# Patient Record
Sex: FEMALE | Race: WHITE | NOT HISPANIC OR LATINO | Employment: OTHER | URBAN - NONMETROPOLITAN AREA
[De-identification: names, ages, dates, MRNs, and addresses within clinical notes are randomized per-mention and may not be internally consistent; named-entity substitution may affect disease eponyms.]

---

## 2017-12-13 ENCOUNTER — APPOINTMENT (OUTPATIENT)
Dept: CT IMAGING | Facility: HOSPITAL | Age: 77
End: 2017-12-13

## 2017-12-13 ENCOUNTER — HOSPITAL ENCOUNTER (OUTPATIENT)
Facility: HOSPITAL | Age: 77
Setting detail: OBSERVATION
LOS: 2 days | Discharge: HOME HEALTH | End: 2017-12-19
Attending: EMERGENCY MEDICINE | Admitting: FAMILY MEDICINE

## 2017-12-13 DIAGNOSIS — S32.592A PUBIC RAMUS FRACTURE, LEFT, CLOSED, INITIAL ENCOUNTER (HCC): Primary | ICD-10-CM

## 2017-12-13 DIAGNOSIS — Z74.09 IMPAIRED MOBILITY AND ADLS: ICD-10-CM

## 2017-12-13 DIAGNOSIS — Z78.9 IMPAIRED MOBILITY AND ADLS: ICD-10-CM

## 2017-12-13 DIAGNOSIS — R26.89 IMPAIRED GAIT AND MOBILITY: ICD-10-CM

## 2017-12-13 DIAGNOSIS — S22.088A OTHER CLOSED FRACTURE OF TWELFTH THORACIC VERTEBRA, INITIAL ENCOUNTER (HCC): ICD-10-CM

## 2017-12-13 DIAGNOSIS — S32.020A CLOSED COMPRESSION FRACTURE OF SECOND LUMBAR VERTEBRA, INITIAL ENCOUNTER: ICD-10-CM

## 2017-12-13 DIAGNOSIS — Z74.09 IMPAIRED PHYSICAL MOBILITY: ICD-10-CM

## 2017-12-13 PROBLEM — M84.48XA: Status: ACTIVE | Noted: 2017-12-13

## 2017-12-13 LAB
GLUCOSE BLDC GLUCOMTR-MCNC: 184 MG/DL (ref 70–130)
GLUCOSE BLDC GLUCOMTR-MCNC: 224 MG/DL (ref 70–130)

## 2017-12-13 PROCEDURE — G0378 HOSPITAL OBSERVATION PER HR: HCPCS

## 2017-12-13 PROCEDURE — 82962 GLUCOSE BLOOD TEST: CPT

## 2017-12-13 PROCEDURE — 22310 CLOSED TX VERT FX W/O MANJ: CPT | Performed by: ORTHOPAEDIC SURGERY

## 2017-12-13 PROCEDURE — 74176 CT ABD & PELVIS W/O CONTRAST: CPT

## 2017-12-13 PROCEDURE — 71250 CT THORAX DX C-: CPT

## 2017-12-13 PROCEDURE — 96374 THER/PROPH/DIAG INJ IV PUSH: CPT

## 2017-12-13 PROCEDURE — 25010000002 MORPHINE PER 10 MG: Performed by: EMERGENCY MEDICINE

## 2017-12-13 PROCEDURE — 70450 CT HEAD/BRAIN W/O DYE: CPT

## 2017-12-13 PROCEDURE — 63710000001 INSULIN ASPART PER 5 UNITS: Performed by: NURSE PRACTITIONER

## 2017-12-13 PROCEDURE — 99232 SBSQ HOSP IP/OBS MODERATE 35: CPT | Performed by: ORTHOPAEDIC SURGERY

## 2017-12-13 PROCEDURE — 99285 EMERGENCY DEPT VISIT HI MDM: CPT

## 2017-12-13 RX ORDER — MORPHINE SULFATE 8 MG/ML
2 INJECTION INTRAMUSCULAR; INTRAVENOUS; SUBCUTANEOUS ONCE
Status: COMPLETED | OUTPATIENT
Start: 2017-12-13 | End: 2017-12-13

## 2017-12-13 RX ORDER — NALOXONE HCL 0.4 MG/ML
0.4 VIAL (ML) INJECTION
Status: DISCONTINUED | OUTPATIENT
Start: 2017-12-13 | End: 2017-12-19 | Stop reason: HOSPADM

## 2017-12-13 RX ORDER — GLIPIZIDE 5 MG/1
5 TABLET ORAL
COMMUNITY

## 2017-12-13 RX ORDER — ONDANSETRON 4 MG/1
4 TABLET, FILM COATED ORAL EVERY 6 HOURS PRN
Status: DISCONTINUED | OUTPATIENT
Start: 2017-12-13 | End: 2017-12-19 | Stop reason: HOSPADM

## 2017-12-13 RX ORDER — LISINOPRIL 2.5 MG/1
2.5 TABLET ORAL DAILY
COMMUNITY

## 2017-12-13 RX ORDER — ACETAMINOPHEN 325 MG/1
650 TABLET ORAL EVERY 4 HOURS PRN
Status: DISCONTINUED | OUTPATIENT
Start: 2017-12-13 | End: 2017-12-19 | Stop reason: HOSPADM

## 2017-12-13 RX ORDER — OMEPRAZOLE 20 MG/1
20 CAPSULE, DELAYED RELEASE ORAL DAILY
COMMUNITY

## 2017-12-13 RX ORDER — ASPIRIN 81 MG/1
81 TABLET, CHEWABLE ORAL DAILY
COMMUNITY

## 2017-12-13 RX ORDER — ATENOLOL 25 MG/1
25 TABLET ORAL DAILY
Status: DISCONTINUED | OUTPATIENT
Start: 2017-12-13 | End: 2017-12-13

## 2017-12-13 RX ORDER — ONDANSETRON 4 MG/1
4 TABLET, ORALLY DISINTEGRATING ORAL EVERY 6 HOURS PRN
Status: DISCONTINUED | OUTPATIENT
Start: 2017-12-13 | End: 2017-12-19 | Stop reason: HOSPADM

## 2017-12-13 RX ORDER — ATENOLOL 25 MG/1
25 TABLET ORAL DAILY
Status: ON HOLD | COMMUNITY
End: 2017-12-13 | Stop reason: DRUGHIGH

## 2017-12-13 RX ORDER — PHENOL 1.4 %
600 AEROSOL, SPRAY (ML) MUCOUS MEMBRANE DAILY
COMMUNITY
End: 2020-09-29

## 2017-12-13 RX ORDER — ATORVASTATIN CALCIUM 20 MG/1
20 TABLET, FILM COATED ORAL DAILY
Status: DISCONTINUED | OUTPATIENT
Start: 2017-12-13 | End: 2017-12-19 | Stop reason: HOSPADM

## 2017-12-13 RX ORDER — UBIDECARENONE 75 MG
50 CAPSULE ORAL DAILY
COMMUNITY
End: 2017-12-19 | Stop reason: HOSPADM

## 2017-12-13 RX ORDER — MORPHINE SULFATE 1 MG/ML
1 INJECTION, SOLUTION EPIDURAL; INTRATHECAL; INTRAVENOUS EVERY 4 HOURS PRN
Status: DISCONTINUED | OUTPATIENT
Start: 2017-12-13 | End: 2017-12-19 | Stop reason: HOSPADM

## 2017-12-13 RX ORDER — UBIDECARENONE 75 MG
50 CAPSULE ORAL DAILY
COMMUNITY

## 2017-12-13 RX ORDER — GLIPIZIDE 5 MG/1
5 TABLET ORAL
Status: DISCONTINUED | OUTPATIENT
Start: 2017-12-13 | End: 2017-12-19 | Stop reason: HOSPADM

## 2017-12-13 RX ORDER — FERROUS SULFATE TAB EC 324 MG (65 MG FE EQUIVALENT) 324 (65 FE) MG
324 TABLET DELAYED RESPONSE ORAL
COMMUNITY

## 2017-12-13 RX ORDER — PANTOPRAZOLE SODIUM 40 MG/1
40 TABLET, DELAYED RELEASE ORAL EVERY MORNING
Status: DISCONTINUED | OUTPATIENT
Start: 2017-12-14 | End: 2017-12-19 | Stop reason: HOSPADM

## 2017-12-13 RX ORDER — ONDANSETRON 2 MG/ML
4 INJECTION INTRAMUSCULAR; INTRAVENOUS EVERY 6 HOURS PRN
Status: DISCONTINUED | OUTPATIENT
Start: 2017-12-13 | End: 2017-12-19 | Stop reason: HOSPADM

## 2017-12-13 RX ORDER — ATENOLOL 25 MG/1
12.5 TABLET ORAL DAILY
Status: DISCONTINUED | OUTPATIENT
Start: 2017-12-14 | End: 2017-12-19 | Stop reason: HOSPADM

## 2017-12-13 RX ORDER — HYDROCODONE BITARTRATE AND ACETAMINOPHEN 5; 325 MG/1; MG/1
1 TABLET ORAL EVERY 4 HOURS PRN
Status: DISCONTINUED | OUTPATIENT
Start: 2017-12-13 | End: 2017-12-19 | Stop reason: HOSPADM

## 2017-12-13 RX ORDER — LISINOPRIL 2.5 MG/1
2.5 TABLET ORAL DAILY
Status: DISCONTINUED | OUTPATIENT
Start: 2017-12-13 | End: 2017-12-19 | Stop reason: HOSPADM

## 2017-12-13 RX ORDER — HYDROCHLOROTHIAZIDE 12.5 MG/1
12.5 TABLET ORAL DAILY
COMMUNITY

## 2017-12-13 RX ORDER — ASPIRIN 81 MG/1
81 TABLET, CHEWABLE ORAL DAILY
Status: DISCONTINUED | OUTPATIENT
Start: 2017-12-13 | End: 2017-12-19 | Stop reason: HOSPADM

## 2017-12-13 RX ORDER — ATENOLOL 25 MG/1
12.5 TABLET ORAL DAILY
COMMUNITY

## 2017-12-13 RX ORDER — HYDROCHLOROTHIAZIDE 12.5 MG/1
12.5 CAPSULE, GELATIN COATED ORAL DAILY
Status: DISCONTINUED | OUTPATIENT
Start: 2017-12-13 | End: 2017-12-19 | Stop reason: HOSPADM

## 2017-12-13 RX ORDER — HYDROCHLOROTHIAZIDE 25 MG/1
12.5 TABLET ORAL DAILY
Status: DISCONTINUED | OUTPATIENT
Start: 2017-12-13 | End: 2017-12-13 | Stop reason: CLARIF

## 2017-12-13 RX ORDER — FERROUS SULFATE 325(65) MG
325 TABLET ORAL
COMMUNITY
End: 2017-12-19 | Stop reason: HOSPADM

## 2017-12-13 RX ORDER — SIMVASTATIN 40 MG
40 TABLET ORAL NIGHTLY
COMMUNITY

## 2017-12-13 RX ADMIN — INSULIN ASPART 4 UNITS: 100 INJECTION, SOLUTION INTRAVENOUS; SUBCUTANEOUS at 17:30

## 2017-12-13 RX ADMIN — MORPHINE SULFATE 2 MG: 8 INJECTION, SOLUTION INTRAMUSCULAR; INTRAVENOUS at 09:56

## 2017-12-13 RX ADMIN — ATORVASTATIN CALCIUM 20 MG: 20 TABLET, FILM COATED ORAL at 16:28

## 2017-12-13 RX ADMIN — HYDROCODONE BITARTRATE AND ACETAMINOPHEN 1 TABLET: 5; 325 TABLET ORAL at 15:02

## 2017-12-13 RX ADMIN — HYDROCODONE BITARTRATE AND ACETAMINOPHEN 1 TABLET: 5; 325 TABLET ORAL at 20:58

## 2017-12-13 RX ADMIN — GLIPIZIDE 5 MG: 5 TABLET ORAL at 17:29

## 2017-12-13 RX ADMIN — ASPIRIN 81 MG 81 MG: 81 TABLET ORAL at 16:32

## 2017-12-13 RX ADMIN — METFORMIN HYDROCHLORIDE 1000 MG: 500 TABLET ORAL at 17:29

## 2017-12-13 RX ADMIN — LISINOPRIL 2.5 MG: 2.5 TABLET ORAL at 16:30

## 2017-12-13 RX ADMIN — HYDROCHLOROTHIAZIDE 12.5 MG: 12.5 CAPSULE ORAL at 16:28

## 2017-12-13 RX ADMIN — INSULIN ASPART 2 UNITS: 100 INJECTION, SOLUTION INTRAVENOUS; SUBCUTANEOUS at 20:58

## 2017-12-13 NOTE — CONSULTS
Chief Complaint   Patient presents with   • Fall     78 y/o fell at home, complains of pain in the back and hip.  Aching pain, sudden onset. This pain is constant.  The pain does not radiate, the pain is aching, constant. the pain is 6/10.  Fell at home, she fell on steps.  Review of system- see the primary dr examination/documentation  Past Medical History:   Diagnosis Date   • Cancer    • Diabetes mellitus    • Hypertension    • Irregular heart beat           Current Facility-Administered Medications:   •  acetaminophen (TYLENOL) tablet 650 mg, 650 mg, Oral, Q4H PRN, Sierra Salazar, APRN  •  aspirin chewable tablet 81 mg, 81 mg, Oral, Daily, Sierra Salazar, APRN, 81 mg at 12/13/17 1632  •  [START ON 12/14/2017] atenolol (TENORMIN) half tablet 12.5 mg, 12.5 mg, Oral, Daily, Sierra Salazar, APRN  •  atorvastatin (LIPITOR) tablet 20 mg, 20 mg, Oral, Daily, Sierra Salazar APRN, 20 mg at 12/13/17 1628  •  glipiZIDE (GLUCOTROL) tablet 5 mg, 5 mg, Oral, BID AC, Sierra Salazar, APRN, 5 mg at 12/13/17 1729  •  hydrochlorothiazide (MICROZIDE) capsule 12.5 mg, 12.5 mg, Oral, Daily, Perico Gardiner MD, 12.5 mg at 12/13/17 1628  •  HYDROcodone-acetaminophen (NORCO) 5-325 MG per tablet 1 tablet, 1 tablet, Oral, Q4H PRN, Sierra Salazar APRN, 1 tablet at 12/13/17 1502  •  insulin aspart (novoLOG) injection 0-9 Units, 0-9 Units, Subcutaneous, 4x Daily AC & at Bedtime, Sierra Salazar, APRN, 4 Units at 12/13/17 1730  •  lisinopril (PRINIVIL,ZESTRIL) tablet 2.5 mg, 2.5 mg, Oral, Daily, Sierra Salazar APRN, 2.5 mg at 12/13/17 1630  •  metFORMIN (GLUCOPHAGE) tablet 1,000 mg, 1,000 mg, Oral, BID With Meals, Sierra Salazar APRN, 1,000 mg at 12/13/17 1729  •  Morphine injection 1 mg, 1 mg, Intravenous, Q4H PRN **AND** naloxone (NARCAN) injection 0.4 mg, 0.4 mg, Intravenous, Q5 Min PRN, DEANNA Terrell  •  ondansetron (ZOFRAN) tablet 4 mg, 4 mg, Oral, Q6H PRN **OR** ondansetron ODT (ZOFRAN-ODT) disintegrating tablet 4 mg, 4 mg, Oral, Q6H PRN  "**OR** ondansetron (ZOFRAN) injection 4 mg, 4 mg, Intravenous, Q6H PRN, DEANNA Terrell  •  [START ON 12/14/2017] pantoprazole (PROTONIX) EC tablet 40 mg, 40 mg, Oral, QAM, Sierra Salazar, APRN   Past Surgical History:   Procedure Laterality Date   • ANKLE SURGERY     • CHOLECYSTECTOMY       Social History     Social History   • Marital status:      Spouse name: N/A   • Number of children: N/A   • Years of education: N/A     Occupational History   • Not on file.     Social History Main Topics   • Smoking status: Former Smoker   • Smokeless tobacco: Not on file   • Alcohol use No   • Drug use: No   • Sexual activity: Not on file     Other Topics Concern   • Not on file     Social History Narrative   • No narrative on file   History reviewed. No pertinent family history.   History reviewed. No pertinent family history.   Allergies   Allergen Reactions   • Niacin And Related    • Other      Allergic to \"all mycins\"     Vitals:    12/13/17 1628   BP: 138/74   Pulse: 107   Resp:    Temp:    SpO2:        Alert  + pain of the back region  + pain passive motion of the hip.  Pulse present, all extremities.  No neck tenderness      Lab Results (last 24 hours)     Procedure Component Value Units Date/Time    POC Glucose Once [[id number]]  (Abnormal) Collected:  12/13/17 1642    Specimen:  Blood Updated:  12/13/17 1657     Glucose 224 (H) mg/dL       RN NotifiedMeter: [id number]: [id number] ALIZA LAWSON           Imaging Results (last 24 hours)     Procedure Component Value Units Date/Time    CT Head Without Contrast [[id number]] Collected:  12/13/17 1137     Updated:  12/13/17 1150    Narrative:       .      EXAMINATION:  Computed Tomography      REGION:  Head             INDICATION:  injury    HISTORY:  CORRELATIVE IMAGING:    none    TECHNIQUE:  iv contrast:  no    This exam was performed according to the departmental  dose-optimization program which includes automated exposure  control, adjustment of the " mA and/or kV according to patient size  and/or use of iterative reconstruction technique.              COMMENTS:                - atrophy: wnl for age       - cortex:                wnl for age      - deep white mat: wnl for age       - hemorrhage:        none       - fluid collection:  no intra/extra axial fluid collection     - mass / lesion:     no focal parenchymal lesion(s)       - gray/white jxn:  borders preserved         - brain stem:        wnl       - cerebellum:       wnl       - globes / retro:    wnl       - ventricles:         normal size / configuration       - midline shift:     no       - sinuses:             wnl       - mastoids:           wnl        - osseous:             wnl       - misc.:    .       Impression:       CONCLUSION:  1.  Negative examination for acute intracranial pathology.          Electronically signed by:  YULIYA Soriano MD  12/13/2017 11:49  AM CST Workstation: [id number]    CT Abdomen Pelvis Without Contrast [[id number]] Collected:  12/13/17 1132     Updated:  12/13/17 1201    Narrative:         EXAMINATION:  Computed Tomography           REGION:    Chest / Abdomen / Pelvis                     INDICATION:     injury    HISTORY:  ZANDRA. IMAGING:    none            TECHNIQUE:      - reconstructions:    axial, coronal, sagittal         - contrast:      oral:  no ;   intravenous:  no     - Please note:      - Lack of IV contrast limits assessment of solid organ  parenchyma, urinary system, or vascular structures.      - Lack of oral contrast limits assessment of GI tract  structures or peritoneal disease.    This exam was performed according to the departmental  dose-optimization program which includes automated exposure  control, adjustment of the mA and/or kV according to patient size  and/or use of iterative reconstruction technique.          COMMENTS:           PULMONARY PARENCHYMA:         The air spaces are grossly unremarkable.    The pulmonary interstitium are grossly  within normal limits for  age.         There are no pulmonary nodules or mass.                 MEDIASTINUM / JUAN J:         The heart is of normal size and there is no pericardial fluid.     The aorta and great vessels are of normal caliber and  configuration for age.     No mediastinal mass or significant adenopathy.             PLEURAL COMPARTMENT:       There is no pleural fluid or air.                MISC:       The inferior neck is negative.     The osseous and body wall are negative.           ABDOMEN:    Limited assessment of the solid organ parenchyma is grossly  unremarkable demonstrating no evidence of organomegaly.  Status post cholecystectomy  Limited assessment of the viscera is grossly unremarkable  demonstrating normal caliber bowel loops. No evidence of free  fluid or free intraperitoneal air.  No jaime fracture. Vertebral body compression deformities of L2,  and to a lesser extent at T12. The T12 level may be associated  with a transverse fracture, nondisplaced.    Sclerotic centimeter size bone lesion involving L4, likely  benign.    RETROPERITONEUM:  Limited assessment of the kidneys demonstrates overall normal  size.  Limited assessment of the ureters demonstrates normal caliber and  course.  The adrenal glands are of normal size and contour.  No gross evidence of significant retroperitoneal adenopathy.  The vascular structures are grossly within normal limits for age.     PELVIS:   Limited assessment of the viscera is grossly unremarkable  demonstrating normal caliber bowel loops. No evidence of free  fluid or free intraperitoneal air.  There is a minimally displaced fracture of the inferior pubic  ramus on the left.  The vascular structures are grossly within normal limits for age.     .       Impression:        CONCLUSION:  1. Limited examination due to the lack of intravenous contrast.  2. No gross evidence of vascular or solid organ injury,  examination limited in that regard lacking  intravenous contrast,  which is recommended for trauma assessments.  3. There is a minimally displaced fracture involving the left  inferior pubic ramus.  4. Possible acute transverse fracture involving T12 which is  nondisplaced.  5. Vertebral body compression deformity of L2, time course  uncertain.      Electronically signed by:  YULIYA Soriano MD  12/13/2017 12:00  PM CST Workstation: [id number]    CT Chest Without Contrast [[id number]] Collected:  12/13/17 1132     Updated:  12/13/17 1201    Narrative:         EXAMINATION:  Computed Tomography           REGION:    Chest / Abdomen / Pelvis                     INDICATION:     injury    HISTORY:  ZANDRA. IMAGING:    none            TECHNIQUE:      - reconstructions:    axial, coronal, sagittal         - contrast:      oral:  no ;   intravenous:  no     - Please note:      - Lack of IV contrast limits assessment of solid organ  parenchyma, urinary system, or vascular structures.      - Lack of oral contrast limits assessment of GI tract  structures or peritoneal disease.    This exam was performed according to the departmental  dose-optimization program which includes automated exposure  control, adjustment of the mA and/or kV according to patient size  and/or use of iterative reconstruction technique.          COMMENTS:           PULMONARY PARENCHYMA:         The air spaces are grossly unremarkable.    The pulmonary interstitium are grossly within normal limits for  age.         There are no pulmonary nodules or mass.                 MEDIASTINUM / JUAN J:         The heart is of normal size and there is no pericardial fluid.     The aorta and great vessels are of normal caliber and  configuration for age.     No mediastinal mass or significant adenopathy.             PLEURAL COMPARTMENT:       There is no pleural fluid or air.                MISC:       The inferior neck is negative.     The osseous and body wall are negative.           ABDOMEN:    Limited assessment  of the solid organ parenchyma is grossly  unremarkable demonstrating no evidence of organomegaly.  Status post cholecystectomy  Limited assessment of the viscera is grossly unremarkable  demonstrating normal caliber bowel loops. No evidence of free  fluid or free intraperitoneal air.  No jaime fracture. Vertebral body compression deformities of L2,  and to a lesser extent at T12. The T12 level may be associated  with a transverse fracture, nondisplaced.    Sclerotic centimeter size bone lesion involving L4, likely  benign.    RETROPERITONEUM:  Limited assessment of the kidneys demonstrates overall normal  size.  Limited assessment of the ureters demonstrates normal caliber and  course.  The adrenal glands are of normal size and contour.  No gross evidence of significant retroperitoneal adenopathy.  The vascular structures are grossly within normal limits for age.     PELVIS:   Limited assessment of the viscera is grossly unremarkable  demonstrating normal caliber bowel loops. No evidence of free  fluid or free intraperitoneal air.  There is a minimally displaced fracture of the inferior pubic  ramus on the left.  The vascular structures are grossly within normal limits for age.     .       Impression:        CONCLUSION:  1. Limited examination due to the lack of intravenous contrast.  2. No gross evidence of vascular or solid organ injury,  examination limited in that regard lacking intravenous contrast,  which is recommended for trauma assessments.  3. There is a minimally displaced fracture involving the left  inferior pubic ramus.  4. Possible acute transverse fracture involving T12 which is  nondisplaced.  5. Vertebral body compression deformity of L2, time course  uncertain.      Electronically signed by:  YULIYA Soriano MD  12/13/2017 12:00  PM [facility] Workstation: [id number]        CT- fracture T12 and L2, and pelvic ring fracture.    Will get a spinal brace ordered for her to ambulate.  No surgical treatment,  the skeletal injuries are best to run the natural course of healing.

## 2017-12-13 NOTE — H&P
[facility] Medicine Admission      Date of Admission: 2017      Primary Care Physician: Troy Cheng MD      Chief Complaint: Fall    HPI: 77-year-old  female past medical history of type 2 diabetes, hypertension, hyperlipidemia, osteoporosis, skin cancer who presents after suffering a fall last night around 11 PM.  The patient reports that she was walking to the restroom at approximately 11 PM last night and did not turn the lights on.  She reports that she thought she was walking in to her restroom but instead walked through the doorway which was her basement steps.  The patient fell down approximately 11-12 steps.  She was brought into the emergency department and found to have left pubic ramus fracture, L2 vertebral body compression deformities, and possible acute transverse fracture of the T12 vertebrae.  The patient's biggest complaint is lower back pain post fall.      Concurrent Medical History: Type 2 diabetes, hypertension, hyperlipidemia, osteoporosis, skin cancer    Past Surgical History: Left ankle fracture repair, cholecystectomy    Family History: Family history is significant for coronary artery disease and type 2 diabetes in the patient's mother is .  Patient's father is  and also had history of coronary artery disease.  Patient reports having 2 siblings who are  related to lung cancer and 1 brother who is  related to colon cancer    Social History:  reports that she has quit smoking. She does not have any smokeless tobacco history on file. She reports that she does not drink alcohol or use illicit drugs.    Allergies:   Allergies   Allergen Reactions   • Niacin And Related        Medications:   Prior to Admission medications    Medication Sig Start Date End Date Taking? Authorizing Provider   aspirin 81 MG chewable tablet Chew 81 mg Daily.   Yes Historical Provider, MD   atenolol (TENORMIN) 25 MG  tablet Take 25 mg by mouth Daily.   Yes Historical Provider, MD   glipiZIDE (GLUCOTROL) 5 MG tablet Take 5 mg by mouth 2 (Two) Times a Day Before Meals.   Yes Historical Provider, MD   hydrochlorothiazide (HYDRODIURIL) 12.5 MG tablet Take 12.5 mg by mouth Daily.   Yes Historical Provider, MD   lisinopril (PRINIVIL,ZESTRIL) 2.5 MG tablet Take 2.5 mg by mouth Daily.   Yes Historical Provider, MD   metFORMIN (GLUCOPHAGE) 1000 MG tablet Take 1,000 mg by mouth 2 (Two) Times a Day With Meals.   Yes Historical Provider, MD   omeprazole (priLOSEC) 20 MG capsule Take 20 mg by mouth Daily.   Yes Historical Provider, MD   simvastatin (ZOCOR) 40 MG tablet Take 40 mg by mouth Every Night.   Yes Historical Provider, MD       Review of Systems:  Review of Systems   Constitutional: Negative for chills and fever.   Respiratory: Negative for chest tightness, shortness of breath and wheezing.    Cardiovascular: Negative for chest pain, palpitations and leg swelling.   Gastrointestinal: Negative for abdominal pain, constipation, diarrhea, nausea and vomiting.   Musculoskeletal: Positive for back pain and gait problem.   Skin: Negative for pallor.   Psychiatric/Behavioral: Negative for confusion.      Otherwise complete ROS is negative except as mentioned above.    Physical Exam:   Temp:  [98 °F (36.7 °C)] 98 °F (36.7 °C)  Heart Rate:  [] 101  Resp:  [16-18] 18  BP: (164-193)/(76-88) 164/76  Physical Exam   Constitutional: She is oriented to person, place, and time. She appears well-developed and well-nourished.   HENT:   Head: Normocephalic and atraumatic.   Eyes: Conjunctivae are normal.   Neck: Neck supple.   Cardiovascular: Normal rate, normal heart sounds and intact distal pulses.    Pulmonary/Chest: Effort normal and breath sounds normal.   Abdominal: Soft. Bowel sounds are normal. She exhibits no distension. There is no tenderness.   Musculoskeletal: She exhibits no edema.   Neurological: She is alert and oriented to  person, place, and time.   Skin: Skin is warm and dry.   Psychiatric: She has a normal mood and affect. Her behavior is normal.   Vitals reviewed.        Results Reviewed:  I have personally reviewed current lab, radiology, and data and agree with results.  Lab Results (last 24 hours)     ** No results found for the last 24 hours. **        Imaging Results (last 24 hours)     Procedure Component Value Units Date/Time    CT Head Without Contrast [[id number]] Collected:  12/13/17 1137     Updated:  12/13/17 1150    Narrative:       .      EXAMINATION:  Computed Tomography      REGION:  Head             INDICATION:  injury    HISTORY:  CORRELATIVE IMAGING:    none    TECHNIQUE:  iv contrast:  no    This exam was performed according to the departmental  dose-optimization program which includes automated exposure  control, adjustment of the mA and/or kV according to patient size  and/or use of iterative reconstruction technique.              COMMENTS:                - atrophy: wnl for age       - cortex:                wnl for age      - deep white mat: wnl for age       - hemorrhage:        none       - fluid collection:  no intra/extra axial fluid collection     - mass / lesion:     no focal parenchymal lesion(s)       - gray/white jxn:  borders preserved         - brain stem:        wnl       - cerebellum:       wnl       - globes / retro:    wnl       - ventricles:         normal size / configuration       - midline shift:     no       - sinuses:             wnl       - mastoids:           wnl        - osseous:             wnl       - misc.:    .       Impression:       CONCLUSION:  1.  Negative examination for acute intracranial pathology.          Electronically signed by:  YULIYA Soriano MD  12/13/2017 11:49  AM CST Workstation: [id number]    CT Abdomen Pelvis Without Contrast [[id number]] Collected:  12/13/17 1132     Updated:  12/13/17 1201    Narrative:         EXAMINATION:  Computed Tomography           REGION:     Chest / Abdomen / Pelvis                     INDICATION:     injury    HISTORY:  ZANDRA. IMAGING:    none            TECHNIQUE:      - reconstructions:    axial, coronal, sagittal         - contrast:      oral:  no ;   intravenous:  no     - Please note:      - Lack of IV contrast limits assessment of solid organ  parenchyma, urinary system, or vascular structures.      - Lack of oral contrast limits assessment of GI tract  structures or peritoneal disease.    This exam was performed according to the departmental  dose-optimization program which includes automated exposure  control, adjustment of the mA and/or kV according to patient size  and/or use of iterative reconstruction technique.          COMMENTS:           PULMONARY PARENCHYMA:         The air spaces are grossly unremarkable.    The pulmonary interstitium are grossly within normal limits for  age.         There are no pulmonary nodules or mass.                 MEDIASTINUM / JUAN J:         The heart is of normal size and there is no pericardial fluid.     The aorta and great vessels are of normal caliber and  configuration for age.     No mediastinal mass or significant adenopathy.             PLEURAL COMPARTMENT:       There is no pleural fluid or air.                MISC:       The inferior neck is negative.     The osseous and body wall are negative.           ABDOMEN:    Limited assessment of the solid organ parenchyma is grossly  unremarkable demonstrating no evidence of organomegaly.  Status post cholecystectomy  Limited assessment of the viscera is grossly unremarkable  demonstrating normal caliber bowel loops. No evidence of free  fluid or free intraperitoneal air.  No jaime fracture. Vertebral body compression deformities of L2,  and to a lesser extent at T12. The T12 level may be associated  with a transverse fracture, nondisplaced.    Sclerotic centimeter size bone lesion involving L4, likely  benign.    RETROPERITONEUM:  Limited assessment of  the kidneys demonstrates overall normal  size.  Limited assessment of the ureters demonstrates normal caliber and  course.  The adrenal glands are of normal size and contour.  No gross evidence of significant retroperitoneal adenopathy.  The vascular structures are grossly within normal limits for age.     PELVIS:   Limited assessment of the viscera is grossly unremarkable  demonstrating normal caliber bowel loops. No evidence of free  fluid or free intraperitoneal air.  There is a minimally displaced fracture of the inferior pubic  ramus on the left.  The vascular structures are grossly within normal limits for age.     .       Impression:        CONCLUSION:  1. Limited examination due to the lack of intravenous contrast.  2. No gross evidence of vascular or solid organ injury,  examination limited in that regard lacking intravenous contrast,  which is recommended for trauma assessments.  3. There is a minimally displaced fracture involving the left  inferior pubic ramus.  4. Possible acute transverse fracture involving T12 which is  nondisplaced.  5. Vertebral body compression deformity of L2, time course  uncertain.      Electronically signed by:  YULIYA Soriano MD  12/13/2017 12:00  PM CST Workstation: [id number]    CT Chest Without Contrast [[id number]] Collected:  12/13/17 1132     Updated:  12/13/17 1201    Narrative:         EXAMINATION:  Computed Tomography           REGION:    Chest / Abdomen / Pelvis                     INDICATION:     injury    HISTORY:  ZANDRA. IMAGING:    none            TECHNIQUE:      - reconstructions:    axial, coronal, sagittal         - contrast:      oral:  no ;   intravenous:  no     - Please note:      - Lack of IV contrast limits assessment of solid organ  parenchyma, urinary system, or vascular structures.      - Lack of oral contrast limits assessment of GI tract  structures or peritoneal disease.    This exam was performed according to the departmental  dose-optimization  program which includes automated exposure  control, adjustment of the mA and/or kV according to patient size  and/or use of iterative reconstruction technique.          COMMENTS:           PULMONARY PARENCHYMA:         The air spaces are grossly unremarkable.    The pulmonary interstitium are grossly within normal limits for  age.         There are no pulmonary nodules or mass.                 MEDIASTINUM / JUAN J:         The heart is of normal size and there is no pericardial fluid.     The aorta and great vessels are of normal caliber and  configuration for age.     No mediastinal mass or significant adenopathy.             PLEURAL COMPARTMENT:       There is no pleural fluid or air.                MISC:       The inferior neck is negative.     The osseous and body wall are negative.           ABDOMEN:    Limited assessment of the solid organ parenchyma is grossly  unremarkable demonstrating no evidence of organomegaly.  Status post cholecystectomy  Limited assessment of the viscera is grossly unremarkable  demonstrating normal caliber bowel loops. No evidence of free  fluid or free intraperitoneal air.  No jaime fracture. Vertebral body compression deformities of L2,  and to a lesser extent at T12. The T12 level may be associated  with a transverse fracture, nondisplaced.    Sclerotic centimeter size bone lesion involving L4, likely  benign.    RETROPERITONEUM:  Limited assessment of the kidneys demonstrates overall normal  size.  Limited assessment of the ureters demonstrates normal caliber and  course.  The adrenal glands are of normal size and contour.  No gross evidence of significant retroperitoneal adenopathy.  The vascular structures are grossly within normal limits for age.     PELVIS:   Limited assessment of the viscera is grossly unremarkable  demonstrating normal caliber bowel loops. No evidence of free  fluid or free intraperitoneal air.  There is a minimally displaced fracture of the inferior  pubic  ramus on the left.  The vascular structures are grossly within normal limits for age.     .       Impression:        CONCLUSION:  1. Limited examination due to the lack of intravenous contrast.  2. No gross evidence of vascular or solid organ injury,  examination limited in that regard lacking intravenous contrast,  which is recommended for trauma assessments.  3. There is a minimally displaced fracture involving the left  inferior pubic ramus.  4. Possible acute transverse fracture involving T12 which is  nondisplaced.  5. Vertebral body compression deformity of L2, time course  uncertain.      Electronically signed by:  YULIYA Soriano MD  12/13/2017 12:00  PM CST Workstation: [id number]            Assessment/Plan:  1. Left pubic ramus fracture, transverse T 12 fracture, L2 compression deformity s/p fall: Ortho consulted, Pain control.    2. HTN: Atenolol, HCTZ, Lisinopril  3. HLD: Simvastatin  4. Type 2 DM: Glipizide, Metformin, FSBS AC and HS with SSI, continue home meds.     Further orders and the patient will depend upon the hospital course.  Plan of care as been discussed with the patient and her daughter visited bedside.  CODE STATUS is been confirmed with the patient and she wishes to be full code.          This document has been electronically signed by DEANNA Terrell on December 13, 2017 2:09 PM

## 2017-12-13 NOTE — ED PROVIDER NOTES
Subjective   Patient is a 77 y.o. female presenting with fall.   History provided by:  Patient   used: No    Fall   Mechanism of injury: fall    Injury location:  Torso and leg  Torso injury location:  L flank  Leg injury location:  L hip  Incident location:  Home  Time since incident:  1 hour  Arrived directly from scene: yes    Fall:     Fall occurred:  Down stairs    Height of fall:  12 stairs    Impact surface:  Carpet    Point of impact:  Unable to specify    Entrapped after fall: no    Suspicion of alcohol use: no    Suspicion of drug use: no    Prior to arrival data:     Bystander interventions:  None    Patient ambulatory at scene: no      Blood loss:  None    Responsiveness at scene:  Alert    Orientation at scene:  Person, place, situation and time    Loss of consciousness: no      Amnesic to event: no      Airway interventions:  None    Breathing interventions:  None    IV access status:  None    IO access:  None    Fluids administered:  None    Cardiac interventions:  None    Medications administered:  None    Immobilization:  None  Associated symptoms: back pain    Associated symptoms: no abdominal pain, no blindness, no chest pain, no difficulty breathing, no headaches, no hearing loss, no loss of consciousness, no nausea, no neck pain, no seizures and no vomiting    Risk factors: diabetes    Risk factors: no AICD, no anticoagulation therapy, no asthma, no beta blocker therapy, no CABG, no CAD, no CHF, no COPD, no dialysis, no hemophilia, no kidney disease, no pacemaker, no past MI, not pregnant and no steroid use        Review of Systems   Constitutional: Negative.    HENT: Negative.  Negative for hearing loss.    Eyes: Negative.  Negative for blindness.   Respiratory: Negative.    Cardiovascular: Negative.  Negative for chest pain.   Gastrointestinal: Negative.  Negative for abdominal pain, nausea and vomiting.   Endocrine: Negative.    Genitourinary: Negative.    Musculoskeletal:  Positive for back pain. Negative for arthralgias, gait problem, joint swelling, myalgias, neck pain and neck stiffness.        Complaining of left hip pain. Left rib pain. And low back pain     Skin: Negative.    Allergic/Immunologic: Negative.    Neurological: Negative.  Negative for seizures, loss of consciousness and headaches.   Hematological: Negative.    Psychiatric/Behavioral: Negative.        Past Medical History:   Diagnosis Date   • Cancer    • Diabetes mellitus    • Hypertension    • Irregular heart beat        Allergies   Allergen Reactions   • Niacin And Related        Past Surgical History:   Procedure Laterality Date   • ANKLE SURGERY     • CHOLECYSTECTOMY         History reviewed. No pertinent family history.    Social History     Social History   • Marital status:      Spouse name: N/A   • Number of children: N/A   • Years of education: N/A     Social History Main Topics   • Smoking status: Former Smoker   • Smokeless tobacco: None   • Alcohol use No   • Drug use: No   • Sexual activity: Not Asked     Other Topics Concern   • None     Social History Narrative   • None           Objective   Physical Exam   Constitutional: She is oriented to person, place, and time. She appears well-developed and well-nourished. No distress.   HENT:   Head: Normocephalic and atraumatic.   Eyes: Conjunctivae and EOM are normal. Pupils are equal, round, and reactive to light. Right eye exhibits no discharge. Left eye exhibits no discharge. No scleral icterus.   Neck: Normal range of motion. Neck supple. No JVD present. No tracheal deviation present. No thyromegaly present.   Cardiovascular: Normal rate, regular rhythm, normal heart sounds and intact distal pulses.  Exam reveals no gallop and no friction rub.    No murmur heard.  Pulmonary/Chest: Effort normal and breath sounds normal. No stridor. No respiratory distress. She has no wheezes. She has no rales. She exhibits no tenderness.   Abdominal: Soft. Bowel  sounds are normal. She exhibits no distension and no mass. There is no tenderness. There is no rebound and no guarding. No hernia.   Musculoskeletal: She exhibits no edema or deformity.        Left hip: She exhibits decreased range of motion, tenderness and bony tenderness. She exhibits normal strength, no swelling, no crepitus, no deformity and no laceration.        Arms:       Legs:  Lymphadenopathy:     She has no cervical adenopathy.   Neurological: She is alert and oriented to person, place, and time. She has normal reflexes.   Skin: Skin is warm and dry. No rash noted. She is not diaphoretic. No erythema. No pallor.   Psychiatric: She has a normal mood and affect. Her behavior is normal. Judgment and thought content normal.   Nursing note and vitals reviewed.      Procedures         ED Course  ED Course      Ct Head Without Contrast    Result Date: 12/13/2017  Narrative: .    EXAMINATION:  Computed Tomography    REGION:  Head         INDICATION:  injury  HISTORY: CORRELATIVE IMAGING:    none  TECHNIQUE:  iv contrast:  no  This exam was performed according to the departmental dose-optimization program which includes automated exposure control, adjustment of the mA and/or kV according to patient size and/or use of iterative reconstruction technique.          COMMENTS:            - atrophy: wnl for age     - cortex:                wnl for age    - deep white mat: wnl for age     - hemorrhage:        none     - fluid collection:  no intra/extra axial fluid collection   - mass / lesion:     no focal parenchymal lesion(s)     - gray/white jxn:  borders preserved       - brain stem:        wnl     - cerebellum:       wnl     - globes / retro:    wnl     - ventricles:         normal size / configuration     - midline shift:     no     - sinuses:             wnl     - mastoids:           wnl      - osseous:             wnl     - misc.: .       Impression: CONCLUSION: 1.  Negative examination for acute intracranial  pathology.    Electronically signed by:  YULIYA Soriano MD  12/13/2017 11:49 AM CST Workstation: [id number]    Ct Chest Without Contrast, Ct Abdomen Pelvis Without Contrast    Result Date: 12/13/2017  Narrative: EXAMINATION:  Computed Tomography         REGION:    Chest / Abdomen / Pelvis                 INDICATION:     injury  HISTORY: ZANDRA. IMAGING:    none        TECHNIQUE:    - reconstructions:    axial, coronal, sagittal       - contrast:      oral:  no ;   intravenous:  no   - Please note:     - Lack of IV contrast limits assessment of solid organ parenchyma, urinary system, or vascular structures.     - Lack of oral contrast limits assessment of GI tract structures or peritoneal disease. This exam was performed according to the departmental dose-optimization program which includes automated exposure control, adjustment of the mA and/or kV according to patient size and/or use of iterative reconstruction technique.      COMMENTS:       PULMONARY PARENCHYMA:       The air spaces are grossly unremarkable.  The pulmonary interstitium are grossly within normal limits for age.       There are no pulmonary nodules or mass.             MEDIASTINUM / JUAN J:       The heart is of normal size and there is no pericardial fluid.   The aorta and great vessels are of normal caliber and configuration for age.   No mediastinal mass or significant adenopathy.         PLEURAL COMPARTMENT:     There is no pleural fluid or air.            MISC:     The inferior neck is negative.   The osseous and body wall are negative.       ABDOMEN:  Limited assessment of the solid organ parenchyma is grossly unremarkable demonstrating no evidence of organomegaly. Status post cholecystectomy Limited assessment of the viscera is grossly unremarkable demonstrating normal caliber bowel loops. No evidence of free fluid or free intraperitoneal air. No jaime fracture. Vertebral body compression deformities of L2, and to a lesser extent at T12. The T12  level may be associated with a transverse fracture, nondisplaced.  Sclerotic centimeter size bone lesion involving L4, likely benign. RETROPERITONEUM: Limited assessment of the kidneys demonstrates overall normal size. Limited assessment of the ureters demonstrates normal caliber and course. The adrenal glands are of normal size and contour. No gross evidence of significant retroperitoneal adenopathy. The vascular structures are grossly within normal limits for age.  PELVIS: Limited assessment of the viscera is grossly unremarkable demonstrating normal caliber bowel loops. No evidence of free fluid or free intraperitoneal air. There is a minimally displaced fracture of the inferior pubic ramus on the left. The vascular structures are grossly within normal limits for age.  .       Impression:  CONCLUSION: 1. Limited examination due to the lack of intravenous contrast. 2. No gross evidence of vascular or solid organ injury, examination limited in that regard lacking intravenous contrast, which is recommended for trauma assessments. 3. There is a minimally displaced fracture involving the left inferior pubic ramus. 4. Possible acute transverse fracture involving T12 which is nondisplaced. 5. Vertebral body compression deformity of L2, time course uncertain. Electronically signed by:  YULIYA Soriano MD  12/13/2017 12:00 PM [facility] Workstation: [id number]                MDM  Number of Diagnoses or Management Options      Final diagnoses:   Pubic ramus fracture, left, closed, initial encounter   Other closed fracture of twelfth thoracic vertebra, initial encounter   Closed compression fracture of second lumbar vertebra, initial encounter            MONTSE Wolf  12/13/17 [id number]

## 2017-12-13 NOTE — PLAN OF CARE
Problem: Patient Care Overview (Adult)  Goal: Plan of Care Review  Outcome: Ongoing (interventions implemented as appropriate)    12/13/17 4650   Coping/Psychosocial Response Interventions   Plan Of Care Reviewed With patient   Patient Care Overview   Progress no change   Outcome Evaluation   Outcome Summary/Follow up Plan pain controlled with P.O medication, spoke with Dr. Bauman about back brace       Goal: Adult Individualization and Mutuality  Outcome: Ongoing (interventions implemented as appropriate)  Goal: Discharge Needs Assessment  Outcome: Ongoing (interventions implemented as appropriate)    Problem: Orthopaedic Fracture (Adult)  Goal: Signs and Symptoms of Listed Potential Problems Will be Absent or Manageable (Orthopaedic Fracture)  Outcome: Ongoing (interventions implemented as appropriate)

## 2017-12-14 PROBLEM — I10 HTN (HYPERTENSION): Status: ACTIVE | Noted: 2017-12-14

## 2017-12-14 PROBLEM — S32.029A CLOSED FRACTURE OF SECOND LUMBAR VERTEBRA: Status: ACTIVE | Noted: 2017-12-14

## 2017-12-14 PROBLEM — S22.089A: Status: ACTIVE | Noted: 2017-12-14

## 2017-12-14 PROBLEM — E11.9 TYPE 2 DIABETES MELLITUS: Status: ACTIVE | Noted: 2017-12-14

## 2017-12-14 LAB
ANION GAP SERPL CALCULATED.3IONS-SCNC: 11 MMOL/L (ref 5–15)
BASOPHILS # BLD AUTO: 0.01 10*3/MM3 (ref 0–0.2)
BASOPHILS NFR BLD AUTO: 0.1 % (ref 0–2)
BUN BLD-MCNC: 17 MG/DL (ref 7–21)
BUN/CREAT SERPL: 28.3 (ref 7–25)
CALCIUM SPEC-SCNC: 9 MG/DL (ref 8.4–10.2)
CHLORIDE SERPL-SCNC: 97 MMOL/L (ref 95–110)
CO2 SERPL-SCNC: 24 MMOL/L (ref 22–31)
CREAT BLD-MCNC: 0.6 MG/DL (ref 0.5–1)
DEPRECATED RDW RBC AUTO: 40.4 FL (ref 36.4–46.3)
EOSINOPHIL # BLD AUTO: 0.09 10*3/MM3 (ref 0–0.7)
EOSINOPHIL NFR BLD AUTO: 1.1 % (ref 0–7)
ERYTHROCYTE [DISTWIDTH] IN BLOOD BY AUTOMATED COUNT: 13.4 % (ref 11.5–14.5)
GFR SERPL CREATININE-BSD FRML MDRD: 97 ML/MIN/1.73 (ref 60–90)
GLUCOSE BLD-MCNC: 181 MG/DL (ref 60–100)
GLUCOSE BLDC GLUCOMTR-MCNC: 161 MG/DL (ref 70–130)
GLUCOSE BLDC GLUCOMTR-MCNC: 165 MG/DL (ref 70–130)
GLUCOSE BLDC GLUCOMTR-MCNC: 184 MG/DL (ref 70–130)
GLUCOSE BLDC GLUCOMTR-MCNC: 193 MG/DL (ref 70–130)
HCT VFR BLD AUTO: 33.4 % (ref 35–45)
HGB BLD-MCNC: 11 G/DL (ref 12–15.5)
IMM GRANULOCYTES # BLD: 0.04 10*3/MM3 (ref 0–0.02)
IMM GRANULOCYTES NFR BLD: 0.5 % (ref 0–0.5)
LYMPHOCYTES # BLD AUTO: 1.63 10*3/MM3 (ref 0.6–4.2)
LYMPHOCYTES NFR BLD AUTO: 19.4 % (ref 10–50)
MCH RBC QN AUTO: 27 PG (ref 26.5–34)
MCHC RBC AUTO-ENTMCNC: 32.9 G/DL (ref 31.4–36)
MCV RBC AUTO: 82.1 FL (ref 80–98)
MONOCYTES # BLD AUTO: 0.8 10*3/MM3 (ref 0–0.9)
MONOCYTES NFR BLD AUTO: 9.5 % (ref 0–12)
NEUTROPHILS # BLD AUTO: 5.85 10*3/MM3 (ref 2–8.6)
NEUTROPHILS NFR BLD AUTO: 69.4 % (ref 37–80)
PLATELET # BLD AUTO: 164 10*3/MM3 (ref 150–450)
PMV BLD AUTO: 10.6 FL (ref 8–12)
POTASSIUM BLD-SCNC: 3.5 MMOL/L (ref 3.5–5.1)
RBC # BLD AUTO: 4.07 10*6/MM3 (ref 3.77–5.16)
SODIUM BLD-SCNC: 132 MMOL/L (ref 137–145)
WBC NRBC COR # BLD: 8.42 10*3/MM3 (ref 3.2–9.8)

## 2017-12-14 PROCEDURE — G0378 HOSPITAL OBSERVATION PER HR: HCPCS

## 2017-12-14 PROCEDURE — 85025 COMPLETE CBC W/AUTO DIFF WBC: CPT | Performed by: NURSE PRACTITIONER

## 2017-12-14 PROCEDURE — 80048 BASIC METABOLIC PNL TOTAL CA: CPT | Performed by: NURSE PRACTITIONER

## 2017-12-14 PROCEDURE — 82962 GLUCOSE BLOOD TEST: CPT

## 2017-12-14 PROCEDURE — 63710000001 INSULIN ASPART PER 5 UNITS: Performed by: NURSE PRACTITIONER

## 2017-12-14 RX ADMIN — INSULIN ASPART 2 UNITS: 100 INJECTION, SOLUTION INTRAVENOUS; SUBCUTANEOUS at 23:14

## 2017-12-14 RX ADMIN — LISINOPRIL 2.5 MG: 2.5 TABLET ORAL at 08:40

## 2017-12-14 RX ADMIN — ONDANSETRON 4 MG: 4 TABLET, ORALLY DISINTEGRATING ORAL at 17:40

## 2017-12-14 RX ADMIN — GLIPIZIDE 5 MG: 5 TABLET ORAL at 07:38

## 2017-12-14 RX ADMIN — Medication 12.5 MG: at 08:39

## 2017-12-14 RX ADMIN — INSULIN ASPART 2 UNITS: 100 INJECTION, SOLUTION INTRAVENOUS; SUBCUTANEOUS at 11:10

## 2017-12-14 RX ADMIN — INSULIN ASPART 2 UNITS: 100 INJECTION, SOLUTION INTRAVENOUS; SUBCUTANEOUS at 07:38

## 2017-12-14 RX ADMIN — METFORMIN HYDROCHLORIDE 1000 MG: 500 TABLET ORAL at 07:37

## 2017-12-14 RX ADMIN — PANTOPRAZOLE SODIUM 40 MG: 40 TABLET, DELAYED RELEASE ORAL at 06:09

## 2017-12-14 RX ADMIN — HYDROCHLOROTHIAZIDE 12.5 MG: 12.5 CAPSULE ORAL at 08:39

## 2017-12-14 RX ADMIN — ASPIRIN 81 MG 81 MG: 81 TABLET ORAL at 08:39

## 2017-12-14 RX ADMIN — GLIPIZIDE 5 MG: 5 TABLET ORAL at 17:36

## 2017-12-14 RX ADMIN — METFORMIN HYDROCHLORIDE 1000 MG: 500 TABLET ORAL at 17:36

## 2017-12-14 RX ADMIN — HYDROCODONE BITARTRATE AND ACETAMINOPHEN 1 TABLET: 5; 325 TABLET ORAL at 16:44

## 2017-12-14 RX ADMIN — INSULIN ASPART 2 UNITS: 100 INJECTION, SOLUTION INTRAVENOUS; SUBCUTANEOUS at 17:36

## 2017-12-14 RX ADMIN — HYDROCODONE BITARTRATE AND ACETAMINOPHEN 1 TABLET: 5; 325 TABLET ORAL at 23:15

## 2017-12-14 RX ADMIN — ATORVASTATIN CALCIUM 20 MG: 20 TABLET, FILM COATED ORAL at 08:39

## 2017-12-14 RX ADMIN — HYDROCODONE BITARTRATE AND ACETAMINOPHEN 1 TABLET: 5; 325 TABLET ORAL at 08:45

## 2017-12-14 NOTE — PROGRESS NOTES
[facility] Medicine Services  INPATIENT PROGRESS NOTE    Length of Stay: 1  Date of Admission: 12/13/2017  Primary Care Physician: Troy Cheng MD    Subjective   Chief Complaint: Fall, back pain  HPI:  77 year old female with a history of DM II, HTN, and osteoporosis who suffered a fall and sustained fractures to her left pubic ramus, L2, and T12.  She was evaluated by spine/orthopedics who recommend brace and pain control, no surgical management.     Review of Systems   Constitutional: Negative for chills and fever.   Respiratory: Negative for shortness of breath.    Cardiovascular: Negative for chest pain and palpitations.   Gastrointestinal: Negative for abdominal pain, diarrhea, nausea and vomiting.   Musculoskeletal: Positive for back pain.      All pertinent negatives and positives are as above. All other systems have been reviewed and are negative unless otherwise stated.     Objective    Temp:  [96.6 °F (35.9 °C)-99.7 °F (37.6 °C)] 99.7 °F (37.6 °C)  Heart Rate:  [] 92  Resp:  [18] 18  BP: (103-142)/(52-77) 142/77    Physical Exam   Constitutional: She is oriented to person, place, and time. She appears well-developed and well-nourished.   HENT:   Head: Normocephalic and atraumatic.   Cardiovascular: Normal rate, regular rhythm, normal heart sounds and intact distal pulses.    Pulmonary/Chest: Effort normal and breath sounds normal. No respiratory distress.   Abdominal: Soft. Bowel sounds are normal. She exhibits no distension. There is no tenderness.   Musculoskeletal: Normal range of motion. She exhibits no edema or deformity.   Neurological: She is alert and oriented to person, place, and time. She exhibits normal muscle tone.   Skin: Skin is warm and dry. No erythema.   Vitals reviewed.    Results Review:  I have reviewed the labs, radiology results, and diagnostic studies.    Laboratory Data:     Results from last 7 days  Lab Units  12/14/17  0546   SODIUM mmol/L 132*   POTASSIUM mmol/L 3.5   CHLORIDE mmol/L 97   CO2 mmol/L 24.0   BUN mg/dL 17   CREATININE mg/dL 0.60   GLUCOSE mg/dL 181*   CALCIUM mg/dL 9.0   ANION GAP mmol/L 11.0     Estimated Creatinine Clearance: 56 mL/min (by C-G formula based on Cr of 0.6).            Results from last 7 days  Lab Units 12/14/17  0546   WBC 10*3/mm3 8.42   HEMOGLOBIN g/dL 11.0*   HEMATOCRIT % 33.4*   PLATELETS 10*3/mm3 164           Culture Data:   No results found for: BLOODCX  No results found for: URINECX  No results found for: RESPCX  No results found for: WOUNDCX  No results found for: STOOLCX  No components found for: BODYFLD    Radiology Data:   Imaging Results (last 24 hours)     ** No results found for the last 24 hours. **          I have reviewed the patient current medications.     Assessment/Plan     Hospital Problem List     * (Principal)Closed T12 spinal fracture    Closed fracture of ramus of left pubis    Closed fracture of second lumbar vertebra    HTN (hypertension)    Type 2 diabetes mellitus          Plan:    Back brace ordered per orthopedics  Pain control: PRN norco, PRN morphine  PT/OT when brace obtained  BP control with atenolol, lisinopril, and HCTZ  Glucose control with metformin/glipizide/SSI        This document has been electronically signed by DEANNA Cordova on December 14, 2017 1:51 PM

## 2017-12-14 NOTE — PLAN OF CARE
Problem: Patient Care Overview (Adult)  Goal: Plan of Care Review  Outcome: Ongoing (interventions implemented as appropriate)    12/14/17 6933   Coping/Psychosocial Response Interventions   Plan Of Care Reviewed With patient   Patient Care Overview   Progress no change   Outcome Evaluation   Outcome Summary/Follow up Plan pt vss, pain controlled with PO medications, back brace ordered and should be delivered 12/15, continuing to monitor       Goal: Adult Individualization and Mutuality  Outcome: Ongoing (interventions implemented as appropriate)  Goal: Discharge Needs Assessment  Outcome: Ongoing (interventions implemented as appropriate)    Problem: Orthopaedic Fracture (Adult)  Goal: Signs and Symptoms of Listed Potential Problems Will be Absent or Manageable (Orthopaedic Fracture)  Outcome: Ongoing (interventions implemented as appropriate)    Problem: Fall Risk (Adult)  Goal: Identify Related Risk Factors and Signs and Symptoms  Outcome: Outcome(s) achieved Date Met:  12/14/17  Goal: Absence of Falls  Outcome: Ongoing (interventions implemented as appropriate)       Detail Level: Detailed Quality 226: Preventive Care And Screening: Tobacco Use: Screening And Cessation Intervention: Patient screened for tobacco and never smoked Quality 431: Preventive Care And Screening: Unhealthy Alcohol Use - Screening: Patient screened for unhealthy alcohol use using a single question and scores less than 2 times per year

## 2017-12-14 NOTE — PLAN OF CARE
Problem: Patient Care Overview (Adult)  Goal: Plan of Care Review  Outcome: Ongoing (interventions implemented as appropriate)    12/14/17 0312   Coping/Psychosocial Response Interventions   Plan Of Care Reviewed With patient   Patient Care Overview   Progress no change   Outcome Evaluation   Outcome Summary/Follow up Plan Patient VSS, pain controlled with PO pain meds, has rested okay this shift       Goal: Adult Individualization and Mutuality  Outcome: Ongoing (interventions implemented as appropriate)  Goal: Discharge Needs Assessment  Outcome: Ongoing (interventions implemented as appropriate)    Problem: Orthopaedic Fracture (Adult)  Goal: Signs and Symptoms of Listed Potential Problems Will be Absent or Manageable (Orthopaedic Fracture)  Outcome: Ongoing (interventions implemented as appropriate)

## 2017-12-14 NOTE — PAYOR COMM NOTE
"Keagan Carr (77 y.o. Female)     Date of Birth Social Security Number Address Home Phone MRN    [id number]  [id number] [facility] [address] [phone] [id number]    [place] Marital Status          Orthodoxy        Admission Date Admission Type Admitting Provider Attending Provider Department, Room/Bed    12/13/17 Emergency Perico Gardiner MD Gibson, Bryce, MD [facility] 3 [facility], 355/1    Discharge Date Discharge Disposition Discharge Destination                      Attending Provider: Perico Gardiner MD     Allergies:  Niacin And Related, Other    Isolation:  None   Infection:  None   Code Status:  FULL    Ht:  162.6 cm (64\")   Wt:  70.9 kg (156 lb 3.2 oz)    Admission Cmt:  None   Principal Problem:  Pathological fracture of vertebra, initial encounter [M84.48XA]                 Active Insurance as of 12/13/2017     Primary Coverage     Payor Plan Insurance Group Employer/Plan Group    HUMANA MEDICARE REPLACEMENT HUMANA MEDICARE REPL [id number]     Payor Plan Address Payor Plan Phone Number Effective From Effective To    [address] [address] [phone] 1/1/2012     [place] [address]       Subscriber Name Subscriber Birth Date Member ID       KEAGAN CARR 1940 [id number]                 Emergency Contacts      (Rel.) Home Phone Work Phone Mobile Phone    Abdulaziz Carr (Spouse) [phone] -- [phone]    April Grady (Daughter) [phone] -- [phone]        Kathie Grimm RN [facility]  [phone] phone  [phone] fax    Ref#[id number]       History & Physical      DEANNA Terrell at 12/13/2017  2:09 PM     Attestation signed by Perico Gardiner MD at 12/13/2017 10:16 PM        I personally evaluated and examined the patient in conjunction with DEANNA Terrell and agree with the assessment, treatment plan, and disposition of the patient as recorded.    Exam:  Chest: CTAB          This document has been " electronically signed by Perico Gardiner MD on 2017 10:16 PM                                           [facility] Medicine Admission      Date of Admission: 2017      Primary Care Physician: Troy Cheng MD      Chief Complaint: Fall    HPI: 77-year-old  female past medical history of type 2 diabetes, hypertension, hyperlipidemia, osteoporosis, skin cancer who presents after suffering a fall last night around 11 PM.  The patient reports that she was walking to the restroom at approximately 11 PM last night and did not turn the lights on.  She reports that she thought she was walking in to her restroom but instead walked through the doorway which was her basement steps.  The patient fell down approximately 11-12 steps.  She was brought into the emergency department and found to have left pubic ramus fracture, L2 vertebral body compression deformities, and possible acute transverse fracture of the T12 vertebrae.  The patient's biggest complaint is lower back pain post fall.      Concurrent Medical History: Type 2 diabetes, hypertension, hyperlipidemia, osteoporosis, skin cancer    Past Surgical History: Left ankle fracture repair, cholecystectomy    Family History: Family history is significant for coronary artery disease and type 2 diabetes in the patient's mother is .  Patient's father is  and also had history of coronary artery disease.  Patient reports having 2 siblings who are  related to lung cancer and 1 brother who is  related to colon cancer    Social History:  reports that she has quit smoking. She does not have any smokeless tobacco history on file. She reports that she does not drink alcohol or use illicit drugs.    Allergies:   Allergies   Allergen Reactions   • Niacin And Related        Medications:   Prior to Admission medications    Medication Sig Start Date End Date Taking? Authorizing Provider    aspirin 81 MG chewable tablet Chew 81 mg Daily.   Yes Historical Provider, MD   atenolol (TENORMIN) 25 MG tablet Take 25 mg by mouth Daily.   Yes Historical Provider, MD   glipiZIDE (GLUCOTROL) 5 MG tablet Take 5 mg by mouth 2 (Two) Times a Day Before Meals.   Yes Historical Provider, MD   hydrochlorothiazide (HYDRODIURIL) 12.5 MG tablet Take 12.5 mg by mouth Daily.   Yes Historical Provider, MD   lisinopril (PRINIVIL,ZESTRIL) 2.5 MG tablet Take 2.5 mg by mouth Daily.   Yes Historical Provider, MD   metFORMIN (GLUCOPHAGE) 1000 MG tablet Take 1,000 mg by mouth 2 (Two) Times a Day With Meals.   Yes Historical Provider, MD   omeprazole (priLOSEC) 20 MG capsule Take 20 mg by mouth Daily.   Yes Historical Provider, MD   simvastatin (ZOCOR) 40 MG tablet Take 40 mg by mouth Every Night.   Yes Historical Provider, MD       Review of Systems:  Review of Systems   Constitutional: Negative for chills and fever.   Respiratory: Negative for chest tightness, shortness of breath and wheezing.    Cardiovascular: Negative for chest pain, palpitations and leg swelling.   Gastrointestinal: Negative for abdominal pain, constipation, diarrhea, nausea and vomiting.   Musculoskeletal: Positive for back pain and gait problem.   Skin: Negative for pallor.   Psychiatric/Behavioral: Negative for confusion.      Otherwise complete ROS is negative except as mentioned above.    Physical Exam:   Temp:  [98 °F (36.7 °C)] 98 °F (36.7 °C)  Heart Rate:  [] 101  Resp:  [16-18] 18  BP: (164-193)/(76-88) 164/76  Physical Exam   Constitutional: She is oriented to person, place, and time. She appears well-developed and well-nourished.   HENT:   Head: Normocephalic and atraumatic.   Eyes: Conjunctivae are normal.   Neck: Neck supple.   Cardiovascular: Normal rate, normal heart sounds and intact distal pulses.    Pulmonary/Chest: Effort normal and breath sounds normal.   Abdominal: Soft. Bowel sounds are normal. She exhibits no distension. There is  no tenderness.   Musculoskeletal: She exhibits no edema.   Neurological: She is alert and oriented to person, place, and time.   Skin: Skin is warm and dry.   Psychiatric: She has a normal mood and affect. Her behavior is normal.   Vitals reviewed.        Results Reviewed:  I have personally reviewed current lab, radiology, and data and agree with results.  Lab Results (last 24 hours)     ** No results found for the last 24 hours. **        Imaging Results (last 24 hours)     Procedure Component Value Units Date/Time    CT Head Without Contrast [[id number]] Collected:  12/13/17 1137     Updated:  12/13/17 1150    Narrative:       .      EXAMINATION:  Computed Tomography      REGION:  Head             INDICATION:  injury    HISTORY:  CORRELATIVE IMAGING:    none    TECHNIQUE:  iv contrast:  no    This exam was performed according to the departmental  dose-optimization program which includes automated exposure  control, adjustment of the mA and/or kV according to patient size  and/or use of iterative reconstruction technique.              COMMENTS:                - atrophy: wnl for age       - cortex:                wnl for age      - deep white mat: wnl for age       - hemorrhage:        none       - fluid collection:  no intra/extra axial fluid collection     - mass / lesion:     no focal parenchymal lesion(s)       - gray/white jxn:  borders preserved         - brain stem:        wnl       - cerebellum:       wnl       - globes / retro:    wnl       - ventricles:         normal size / configuration       - midline shift:     no       - sinuses:             wnl       - mastoids:           wnl        - osseous:             wnl       - misc.:    .       Impression:       CONCLUSION:  1.  Negative examination for acute intracranial pathology.          Electronically signed by:  YULIYA Soriano MD  12/13/2017 11:49  AM CST Workstation: [id number]    CT Abdomen Pelvis Without Contrast [[id number]] Collected:  12/13/17 1132      Updated:  12/13/17 1201    Narrative:         EXAMINATION:  Computed Tomography           REGION:    Chest / Abdomen / Pelvis                     INDICATION:     injury    HISTORY:  ZANDRA. IMAGING:    none            TECHNIQUE:      - reconstructions:    axial, coronal, sagittal         - contrast:      oral:  no ;   intravenous:  no     - Please note:      - Lack of IV contrast limits assessment of solid organ  parenchyma, urinary system, or vascular structures.      - Lack of oral contrast limits assessment of GI tract  structures or peritoneal disease.    This exam was performed according to the departmental  dose-optimization program which includes automated exposure  control, adjustment of the mA and/or kV according to patient size  and/or use of iterative reconstruction technique.          COMMENTS:           PULMONARY PARENCHYMA:         The air spaces are grossly unremarkable.    The pulmonary interstitium are grossly within normal limits for  age.         There are no pulmonary nodules or mass.                 MEDIASTINUM / JUAN J:         The heart is of normal size and there is no pericardial fluid.     The aorta and great vessels are of normal caliber and  configuration for age.     No mediastinal mass or significant adenopathy.             PLEURAL COMPARTMENT:       There is no pleural fluid or air.                MISC:       The inferior neck is negative.     The osseous and body wall are negative.           ABDOMEN:    Limited assessment of the solid organ parenchyma is grossly  unremarkable demonstrating no evidence of organomegaly.  Status post cholecystectomy  Limited assessment of the viscera is grossly unremarkable  demonstrating normal caliber bowel loops. No evidence of free  fluid or free intraperitoneal air.  No jaime fracture. Vertebral body compression deformities of L2,  and to a lesser extent at T12. The T12 level may be associated  with a transverse fracture, nondisplaced.    Sclerotic  centimeter size bone lesion involving L4, likely  benign.    RETROPERITONEUM:  Limited assessment of the kidneys demonstrates overall normal  size.  Limited assessment of the ureters demonstrates normal caliber and  course.  The adrenal glands are of normal size and contour.  No gross evidence of significant retroperitoneal adenopathy.  The vascular structures are grossly within normal limits for age.     PELVIS:   Limited assessment of the viscera is grossly unremarkable  demonstrating normal caliber bowel loops. No evidence of free  fluid or free intraperitoneal air.  There is a minimally displaced fracture of the inferior pubic  ramus on the left.  The vascular structures are grossly within normal limits for age.     .       Impression:        CONCLUSION:  1. Limited examination due to the lack of intravenous contrast.  2. No gross evidence of vascular or solid organ injury,  examination limited in that regard lacking intravenous contrast,  which is recommended for trauma assessments.  3. There is a minimally displaced fracture involving the left  inferior pubic ramus.  4. Possible acute transverse fracture involving T12 which is  nondisplaced.  5. Vertebral body compression deformity of L2, time course  uncertain.      Electronically signed by:  YULIYA Soriano MD  12/13/2017 12:00  PM [facility] Workstation: [id number]    CT Chest Without Contrast [[id number]] Collected:  12/13/17 1132     Updated:  12/13/17 1201    Narrative:         EXAMINATION:  Computed Tomography           REGION:    Chest / Abdomen / Pelvis                     INDICATION:     injury    HISTORY:  ZANDRA. IMAGING:    none            TECHNIQUE:      - reconstructions:    axial, coronal, sagittal         - contrast:      oral:  no ;   intravenous:  no     - Please note:      - Lack of IV contrast limits assessment of solid organ  parenchyma, urinary system, or vascular structures.      - Lack of oral contrast limits assessment of GI tract  structures  or peritoneal disease.    This exam was performed according to the departmental  dose-optimization program which includes automated exposure  control, adjustment of the mA and/or kV according to patient size  and/or use of iterative reconstruction technique.          COMMENTS:           PULMONARY PARENCHYMA:         The air spaces are grossly unremarkable.    The pulmonary interstitium are grossly within normal limits for  age.         There are no pulmonary nodules or mass.                 MEDIASTINUM / JUAN J:         The heart is of normal size and there is no pericardial fluid.     The aorta and great vessels are of normal caliber and  configuration for age.     No mediastinal mass or significant adenopathy.             PLEURAL COMPARTMENT:       There is no pleural fluid or air.                MISC:       The inferior neck is negative.     The osseous and body wall are negative.           ABDOMEN:    Limited assessment of the solid organ parenchyma is grossly  unremarkable demonstrating no evidence of organomegaly.  Status post cholecystectomy  Limited assessment of the viscera is grossly unremarkable  demonstrating normal caliber bowel loops. No evidence of free  fluid or free intraperitoneal air.  No jaime fracture. Vertebral body compression deformities of L2,  and to a lesser extent at T12. The T12 level may be associated  with a transverse fracture, nondisplaced.    Sclerotic centimeter size bone lesion involving L4, likely  benign.    RETROPERITONEUM:  Limited assessment of the kidneys demonstrates overall normal  size.  Limited assessment of the ureters demonstrates normal caliber and  course.  The adrenal glands are of normal size and contour.  No gross evidence of significant retroperitoneal adenopathy.  The vascular structures are grossly within normal limits for age.     PELVIS:   Limited assessment of the viscera is grossly unremarkable  demonstrating normal caliber bowel loops. No evidence of  free  fluid or free intraperitoneal air.  There is a minimally displaced fracture of the inferior pubic  ramus on the left.  The vascular structures are grossly within normal limits for age.     .       Impression:        CONCLUSION:  1. Limited examination due to the lack of intravenous contrast.  2. No gross evidence of vascular or solid organ injury,  examination limited in that regard lacking intravenous contrast,  which is recommended for trauma assessments.  3. There is a minimally displaced fracture involving the left  inferior pubic ramus.  4. Possible acute transverse fracture involving T12 which is  nondisplaced.  5. Vertebral body compression deformity of L2, time course  uncertain.      Electronically signed by:  YULIYA Soriano MD  12/13/2017 12:00  PM CST Workstation: [id number]            Assessment/Plan:  1. Left pubic ramus fracture, transverse T 12 fracture, L2 compression deformity s/p fall: Ortho consulted, Pain control.    2. HTN: Atenolol, HCTZ, Lisinopril  3. HLD: Simvastatin  4. Type 2 DM: Glipizide, Metformin, FSBS AC and HS with SSI, continue home meds.     Further orders and the patient will depend upon the hospital course.  Plan of care as been discussed with the patient and her daughter visited bedside.  CODE STATUS is been confirmed with the patient and she wishes to be full code.          This document has been electronically signed by DEANNA Terrell on December 13, 2017 2:09 PM                     Electronically signed by Perico Gardiner MD at 12/13/2017 10:16 PM        Hospital Medications (active)       Dose Frequency Start End    acetaminophen (TYLENOL) tablet 650 mg 650 mg Every 4 Hours PRN 12/13/2017     Sig - Route: Take 2 tablets by mouth Every 4 (Four) Hours As Needed for Mild Pain . - Oral    aspirin chewable tablet 81 mg 81 mg Daily 12/13/2017     Sig - Route: Chew 1 tablet Daily. - Oral    atenolol (TENORMIN) half tablet 12.5 mg 12.5 mg Daily 12/14/2017     Sig - Route:  "Take 1 half tablet by mouth Daily. - Oral    atorvastatin (LIPITOR) tablet 20 mg 20 mg Daily 12/13/2017     Sig - Route: Take 1 tablet by mouth Daily. - Oral    glipiZIDE (GLUCOTROL) tablet 5 mg 5 mg 2 Times Daily Before Meals 12/13/2017     Sig - Route: Take 1 tablet by mouth 2 (Two) Times a Day Before Meals. - Oral    hydrochlorothiazide (MICROZIDE) capsule 12.5 mg 12.5 mg Daily 12/13/2017     Sig - Route: Take 1 capsule by mouth Daily. - Oral    HYDROcodone-acetaminophen (NORCO) 5-325 MG per tablet 1 tablet 1 tablet Every 4 Hours PRN 12/13/2017 12/23/2017    Sig - Route: Take 1 tablet by mouth Every 4 (Four) Hours As Needed for Moderate Pain . - Oral    insulin aspart (novoLOG) injection 0-9 Units 0-9 Units 4 Times Daily Before Meals & Nightly 12/13/2017     Sig - Route: Inject 0-9 Units under the skin 4 (Four) Times a Day Before Meals & at Bedtime. - Subcutaneous    lisinopril (PRINIVIL,ZESTRIL) tablet 2.5 mg 2.5 mg Daily 12/13/2017     Sig - Route: Take 1 tablet by mouth Daily. - Oral    metFORMIN (GLUCOPHAGE) tablet 1,000 mg 1,000 mg 2 Times Daily With Meals 12/13/2017     Sig - Route: Take 2 tablets by mouth 2 (Two) Times a Day With Meals. - Oral    Morphine injection 1 mg 1 mg Every 4 Hours PRN 12/13/2017 12/23/2017    Sig - Route: Infuse 1 mL into a venous catheter Every 4 (Four) Hours As Needed for Severe Pain . - Intravenous    Linked Group 1:  \"And\" Linked Group Details        naloxone (NARCAN) injection 0.4 mg 0.4 mg Every 5 Minutes PRN 12/13/2017     Sig - Route: Infuse 1 mL into a venous catheter Every 5 (Five) Minutes As Needed for Respiratory Depression. - Intravenous    Linked Group 1:  \"And\" Linked Group Details        ondansetron (ZOFRAN) injection 4 mg 4 mg Every 6 Hours PRN 12/13/2017     Sig - Route: Infuse 2 mL into a venous catheter Every 6 (Six) Hours As Needed for Nausea or Vomiting. - Intravenous    Linked Group 2:  \"Or\" Linked Group Details        ondansetron (ZOFRAN) tablet 4 mg 4 mg " "Every 6 Hours PRN 12/13/2017     Sig - Route: Take 1 tablet by mouth Every 6 (Six) Hours As Needed for Nausea or Vomiting. - Oral    Linked Group 2:  \"Or\" Linked Group Details        ondansetron ODT (ZOFRAN-ODT) disintegrating tablet 4 mg 4 mg Every 6 Hours PRN 12/13/2017     Sig - Route: Take 1 tablet by mouth Every 6 (Six) Hours As Needed for Nausea or Vomiting. - Oral    Linked Group 2:  \"Or\" Linked Group Details        pantoprazole (PROTONIX) EC tablet 40 mg 40 mg Every Morning 12/14/2017     Sig - Route: Take 1 tablet by mouth Every Morning. - Oral    atenolol (TENORMIN) tablet 25 mg (Discontinued) 25 mg Daily 12/13/2017 12/13/2017    Sig - Route: Take 1 tablet by mouth Daily. - Oral    hydrochlorothiazide (HYDRODIURIL) tablet 12.5 mg (Discontinued) 12.5 mg Daily 12/13/2017 12/13/2017    Sig - Route: Take 0.5 tablets by mouth Daily. - Oral    Reason for Discontinue: Formulary change          Lab Results (all)     Procedure Component Value Units Date/Time    POC Glucose Once [[id number]]  (Abnormal) Collected:  12/13/17 1642    Specimen:  Blood Updated:  12/13/17 1657     Glucose 224 (H) mg/dL       RN NotifiedMeter: [id number]: [id number] ALIZA LAWSON       POC Glucose Once [[id number]]  (Abnormal) Collected:  12/13/17 2026    Specimen:  Blood Updated:  12/13/17 2055     Glucose 184 (H) mg/dL       RN NotifiedMeter: [id number]: [id number] CHIKIS BOSS       CBC & Differential [[id number]] Collected:  12/14/17 0546    Specimen:  Blood Updated:  12/14/17 0613    Narrative:       The following orders were created for panel order CBC & Differential.  Procedure                               Abnormality         Status                     ---------                               -----------         ------                     CBC Auto Differential[[id number]]        Abnormal            Final result                 Please view results for these tests on the individual orders.    CBC Auto Differential " [[id number]]  (Abnormal) Collected:  12/14/17 0546    Specimen:  Blood Updated:  12/14/17 0613     WBC 8.42 10*3/mm3      RBC 4.07 10*6/mm3      Hemoglobin 11.0 (L) g/dL      Hematocrit 33.4 (L) %      MCV 82.1 fL      MCH 27.0 pg      MCHC 32.9 g/dL      RDW 13.4 %      RDW-SD 40.4 fl      MPV 10.6 fL      Platelets 164 10*3/mm3      Neutrophil % 69.4 %      Lymphocyte % 19.4 %      Monocyte % 9.5 %      Eosinophil % 1.1 %      Basophil % 0.1 %      Immature Grans % 0.5 %      Neutrophils, Absolute 5.85 10*3/mm3      Lymphocytes, Absolute 1.63 10*3/mm3      Monocytes, Absolute 0.80 10*3/mm3      Eosinophils, Absolute 0.09 10*3/mm3      Basophils, Absolute 0.01 10*3/mm3      Immature Grans, Absolute 0.04 (H) 10*3/mm3     Basic Metabolic Panel [[id number]]  (Abnormal) Collected:  12/14/17 0546    Specimen:  Blood Updated:  12/14/17 0631     Glucose 181 (H) mg/dL      BUN 17 mg/dL      Creatinine 0.60 mg/dL      Sodium 132 (L) mmol/L      Potassium 3.5 mmol/L      Chloride 97 mmol/L      CO2 24.0 mmol/L      Calcium 9.0 mg/dL      eGFR Non African Amer 97 mL/min/1.73      BUN/Creatinine Ratio 28.3 (H)     Anion Gap 11.0 mmol/L     Narrative:       The MDRD GFR formula is only valid for adults with stable renal function between ages 18 and 70.    POC Glucose Once [[id number]]  (Abnormal) Collected:  12/14/17 0708    Specimen:  Blood Updated:  12/14/17 0730     Glucose 184 (H) mg/dL       RN NotifiedMeter: [id number]: [id number] HUSK MANDIE       POC Glucose Once [[id number]]  (Abnormal) Collected:  12/14/17 1026    Specimen:  Blood Updated:  12/14/17 1052     Glucose 193 (H) mg/dL       RN NotifiedMeter: [id number]: [id number] HUSK MANDIE

## 2017-12-15 LAB
ANION GAP SERPL CALCULATED.3IONS-SCNC: 12 MMOL/L (ref 5–15)
BASOPHILS # BLD AUTO: 0.02 10*3/MM3 (ref 0–0.2)
BASOPHILS NFR BLD AUTO: 0.2 % (ref 0–2)
BUN BLD-MCNC: 20 MG/DL (ref 7–21)
BUN/CREAT SERPL: 29 (ref 7–25)
CALCIUM SPEC-SCNC: 8.7 MG/DL (ref 8.4–10.2)
CHLORIDE SERPL-SCNC: 96 MMOL/L (ref 95–110)
CO2 SERPL-SCNC: 25 MMOL/L (ref 22–31)
CREAT BLD-MCNC: 0.69 MG/DL (ref 0.5–1)
DEPRECATED RDW RBC AUTO: 40.2 FL (ref 36.4–46.3)
EOSINOPHIL # BLD AUTO: 0.11 10*3/MM3 (ref 0–0.7)
EOSINOPHIL NFR BLD AUTO: 1.1 % (ref 0–7)
ERYTHROCYTE [DISTWIDTH] IN BLOOD BY AUTOMATED COUNT: 13.4 % (ref 11.5–14.5)
GFR SERPL CREATININE-BSD FRML MDRD: 82 ML/MIN/1.73 (ref 60–90)
GLUCOSE BLD-MCNC: 147 MG/DL (ref 60–100)
GLUCOSE BLDC GLUCOMTR-MCNC: 147 MG/DL (ref 70–130)
GLUCOSE BLDC GLUCOMTR-MCNC: 158 MG/DL (ref 70–130)
GLUCOSE BLDC GLUCOMTR-MCNC: 194 MG/DL (ref 70–130)
GLUCOSE BLDC GLUCOMTR-MCNC: 199 MG/DL (ref 70–130)
HCT VFR BLD AUTO: 34.9 % (ref 35–45)
HGB BLD-MCNC: 11.5 G/DL (ref 12–15.5)
IMM GRANULOCYTES # BLD: 0.03 10*3/MM3 (ref 0–0.02)
IMM GRANULOCYTES NFR BLD: 0.3 % (ref 0–0.5)
LYMPHOCYTES # BLD AUTO: 1.96 10*3/MM3 (ref 0.6–4.2)
LYMPHOCYTES NFR BLD AUTO: 18.8 % (ref 10–50)
MCH RBC QN AUTO: 26.9 PG (ref 26.5–34)
MCHC RBC AUTO-ENTMCNC: 33 G/DL (ref 31.4–36)
MCV RBC AUTO: 81.7 FL (ref 80–98)
MONOCYTES # BLD AUTO: 0.87 10*3/MM3 (ref 0–0.9)
MONOCYTES NFR BLD AUTO: 8.3 % (ref 0–12)
NEUTROPHILS # BLD AUTO: 7.43 10*3/MM3 (ref 2–8.6)
NEUTROPHILS NFR BLD AUTO: 71.3 % (ref 37–80)
PLATELET # BLD AUTO: 172 10*3/MM3 (ref 150–450)
PMV BLD AUTO: 10.9 FL (ref 8–12)
POTASSIUM BLD-SCNC: 3.8 MMOL/L (ref 3.5–5.1)
RBC # BLD AUTO: 4.27 10*6/MM3 (ref 3.77–5.16)
SODIUM BLD-SCNC: 133 MMOL/L (ref 137–145)
WBC NRBC COR # BLD: 10.42 10*3/MM3 (ref 3.2–9.8)

## 2017-12-15 PROCEDURE — 97530 THERAPEUTIC ACTIVITIES: CPT | Performed by: PHYSICAL THERAPIST

## 2017-12-15 PROCEDURE — G0378 HOSPITAL OBSERVATION PER HR: HCPCS

## 2017-12-15 PROCEDURE — 82962 GLUCOSE BLOOD TEST: CPT

## 2017-12-15 PROCEDURE — 80048 BASIC METABOLIC PNL TOTAL CA: CPT | Performed by: NURSE PRACTITIONER

## 2017-12-15 PROCEDURE — G8979 MOBILITY GOAL STATUS: HCPCS | Performed by: PHYSICAL THERAPIST

## 2017-12-15 PROCEDURE — 97162 PT EVAL MOD COMPLEX 30 MIN: CPT | Performed by: PHYSICAL THERAPIST

## 2017-12-15 PROCEDURE — G8978 MOBILITY CURRENT STATUS: HCPCS | Performed by: PHYSICAL THERAPIST

## 2017-12-15 PROCEDURE — 63710000001 INSULIN ASPART PER 5 UNITS: Performed by: NURSE PRACTITIONER

## 2017-12-15 PROCEDURE — 97762: CPT | Performed by: PHYSICAL THERAPIST

## 2017-12-15 PROCEDURE — 85025 COMPLETE CBC W/AUTO DIFF WBC: CPT | Performed by: NURSE PRACTITIONER

## 2017-12-15 RX ORDER — DOCUSATE SODIUM 100 MG/1
100 CAPSULE, LIQUID FILLED ORAL 2 TIMES DAILY
Status: DISCONTINUED | OUTPATIENT
Start: 2017-12-15 | End: 2017-12-19 | Stop reason: HOSPADM

## 2017-12-15 RX ADMIN — ONDANSETRON 4 MG: 4 TABLET, FILM COATED ORAL at 09:10

## 2017-12-15 RX ADMIN — PANTOPRAZOLE SODIUM 40 MG: 40 TABLET, DELAYED RELEASE ORAL at 06:26

## 2017-12-15 RX ADMIN — MAGNESIUM HYDROXIDE 10 ML: 2400 SUSPENSION ORAL at 16:07

## 2017-12-15 RX ADMIN — HYDROCHLOROTHIAZIDE 12.5 MG: 12.5 CAPSULE ORAL at 09:10

## 2017-12-15 RX ADMIN — INSULIN ASPART 2 UNITS: 100 INJECTION, SOLUTION INTRAVENOUS; SUBCUTANEOUS at 22:01

## 2017-12-15 RX ADMIN — DOCUSATE SODIUM 100 MG: 100 CAPSULE, LIQUID FILLED ORAL at 12:53

## 2017-12-15 RX ADMIN — HYDROCODONE BITARTRATE AND ACETAMINOPHEN 1 TABLET: 5; 325 TABLET ORAL at 22:01

## 2017-12-15 RX ADMIN — METFORMIN HYDROCHLORIDE 1000 MG: 500 TABLET ORAL at 17:24

## 2017-12-15 RX ADMIN — DOCUSATE SODIUM 100 MG: 100 CAPSULE, LIQUID FILLED ORAL at 17:24

## 2017-12-15 RX ADMIN — GLIPIZIDE 5 MG: 5 TABLET ORAL at 09:10

## 2017-12-15 RX ADMIN — INSULIN ASPART 2 UNITS: 100 INJECTION, SOLUTION INTRAVENOUS; SUBCUTANEOUS at 11:41

## 2017-12-15 RX ADMIN — LISINOPRIL 2.5 MG: 2.5 TABLET ORAL at 09:10

## 2017-12-15 RX ADMIN — METFORMIN HYDROCHLORIDE 1000 MG: 500 TABLET ORAL at 09:10

## 2017-12-15 RX ADMIN — Medication 12.5 MG: at 09:10

## 2017-12-15 RX ADMIN — INSULIN ASPART 2 UNITS: 100 INJECTION, SOLUTION INTRAVENOUS; SUBCUTANEOUS at 17:24

## 2017-12-15 RX ADMIN — ASPIRIN 81 MG 81 MG: 81 TABLET ORAL at 09:10

## 2017-12-15 RX ADMIN — ATORVASTATIN CALCIUM 20 MG: 20 TABLET, FILM COATED ORAL at 09:10

## 2017-12-15 RX ADMIN — GLIPIZIDE 5 MG: 5 TABLET ORAL at 17:24

## 2017-12-15 NOTE — PLAN OF CARE
Problem: Patient Care Overview (Adult)  Goal: Plan of Care Review  Outcome: Ongoing (interventions implemented as appropriate)    12/15/17 0801   Coping/Psychosocial Response Interventions   Plan Of Care Reviewed With patient   Patient Care Overview   Progress no change   Outcome Evaluation   Outcome Summary/Follow up Plan no acute changes       Goal: Adult Individualization and Mutuality  Outcome: Ongoing (interventions implemented as appropriate)  Goal: Discharge Needs Assessment  Outcome: Ongoing (interventions implemented as appropriate)    Problem: Orthopaedic Fracture (Adult)  Goal: Signs and Symptoms of Listed Potential Problems Will be Absent or Manageable (Orthopaedic Fracture)  Outcome: Ongoing (interventions implemented as appropriate)    Problem: Fall Risk (Adult)  Goal: Absence of Falls  Outcome: Ongoing (interventions implemented as appropriate)

## 2017-12-15 NOTE — PROGRESS NOTES
[facility] Medicine Services  INPATIENT PROGRESS NOTE    Length of Stay: 2  Date of Admission: 12/13/2017  Primary Care Physician: Troy Cheng MD    Subjective   Chief Complaint: Fall, back pain  HPI:  77 year old female with a history of DM II, HTN, and osteoporosis who suffered a fall and sustained fractures to her left pubic ramus, L2, and T12.  She was evaluated by spine/orthopedics who recommend brace and pain control, no surgical management.  She reports her pain is controlled.  She notes constipation.     Review of Systems   Constitutional: Negative for chills and fever.   Respiratory: Negative for shortness of breath.    Cardiovascular: Negative for chest pain and palpitations.   Gastrointestinal: Positive for constipation. Negative for abdominal pain, diarrhea, nausea and vomiting.   Musculoskeletal: Positive for back pain.      All pertinent negatives and positives are as above. All other systems have been reviewed and are negative unless otherwise stated.     Objective    Temp:  [96.6 °F (35.9 °C)-99.8 °F (37.7 °C)] 97 °F (36.1 °C)  Heart Rate:  [] 95  Resp:  [18] 18  BP: (119-159)/(57-74) 122/73    Physical Exam   Constitutional: She is oriented to person, place, and time. She appears well-developed and well-nourished.   HENT:   Head: Normocephalic and atraumatic.   Cardiovascular: Normal rate, regular rhythm, normal heart sounds and intact distal pulses.    Pulmonary/Chest: Effort normal and breath sounds normal. No respiratory distress.   Abdominal: Soft. Bowel sounds are normal. She exhibits no distension. There is no tenderness.   Musculoskeletal: Normal range of motion. She exhibits no edema or deformity.   Neurological: She is alert and oriented to person, place, and time. She exhibits normal muscle tone.   Skin: Skin is warm and dry. No erythema.   Vitals reviewed.    Results Review:  I have reviewed the labs, radiology results, and  diagnostic studies.    Laboratory Data:     Results from last 7 days  Lab Units 12/15/17  0541 12/14/17  0546   SODIUM mmol/L 133* 132*   POTASSIUM mmol/L 3.8 3.5   CHLORIDE mmol/L 96 97   CO2 mmol/L 25.0 24.0   BUN mg/dL 20 17   CREATININE mg/dL 0.69 0.60   GLUCOSE mg/dL 147* 181*   CALCIUM mg/dL 8.7 9.0   ANION GAP mmol/L 12.0 11.0     Estimated Creatinine Clearance: 56 mL/min (by C-G formula based on Cr of 0.69).            Results from last 7 days  Lab Units 12/15/17  0541 12/14/17  0546   WBC 10*3/mm3 10.42* 8.42   HEMOGLOBIN g/dL 11.5* 11.0*   HEMATOCRIT % 34.9* 33.4*   PLATELETS 10*3/mm3 172 164           Culture Data:   No results found for: BLOODCX  No results found for: URINECX  No results found for: RESPCX  No results found for: WOUNDCX  No results found for: STOOLCX  No components found for: BODYFLD    Radiology Data:   Imaging Results (last 24 hours)     ** No results found for the last 24 hours. **          I have reviewed the patient current medications.     Assessment/Plan     Hospital Problem List     * (Principal)Closed T12 spinal fracture    Closed fracture of ramus of left pubis    Closed fracture of second lumbar vertebra    HTN (hypertension)    Type 2 diabetes mellitus          Plan:    Back brace to be placed today  Pain control: PRN norco, PRN morphine  PT/OT  BP control with atenolol, lisinopril, and HCTZ  Glucose control with metformin/glipizide/SSI  Add colace and PRN Milk of mag  Discharge planning after therapy assessment         This document has been electronically signed by DEANNA Cordova on December 15, 2017 1:20 PM

## 2017-12-15 NOTE — PLAN OF CARE
Problem: Patient Care Overview (Adult)  Goal: Plan of Care Review  Outcome: Ongoing (interventions implemented as appropriate)    12/15/17 0323   Coping/Psychosocial Response Interventions   Plan Of Care Reviewed With patient;spouse   Patient Care Overview   Progress no change   Outcome Evaluation   Outcome Summary/Follow up Plan VSS, pain is controlled       Goal: Adult Individualization and Mutuality  Outcome: Ongoing (interventions implemented as appropriate)  Goal: Discharge Needs Assessment  Outcome: Ongoing (interventions implemented as appropriate)    Problem: Orthopaedic Fracture (Adult)  Goal: Signs and Symptoms of Listed Potential Problems Will be Absent or Manageable (Orthopaedic Fracture)  Outcome: Ongoing (interventions implemented as appropriate)    Problem: Fall Risk (Adult)  Goal: Absence of Falls  Outcome: Ongoing (interventions implemented as appropriate)

## 2017-12-15 NOTE — DISCHARGE PLACEMENT REQUEST
"Keagan Carr (77 y.o. Female)     Date of Birth Social Security Number Address Home Phone MRN    1940  [address] [address] [phone] [id number]    Evangel[employer] Marital Status          Jewish        Admission Date Admission Type Admitting Provider Attending Provider Department, Room/Bed    12/13/17 Emergency Perico Gardiner MD Gibson, Bryce, MD [address], 355/1    Discharge Date Discharge Disposition Discharge Destination                      Attending Provider: Perico Gardiner MD     Allergies:  Niacin And Related, Other    Isolation:  None   Infection:  None   Code Status:  FULL    Ht:  162.6 cm (64\")   Wt:  70.9 kg (156 lb 3.2 oz)    Admission Cmt:  None   Principal Problem:  Closed T12 spinal fracture [S22.089A]                 Active Insurance as of 12/13/2017     Primary Coverage     Payor Plan Insurance Group Employer/Plan Group    HUMANA MEDICARE REPLACEMENT HUMANA MEDICARE REPL [id number]     Payor Plan Address Payor Plan Phone Number Effective From Effective To    [address] [address] [phone] 1/1/2012     [place] [address]       Subscriber Name Subscriber Birth Date Member ID       KEAGAN CARR 1940 [id number]                 Emergency Contacts      (Rel.) Home Phone Work Phone Mobile Phone    Abdulaziz Carr (Spouse) [phone] -- [phone]    April Grady (Daughter) [phone] -- [phone]        Kathie Grimm RN [facility]  [phone] phone  [phone] fax    Pass Number 1:  \"Sign\" transmittal event        Order Parameters      Order ID: [id number]     Procedure/Medication: Initiate Observation Status     Patient: KEAGAN CARR [[id number]]     Encounter Date: 12/13/17 (JOHNY: 21544.99)     Encounter Type: Hospital Encounter [3]     Department: [address] [[id number]]     Order Workstation: [employer] [[id number]]     Order OTG:      Order Sent By: Kathie Grimm RN [[id number]]     "   Rules Used      Patient (1): [facility] FACILITY RULE [1]       Destinations      Descriptor: pat_patient_update     Record: PAT_PATIENT_UPDATE [[id number]]     Map Used: [facility] FACILITY MAP [1]     Destination Workstation: EPIC FS SYSTEM - PATIENT UPDATE WORKSTATION [370]     Report: DUMMY REPORT [1199]     Group?:      Count: 1             Local Cached Properties      Pt_class = 101     Encounter_dept = [id number]     Frequency = [id number]     Is_order_complete = Yes     Order_mode = 2     Order_type = 47     Procedure_category = 34       Modified Properties      Property is_order_complete set to Yes       General Orders Info      Order Status: Completed [5]

## 2017-12-15 NOTE — THERAPY EVALUATION
Acute Care - Physical Therapy Initial Evaluation  [facility]     Patient Name: Susanna Carr  : 1940  MRN: [id number]  Today's Date: 12/15/2017   Onset of Illness/Injury or Date of Surgery Date: 17  Date of Referral to PT: 12/15/17  Referring Physician: Puneet HUERTA      Admit Date: 2017     Visit Dx:    ICD-10-CM ICD-9-CM   1. Pubic ramus fracture, left, closed, initial encounter S32.592A 808.2   2. Other closed fracture of twelfth thoracic vertebra, initial encounter S22.088A 805.2   3. Closed compression fracture of second lumbar vertebra, initial encounter S32.020A 805.4   4. Impaired physical mobility Z74.09 781.99     Patient Active Problem List   Diagnosis   • Closed fracture of ramus of left pubis   • Pathological fracture of vertebra, initial encounter   • Closed T12 spinal fracture   • Closed fracture of second lumbar vertebra   • HTN (hypertension)   • Type 2 diabetes mellitus     Past Medical History:   Diagnosis Date   • Cancer    • Diabetes mellitus    • Hypertension    • Irregular heart beat      Past Surgical History:   Procedure Laterality Date   • ANKLE SURGERY     • CHOLECYSTECTOMY            PT ASSESSMENT (last 72 hours)      PT Evaluation       12/15/17 1335 17 1400    Rehab Evaluation    Document Type evaluation  -CB     Subjective Information agree to therapy  -CB     Patient Effort, Rehab Treatment good  -CB     Symptoms Noted During/After Treatment increased pain  -CB     General Information    Patient Profile Review yes  -CB     Onset of Illness/Injury or Date of Surgery Date 17  -CB     Referring Physician Puneet HUERTA  -CB     General Observations laying in bed with telemetry, IV, cath and SCD,  present  -CB     Pertinent History Of Current Problem fall down flight of stairs at night when going to bathroom  -CB     Precautions/Limitations fall precautions;spinal precautions;brace on when up  -CB     Prior Level of Function  independent:;gait;ADL's  -CB     Equipment Currently Used at Home none  -CB none  -LC    Plans/Goals Discussed With patient;spouse/S.O.  -CB     Risks Reviewed patient and family:;increased discomfort;change in vital signs  -CB     Benefits Reviewed patient and family:;improve function;decrease pain;decrease risk of DVT  -CB     Living Environment    Lives With spouse  -CB spouse  -LC    Living Arrangements house  -CB house  -LC    Home Accessibility stairs to enter home;stairs within home  -CB stairs within home  -LC    Number of Stairs to Enter Home 2  -CB     Number of Stairs Within Home 11  -CB     Stair Railings at Home inside, present on left side   outside - none  -CB     Transportation Available car;family or friend will provide  -CB     Living Environment Comment  is putting a lock on basement door to prevent her from going there again   -CB     Clinical Impression    Date of Referral to PT 12/15/17  -CB     PT Diagnosis impaired physical mobility due to compression frx at L2, inferior pubic rami frx on L and transverse frx T 12  -CB     Criteria for Skilled Therapeutic Interventions Met treatment indicated;yes  -CB     Pathology/Pathophysiology Noted (Describe Specifically for Each System) musculoskeletal  -CB     Impairments Found (describe specific impairments) aerobic capacity/endurance;gait, locomotion, and balance  -CB     Functional Limitations in Following Categories (Describe Specific Limitations) self-care;home management  -CB     Rehab Potential good, to achieve stated therapy goals  -CB     Predicted Duration of Therapy Intervention (days/wks) until goals met or discharged  -CB     Vital Signs    Pre Systolic BP Rehab 120  -CB     Pre Treatment Diastolic BP 66  -CB     Post Systolic BP Rehab 100  -CB     Post Treatment Diastolic BP 64  -CB     Pretreatment Heart Rate (beats/min) 92  -CB     Posttreatment Heart Rate (beats/min) 96  -CB     Pre SpO2 (%) 96  -CB     O2 Delivery Pre Treatment  room air  -CB     Post SpO2 (%) 98  -CB     O2 Delivery Post Treatment room air  -CB     Pre Patient Position Supine  -CB     Intra Patient Position Standing  -CB     Post Patient Position Sitting  -CB     Pain Assessment    Pain Assessment 0-10  -CB     Pain Score 2  -CB     Post Pain Score 5  -CB     Pain Location Back  -CB     Pain Orientation Lower;Left  -CB     Pain Intervention(s) Ambulation/increased activity  -CB     Vision Assessment/Intervention    Visual Impairment WFL with corrective lenses  -CB     Cognitive Assessment/Intervention    Current Cognitive/Communication Assessment functional  -CB     Orientation Status oriented x 4  -CB     Follows Commands/Answers Questions 100% of the time  -CB     Personal Safety WNL/WFL  -CB     Personal Safety Interventions fall prevention program maintained;gait belt;nonskid shoes/slippers when out of bed  -CB     ROM (Range of Motion)    General ROM no range of motion deficits identified  -CB     MMT (Manual Muscle Testing)    General MMT Assessment lower extremity strength deficits identified  -CB     General MMT Assessment Detail grossly LLE2/5 hip and knee 4/5 ankle due to pain, RLE 3/5 hip and knee 4/5 ankle  -CB     Mobility Assessment/Training    Extremity Weight-Bearing Status left lower extremity;right lower extremity  -CB     Left Lower Extremity Weight-Bearing weight-bearing as tolerated  -CB     Bed Mobility, Assessment/Treatment    Bed Mobility, Assistive Device bed rails  -CB     Bed Mobility, Roll Right, [place] moderate assist (50% patient effort)  -CB     Bed Mob, Sidelying to Sit, [place] moderate assist (50% patient effort)  -CB     Bed Mobility, Safety Issues decreased use of legs for bridging/pushing  -CB     Bed Mobility, Impairments pain  -CB     Transfer Assessment/Treatment    Transfers, Sit-Stand [place] minimum assist (75% patient effort);contact guard assist  -CB     Transfers, Stand-Sit [place] minimum assist (75%  patient effort)  -CB     Transfers, Sit-Stand-Sit, Assist Device rolling walker  -CB     Transfer, Safety Issues weight-shifting ability decreased  -CB     Transfer, Impairments strength decreased;pain  -CB     Transfer, Comment not able to put enough weight thru UE to take a bir step with RLE due to pain  -CB     Gait Assessment/Treatment    Gait, [place] Level minimum assist (75% patient effort)  -CB     Gait, Assistive Device rolling walker  -CB     Gait, Distance (Feet) 5  -CB     Gait, Safety Issues weight-shifting ability decreased  -CB     Gait, Impairments pain;strength decreased  -CB     Orthotics Prosthetics    Additional Documentation Orthosis Location (Group);Orthosis Management/Training (Group)  -CB     Orthosis Location    Orthosis Location/Type neck/back  -CB     Orthosis, Neck/Back TLSO (thoracic lumbar sacral orthosis)  -CB     Orthosis Management/Training    Orthosis Indications rest, reduce pain;mobilize, increase limited range of motion;increase function  -CB     Orthosis Skills Training doffing orthosis;donning orthosis;recognizing skin issues related to orthosis;restrictions/precautions;sleeping without orthosis  -CB     Orthosis Skills Training Comment  will bring T shirt tomorrow to wear under TLSO  -CB     Orthosis Wear Schedule wear when out of bed only  -CB     Orthosis Skin Assessment skin is intact, no issues w/ skin integrity  -CB     Sensory Assessment/Intervention    Light Touch LLE;RLE  -CB     LLE Light Touch WNL  -CB     RLE Light Touch WNL  -CB     Positioning and Restraints    Pre-Treatment Position in bed  -CB     Post Treatment Position chair  -CB     In Chair sitting;call light within reach;encouraged to call for assist;with family/caregiver  -CB       User Key  (r) = Recorded By, (t) = Taken By, (c) = Cosigned By    Initials Name Provider Type    CB Jennifer Agarwal, PT Physical Therapist    LC Ya Bragg, RN Registered Nurse          Physical Therapy  Education     Title: PT OT SLP Therapies (Active)     Topic: Physical Therapy (Active)     Point: Mobility training (Active)    Learning Progress Summary    Learner Readiness Method Response Comment Documented by Status   Patient Acceptance D NR  CB 12/15/17 1540 Active   Family Acceptance D NR  CB 12/15/17 1540 Active               Point: Home exercise program (Active)    Learning Progress Summary    Learner Readiness Method Response Comment Documented by Status   Patient Acceptance D NR  CB 12/15/17 1540 Active   Family Acceptance D NR  CB 12/15/17 1540 Active               Point: Body mechanics (Active)    Learning Progress Summary    Learner Readiness Method Response Comment Documented by Status   Patient Acceptance D NR  CB 12/15/17 1540 Active   Family Acceptance D NR  CB 12/15/17 1540 Active               Point: Precautions (Active)    Learning Progress Summary    Learner Readiness Method Response Comment Documented by Status   Patient Acceptance D NR  CB 12/15/17 1540 Active   Family Acceptance D NR   12/15/17 1540 Active                      User Key     Initials Effective Dates Name Provider Type Discipline     04/06/17 -  Jennifer Agarwal, PT Physical Therapist PT                PT Recommendation and Plan  Anticipated Equipment Needs At Discharge:  (none)  Anticipated Discharge Disposition: home with home health  Demonstrates Need for Referral to Another Service: home health care  Planned Therapy Interventions: bed mobility training, gait training, home exercise program, orthotic fitting/training, patient/family education, stair training, strengthening, transfer training  PT Frequency: per priority policy (5-14)  Plan of Care Review  Plan Of Care Reviewed With: patient, spouse  Outcome Summary/Follow up Plan: PT eval completed, was fitted for TLSO, instructed in log rolling and how to don and door brace while log rolling,  observed, will bring patient T shirt to wear under TLSO and be available  to learn how to assist with brace application after ( AM tomorrowm will bring walker from home to be checked out for correct height and proper working condition, patient has decreased strength in LLE due to pain, required mod assit to roll and don brace, once sittign able to come to stand with min to CGA of  one, able to ambulate iwth rolling wlaker 5 feet with TLSO on and rolling walker and CGA to min of one, left sitting up in recliner, could benefit from skilled PT to rgain and return to highest levelf of function in bed mobility with log rolling, don and doff TLSO and instruction of , strengthening and gait          IP PT Goals       12/15/17 1335          Bed Mobility PT STG    Bed Mobility PT STG, Date Established 12/15/17  -CB      Bed Mobility PT STG, Time to Achieve 2 days  -CB      Bed Mobility PT STG, Activity Type roll left/roll right;sidelying to sit/sit to sidelying  -CB      Bed Mobility PT STG, [place] Level contact guard assist  -CB      Bed Mobility PT STG, Additional Goal log rolling with HOB down and no rails,  may assist  -CB      Transfer Training PT STG    Transfer Training PT STG, Date Established 12/15/17  -CB      Transfer Training PT STG, Activity Type bed to chair /chair to bed;sit to stand/stand to sit  -CB      Transfer Training PT STG, [place] Level contact guard assist  -CB      Transfer Training PT STG, Assist Device walker, rolling  -CB      Gait Training PT LTG    Gait Training Goal PT LTG, Date Established 12/15/17  -CB      Gait Training Goal PT LTG, Time to Achieve 2 days  -CB      Gait Training Goal PT LTG, [place] Level contact guard assist  -CB      Gait Training Goal PT LTG, Assist Device walker, rolling  -CB      Gait Training Goal PT LTG, Distance to Achieve 50 feet  -CB      Stair Training PT LTG    Stair Training Goal PT LTG, Date Established 12/15/17  -CB      Stair Training Goal PT LTG, Time to Achieve 2 days  -CB      Stair Training Goal  PT LTG, Number of Steps 2  -CB      Stair Training Goal PT LTG, [place] Level contact guard assist  -CB      Stair Training Goal PT LTG, Assist Device walker, rolling  -CB      Strength Goal PT LTG    Strength Goal PT LTG, Date Established 12/15/17  -CB      Strength Goal PT LTG, Time to Achieve 2 days  -CB      Strength Goal PT LTG, Measure to Achieve 10 reps all ex BLE actively  -CB      Physical Therapy PT STG    Physical Therapy PT STG, Date Established 12/15/17  -CB      Physical Therapy PT STG, Time to Achieve 2 days  -CB      Physical Therapy PT STG, Activity Type be able to don and doff TLSO with assist of   -CB      Physical Therapy PT STG, [place] Level independent  -CB      Physical Therapy PT STG, Additional Goal do log rolling and protect spine  -CB        User Key  (r) = Recorded By, (t) = Taken By, (c) = Cosigned By    Initials Name Provider Type    MALOU Agarwal, PT Physical Therapist                Outcome Measures       12/15/17 1335          How much help from another person do you currently need...    Turning from your back to your side while in flat bed without using bedrails? 2  -CB      Moving from lying on back to sitting on the side of a flat bed without bedrails? 2  -CB      Moving to and from a bed to a chair (including a wheelchair)? 2  -CB      Standing up from a chair using your arms (e.g., wheelchair, bedside chair)? 3  -CB      Climbing 3-5 steps with a railing? 2  -CB      To walk in hospital room? 2  -CB      AM-PAC 6 Clicks Score 13  -CB      Functional Assessment    Outcome Measure Options AM-PAC 6 Clicks Basic Mobility (PT)  -CB        User Key  (r) = Recorded By, (t) = Taken By, (c) = Cosigned By    Initials Name Provider Type    MALOU Agarwal, PT Physical Therapist           Time Calculation:         PT Charges       12/15/17 1556          Time Calculation    Start Time 1335  -CB      Stop Time 1425  -CB      Time Calculation (min) 50 min  -CB      PT  Received On 12/15/17  -CB      PT Goal Re-Cert Due Date 12/28/17  -CB      Time Calculation- PT    Total Timed Code Minutes- PT 35 minute(s)  -CB        User Key  (r) = Recorded By, (t) = Taken By, (c) = Cosigned By    Initials Name Provider Type    CB Jennifer Agarwal, PT Physical Therapist          Therapy Charges for Today     Code Description Service Date Service Provider Modifiers Qty    22051928046 HC PT MOBILITY CURRENT 12/15/2017 Jennifer Agarwal, PT GP, CK 1    53800747658 HC PT MOBILITY PROJECTED 12/15/2017 Jennifer Agarwal, PT GP, CJ 1    33572716872 HC PT EVAL MOD COMPLEXITY 1 12/15/2017 Jennifer Agarwal, PT GP 1    41694110771 HC ORTHO/PROSTHET CHECKOUT EA 15 MIN 12/15/2017 Jennifer Agarwal, PT GP 1    20252241300 HC PT THERAPEUTIC ACT EA 15 MIN 12/15/2017 Jennifer Agarwal, PT GP 1          PT G-Codes  PT Professional Judgement Used?: Yes  Outcome Measure Options: AM-PAC 6 Clicks Basic Mobility (PT)  Score: 13  Functional Limitation: Mobility: Walking and moving around  Mobility: Walking and Moving Around Current Status (): At least 40 percent but less than 60 percent impaired, limited or restricted  Mobility: Walking and Moving Around Goal Status (): At least 20 percent but less than 40 percent impaired, limited or restricted      Jennifer Agarwal, PT  12/15/2017

## 2017-12-15 NOTE — PLAN OF CARE
Problem: Patient Care Overview (Adult)  Goal: Plan of Care Review  Outcome: Ongoing (interventions implemented as appropriate)    12/15/17 1335   Coping/Psychosocial Response Interventions   Plan Of Care Reviewed With patient;spouse   Outcome Evaluation   Outcome Summary/Follow up Plan PT [place] completed, was fitted for TLSO, instructed in log rolling and how to don and door brace while log rolling,  observed, will bring patient T shirt to wear under TLSO and be available to learn how to assist with brace application after ( AM tomorrowm will bring walker from home to be checked out for correct height and proper working condition, patient has decreased strength in LLE due to pain, required mod assit to roll and don brace, once sittign able to come to stand with min to CGA of one, able to ambulate iwth rolling wlaker 5 feet with TLSO on and rolling walker and CGA to min of one, left sitting up in recliner, could benefit from skilled PT to rgain and return to highest levelf of function in bed mobility with log rolling, don and doff TLSO and instruction of , strengthening and gait       Goal: Discharge Needs Assessment  Outcome: Ongoing (interventions implemented as appropriate)    12/15/17 1335   Discharge Needs Assessment   Concerns To Be Addressed no discharge needs identified   Equipment Needed After Discharge walker, rolling  (has rolling walker at home )   Discharge Facility/Level Of Care Needs home with home health   Current Discharge Risk physical impairment   Discharge Disposition still a patient   Current Health   Outpatient/Agency/Support Group Needs homecare agency (specify level of care)   Living Environment   Transportation Available car;family or friend will provide   Self-Care   Equipment Currently Used at Home none         Problem: Inpatient Physical Therapy  Goal: Bed Mobility Goal STG- PT  Outcome: Ongoing (interventions implemented as appropriate)    12/15/17 1335   Bed Mobility PT STG    Bed Mobility PT STG, Date Established 12/15/17   Bed Mobility PT STG, Time to Achieve 2 days   Bed Mobility PT STG, Activity Type roll left/roll right;sidelying to sit/sit to sidelying   Bed Mobility PT STG, [place] Level contact guard assist   Bed Mobility PT STG, Additional Goal log rolling with HOB down and no rails,  may assist       Goal: Transfer Training Goal 1 STG- PT  Outcome: Ongoing (interventions implemented as appropriate)    12/15/17 1335   Transfer Training PT STG   Transfer Training PT STG, Date Established 12/15/17   Transfer Training PT STG, Activity Type bed to chair /chair to bed;sit to stand/stand to sit   Transfer Training PT STG, [place] Level contact guard assist   Transfer Training PT STG, Assist Device walker, rolling       Goal: Gait Training Goal LTG- PT  Outcome: Ongoing (interventions implemented as appropriate)    12/15/17 1335   Gait Training PT LTG   Gait Training Goal PT LTG, Date Established 12/15/17   Gait Training Goal PT LTG, Time to Achieve 2 days   Gait Training Goal PT LTG, [place] Level contact guard assist   Gait Training Goal PT LTG, Assist Device walker, rolling   Gait Training Goal PT LTG, Distance to Achieve 50 feet       Goal: Stair Training Goal LTG- PT  Outcome: Ongoing (interventions implemented as appropriate)  Goal: Strength Goal LTG- PT  Outcome: Ongoing (interventions implemented as appropriate)  Goal: Physical Therapy Goal STG- PT  Outcome: Ongoing (interventions implemented as appropriate)    12/15/17 1335   Physical Therapy PT STG   Physical Therapy PT STG, Date Established 12/15/17   Physical Therapy PT STG, Time to Achieve 2 days   Physical Therapy PT STG, Activity Type be able to don and doff TLSO with assist of    Physical Therapy PT STG, [place] Level independent   Physical Therapy PT STG, Additional Goal do log rolling and protect spine

## 2017-12-15 NOTE — PROGRESS NOTES
Patient has obtained brace, which she will wear to mobilize  Up with physical therapy for mobilization  Partial weight bearing of the left hip.  She will need to return for follow up with orthopaedic surgery in 3 weeks.

## 2017-12-16 LAB
GLUCOSE BLDC GLUCOMTR-MCNC: 134 MG/DL (ref 70–130)
GLUCOSE BLDC GLUCOMTR-MCNC: 135 MG/DL (ref 70–130)
GLUCOSE BLDC GLUCOMTR-MCNC: 140 MG/DL (ref 70–130)
GLUCOSE BLDC GLUCOMTR-MCNC: 239 MG/DL (ref 70–130)

## 2017-12-16 PROCEDURE — 97530 THERAPEUTIC ACTIVITIES: CPT

## 2017-12-16 PROCEDURE — 97116 GAIT TRAINING THERAPY: CPT

## 2017-12-16 PROCEDURE — 82962 GLUCOSE BLOOD TEST: CPT

## 2017-12-16 PROCEDURE — G8988 SELF CARE GOAL STATUS: HCPCS

## 2017-12-16 PROCEDURE — G0378 HOSPITAL OBSERVATION PER HR: HCPCS

## 2017-12-16 PROCEDURE — 63710000001 INSULIN ASPART PER 5 UNITS: Performed by: NURSE PRACTITIONER

## 2017-12-16 PROCEDURE — G8987 SELF CARE CURRENT STATUS: HCPCS

## 2017-12-16 PROCEDURE — 97535 SELF CARE MNGMENT TRAINING: CPT

## 2017-12-16 PROCEDURE — 97167 OT EVAL HIGH COMPLEX 60 MIN: CPT

## 2017-12-16 RX ORDER — METHOCARBAMOL 750 MG/1
750 TABLET, FILM COATED ORAL EVERY 8 HOURS PRN
Status: DISCONTINUED | OUTPATIENT
Start: 2017-12-16 | End: 2017-12-19 | Stop reason: HOSPADM

## 2017-12-16 RX ADMIN — INSULIN ASPART 4 UNITS: 100 INJECTION, SOLUTION INTRAVENOUS; SUBCUTANEOUS at 11:46

## 2017-12-16 RX ADMIN — METFORMIN HYDROCHLORIDE 1000 MG: 500 TABLET ORAL at 08:59

## 2017-12-16 RX ADMIN — PANTOPRAZOLE SODIUM 40 MG: 40 TABLET, DELAYED RELEASE ORAL at 05:44

## 2017-12-16 RX ADMIN — HYDROCODONE BITARTRATE AND ACETAMINOPHEN 1 TABLET: 5; 325 TABLET ORAL at 09:51

## 2017-12-16 RX ADMIN — DOCUSATE SODIUM 100 MG: 100 CAPSULE, LIQUID FILLED ORAL at 17:16

## 2017-12-16 RX ADMIN — ATORVASTATIN CALCIUM 20 MG: 20 TABLET, FILM COATED ORAL at 08:59

## 2017-12-16 RX ADMIN — GLIPIZIDE 5 MG: 5 TABLET ORAL at 17:16

## 2017-12-16 RX ADMIN — ASPIRIN 81 MG 81 MG: 81 TABLET ORAL at 08:59

## 2017-12-16 RX ADMIN — HYDROCODONE BITARTRATE AND ACETAMINOPHEN 1 TABLET: 5; 325 TABLET ORAL at 21:24

## 2017-12-16 RX ADMIN — LISINOPRIL 2.5 MG: 2.5 TABLET ORAL at 09:00

## 2017-12-16 RX ADMIN — GLIPIZIDE 5 MG: 5 TABLET ORAL at 09:00

## 2017-12-16 RX ADMIN — DOCUSATE SODIUM 100 MG: 100 CAPSULE, LIQUID FILLED ORAL at 08:59

## 2017-12-16 RX ADMIN — METHOCARBAMOL 750 MG: 750 TABLET ORAL at 17:16

## 2017-12-16 RX ADMIN — HYDROCHLOROTHIAZIDE 12.5 MG: 12.5 CAPSULE ORAL at 09:00

## 2017-12-16 RX ADMIN — MAGNESIUM HYDROXIDE 10 ML: 2400 SUSPENSION ORAL at 08:59

## 2017-12-16 RX ADMIN — METFORMIN HYDROCHLORIDE 1000 MG: 500 TABLET ORAL at 17:16

## 2017-12-16 RX ADMIN — Medication 12.5 MG: at 08:59

## 2017-12-16 NOTE — THERAPY EVALUATION
Acute Care - Occupational Therapy Initial Evaluation  [facility]     Patient Name: Susanna Carr  : 1940  MRN: [id number]  Today's Date: 2017  Onset of Illness/Injury or Date of Surgery Date: 17  Date of Referral to OT: 12/15/17  Referring Physician: Puneet HUERTA    Admit Date: 2017       ICD-10-CM ICD-9-CM   1. Pubic ramus fracture, left, closed, initial encounter S32.592A 808.2   2. Other closed fracture of twelfth thoracic vertebra, initial encounter S22.088A 805.2   3. Closed compression fracture of second lumbar vertebra, initial encounter S32.020A 805.4   4. Impaired physical mobility Z74.09 781.99   5. Impaired gait and mobility R26.89 781.2   6. Impaired mobility and ADLs Z74.09 799.89     Patient Active Problem List   Diagnosis   • Closed fracture of ramus of left pubis   • Pathological fracture of vertebra, initial encounter   • Closed T12 spinal fracture   • Closed fracture of second lumbar vertebra   • HTN (hypertension)   • Type 2 diabetes mellitus     Past Medical History:   Diagnosis Date   • Cancer    • Diabetes mellitus    • Hypertension    • Irregular heart beat      Past Surgical History:   Procedure Laterality Date   • ANKLE SURGERY     • CHOLECYSTECTOMY            OT ASSESSMENT FLOWSHEET (last 72 hours)      OT Evaluation       17 1029 17 0900 17 0730 12/15/17 1335 17 1400    Rehab Evaluation    Document Type evaluation  - therapy note (daily note)  -LN  evaluation  -CB     Subjective Information agree to therapy;complains of;pain  - agree to therapy;complains of;weakness;pain  -LN  agree to therapy  -CB     Patient Effort, Rehab Treatment good  -BH   good  -CB     Symptoms Noted During/After Treatment fatigue  -BH   increased pain  -CB     General Information    Patient Profile Review yes  -BH   yes  -CB     Onset of Illness/Injury or Date of Surgery Date 17  -BH   17  -CB     Referring Physician Puneet  Meliza COYLEN  -   Puneet Haider APRN  -     General Observations Pt sitting up in chair with TLSO on, tele, IV, family present  -   laying in bed with telemetry, IV, cath and SCD,  present  -     Pertinent History Of Current Problem fall down flight of stairs at night when going to bathroom  -   fall down flight of stairs at night when going to bathroom  -CB     Precautions/Limitations fall precautions;spinal precautions;brace on when up;non-weight bearing status   partial weight bear on LLE  - fall precautions;spinal precautions   tlso on when up pwb l le  -LN  fall precautions;spinal precautions;brace on when up  -CB     Prior Level of Function independent:;all household mobility;transfer;ADL's;home management    helped with laundry  -   independent:;gait;ADL's  -     Equipment Currently Used at Home none   RW brought in that was pt; pt reported none to OT  -   none  -CB none  -LC    Plans/Goals Discussed With patient  -   patient;spouse/S.O.  -CB     Risks Reviewed patient:;LOB;increased discomfort;change in vital signs  -   patient and family:;increased discomfort;change in vital signs  -CB     Benefits Reviewed patient:;improve function;increase independence;increase strength;increase balance  -   patient and family:;improve function;decrease pain;decrease risk of DVT  -CB     Living Environment    Lives With spouse  -   spouse  -CB spouse  -    Living Arrangements house  -   house  -CB house  -    Home Accessibility stairs to enter home;stairs within home;tub/shower is not walk in;grab bars present (bathtub);bed and bath on same level  -   stairs to enter home;stairs within home  -CB stairs within home  -    Number of Stairs to Enter Home 2  -   2  -CB     Number of Stairs Within Home 11  -   11  -CB     Stair Railings at Home --   outside none  -   inside, present on left side   outside - none  -CB     Transportation Available car;family or friend  will provide  -   car;family or friend will provide  -     Living Environment Comment family reports  put a safety feature on basement door, no need to go down.   -    is putting a lock on basement door to prevent her from going there again   -     Clinical Impression    Date of Referral to OT 12/15/17  -        OT Diagnosis impaired mobility and ADL  -        Prognosis fair+  -        Functional Level At Time Of Evaluation Pt was unable to maintain PWB on LLE. Pt took an extended time and effort to get from chair by bed to BSC to bed with min A of 2 for safety. Pt averaged mod-max assist with getting sitting to supine.   -        Impairments Found (describe specific impairments) aerobic capacity/endurance;gait, locomotion, and balance;joint integrity and mobility;motor function;muscle performance;integumentary integrity;posture   ADL, functional t/f and mobility, ax tolerance  -        Patient/Family Goals Statement to go home  -        Criteria for Skilled Therapeutic Interventions Met yes;treatment indicated  -        Rehab Potential good, to achieve stated therapy goals  -        Therapy Frequency other (see comments)   3-14x a week  -        Predicted Duration of Therapy Intervention (days/wks) until d/c or all goals met  -        Anticipated Equipment Needs At Discharge bathing equipment;bedside commode;dressing equipment;gait belt;raised toilet seat;reacher;shower chair  -        Anticipated Discharge Disposition home with 24/7 care;home with home health;home with outpatient services;skilled nursing facility;inpatient rehabilitation facility  -        Functional Level Prior    Ambulation 0-->independent  -BH    0-->independent  -    Transferring 0-->independent  -    0-->independent  -    Toileting 0-->independent  -BH    0-->independent  -    Bathing 0-->independent  -BH    0-->independent  -    Dressing 0-->independent  -BH    0-->independent  -LC     Eating 0-->independent  -BH    0-->independent  -LC    Communication 0-->understands/communicates without difficulty  -BH    0-->understands/communicates without difficulty  -LC    Swallowing 0-->swallows foods/liquids without difficulty  -BH    0-->swallows foods/liquids without difficulty  -LC    Prior Functional Level Comment     independent  -LC    Vital Signs    Pre Systolic BP Rehab 98  -  -LN  120  -CB     Pre Treatment Diastolic BP 55  -BH 63  -LN  66  -CB     Post Systolic BP Rehab 106  -  -LN  100  -CB     Post Treatment Diastolic BP 58  -BH 60  -LN  64  -CB     Pretreatment Heart Rate (beats/min) 93  -  -LN  92  -CB     Posttreatment Heart Rate (beats/min) 85  -  -LN  96  -CB     Pre SpO2 (%) 91  -BH 93  -LN  96  -CB     O2 Delivery Pre Treatment room air  -BH room air  -LN  room air  -CB     Post SpO2 (%) 97  -BH 91  -LN  98  -CB     O2 Delivery Post Treatment    room air  -CB     Pre Patient Position Sitting  -BH Supine  -LN  Supine  -CB     Intra Patient Position  Standing  -LN  Standing  -CB     Post Patient Position Supine  -BH Sitting  -LN  Sitting  -CB     Pain Assessment    Pain Assessment  0-10  -LN  0-10  -CB     Pain Score 3  -BH 0  -LN  2  -CB     Post Pain Score 3  -BH 9  -LN  5  -CB     Pain Type  Acute pain;Surgical pain  -LN       Pain Location Back   hip left side  - Back  -LN  Back  -CB     Pain Orientation  Lower   left hip  -LN  Lower;Left  -CB     Pain Intervention(s) Repositioned;Ambulation/increased activity   had pain pill per pt  - Medication (See MAR)  -LN  Ambulation/increased activity  -CB     Vision Assessment/Intervention    Visual Impairment WFL with corrective lenses  -   WFL with corrective lenses  -CB     Cognitive Assessment/Intervention    Current Cognitive/Communication Assessment functional  -   functional  -CB     Orientation Status oriented x 4  -BH oriented to;person     -LN  oriented x 4  -CB     Follows Commands/Answers  Questions 75% of the time;100% of the time;able to follow single-step instructions;needs cueing;needs increased time;needs repetition  -   100% of the time  -     Personal Safety mild impairment;WNL/WFL  -   WNL/WFL  -     Personal Safety Interventions fall prevention program maintained;gait belt;nonskid shoes/slippers when out of bed;muscle strengthening facilitated;supervised activity  -   fall prevention program maintained;gait belt;nonskid shoes/slippers when out of bed  -     ROM (Range of Motion)    General ROM no range of motion deficits identified  -   no range of motion deficits identified  -     General ROM Detail BUE WFL, fair + digit to nose and digit to thumb opposition  -        MMT (Manual Muscle Testing)    General MMT Assessment upper extremity strength deficits identified;lower extremity strength deficits identified  -   lower extremity strength deficits identified  -     General MMT Assessment Detail BUE  grossly 4/5; BUE grossly at least 3+/5 OT did not give much resistance.   -   grossly LLE2/5 hip and knee 4/5 ankle due to pain, RLE 3/5 hip and knee 4/5 ankle  -     Mobility Assessment/Training    Extremity Weight-Bearing Status  left lower extremity  -LN  left lower extremity;right lower extremity  -CB     Left Lower Extremity Weight-Bearing partial weight-bearing  -BH partial weight-bearing  -LN  weight-bearing as tolerated  -CB     Bed Mobility, Assessment/Treatment    Bed Mobility, Assistive Device  --   bed flat hr or mattress  -LN  bed rails  -CB     Bed Mobility, Roll Left, [place]  minimum assist (75% patient effort)  -LN       Bed Mobility, Roll Right, [place]  minimum assist (75% patient effort)  -LN  moderate assist (50% patient effort)  -CB     Bed Mobility, Scoot/Bridge, [place]  not tested  -LN       Bed Mob, Supine to Sit, [place]  moderate assist (50% patient effort)  -LN       Bed Mob, Sit to Supine, [place]  not tested   -LN       Bed Mob, Sidelying to Sit, [place]  moderate assist (50% patient effort)  -LN  moderate assist (50% patient effort)  -CB     Bed Mob, Sit to Sidelying, [place] moderate assist (50% patient effort);maximum assist (25% patient effort)  -        Bed Mobility, Safety Issues    decreased use of legs for bridging/pushing  -CB     Bed Mobility, Impairments    pain  -CB     Bed Mobility, Comment Pt required mod assist with legs for sit to sidelying to supine then mod assist with trunk for proper alignment and positioning in bed.   - spouse assisted with putting on tlso and with t/f's requiring v.c's  -LN       Transfer Assessment/Treatment    Transfers, Bed-Chair [place]  minimum assist (75% patient effort)  -LN       Transfers, Chair-Bed [place]  not tested  -LN       Transfers, Bed-Chair-Bed, Assist Device  rolling walker  -LN       Transfers, Sit-Stand [place] minimum assist (75% patient effort);2 person assist required  - minimum assist (75% patient effort);verbal cues required  -LN  minimum assist (75% patient effort);contact guard assist  -CB     Transfers, Stand-Sit [place] minimum assist (75% patient effort);2 person assist required  - minimum assist (75% patient effort)  -LN  minimum assist (75% patient effort)  -CB     Transfers, Sit-Stand-Sit, Assist Device rolling walker  -BH rolling walker  -LN  rolling walker  -CB     Toilet Transfer, [place] minimum assist (75% patient effort);2 person assist required  - not tested  -LN       Toilet Transfer, Assistive Device bedside commode with drop arms  -        Transfer, Safety Issues weight-shifting ability decreased  -   weight-shifting ability decreased  -CB     Transfer, Impairments strength decreased;unable to maintain weight bearing status  -   strength decreased;pain  -CB     Transfer, Comment pt had max difficulty required extended time and effort. Pt struggled with the sequencing to step and turn to  get to one surface to another.   - spouse assisted with gt and able to instruct pt on correct sequencing  -LN  not able to put enough weight thru UE to take a bir step with RLE due to pain  -     Toileting Assessment/Training    Toileting Assess/Train, Assistive Device bedside commode  -        Toileting Assess/Train, Position sitting;standing  -        Toileting Assess/Train, Indepen Level verbal cues required;supervision required;contact guard assist;minimum assist (75% patient effort);2 person assist required;moderate assist (50% patient effort)  -        Toileting Assess/Train, Comment pt total assist to pull down pants that where under TSLO. Pt required assist to get into standing then set up to pericare in standing. Pt required total assist to get pants off her feet with pt requested no pants in bed.   -        Grooming Assessment/Training    Grooming Assess/Train, Comment pt required set up of lunch, OT did not stay for pt to eat,  was in room.   -        Motor Skills/Interventions    Additional Documentation Balance Skills Training (Group);Fine Motor Coordination Training (Group)  -        Balance Skills Training    Sitting-Level of Assistance Close supervision;Contact guard  -        Standing-Level of Assistance Minimum assistance;x2  -        Fine Motor Coordination Training    Opposition Right:;Left:;intact  -        Orthotics Prosthetics    Additional Documentation    Orthosis Location (Group);Orthosis Management/Training (Group)  -     Orthosis Location    Orthosis Location/Type neck/back  - neck/back  -LN  neck/back  -     Orthosis, Neck/Back TLSO (thoracic lumbar sacral orthosis)  - TLSO (thoracic lumbar sacral orthosis)  -LN  TLSO (thoracic lumbar sacral orthosis)  -     Orthosis Management/Training    Orthosis Indications    rest, reduce pain;mobilize, increase limited range of motion;increase function  -     Orthosis Skills Training    doffing  orthosis;donning orthosis;recognizing skin issues related to orthosis;restrictions/precautions;sleeping without orthosis  -CB     Orthosis Skills Training Comment     will bring T shirt tomorrow to wear under TLSO  -CB     Orthosis Wear Schedule    wear when out of bed only  -CB     Orthosis Skin Assessment    skin is intact, no issues w/ skin integrity  -     Sensory Assessment/Intervention    Light Touch LUE;RUE  -BH  LLE;RLE  -CM LLE;RLE  -CB     LUE Light Touch WNL  -BH        RUE Light Touch WNL  -BH        LLE Light Touch   WNL  -CM WNL  -CB     RLE Light Touch   WNL  -CM WNL  -CB     General Therapy Interventions    Planned Therapy Interventions activity intolerance;adaptive equipment training;ADL retraining;balance training;bed mobility training;energy conservation;fine motor coordination training;motor coordination training;orthotic fitting/training;ROM (Range of Motion);strengthening;transfer training  -        Positioning and Restraints    Pre-Treatment Position sitting in chair/recliner  -   in bed  -CB     Post Treatment Position bed  -BH chair  -LN  chair  -CB     In Bed supine;call light within reach;encouraged to call for assist;exit alarm on;with family/caregiver   TLSO on, educate pt to call RN when want off; HOB up  -        In Chair  notified nsg;sitting;encouraged to call for assist;with family/caregiver  -LN  sitting;call light within reach;encouraged to call for assist;with family/caregiver  -CB       User Key  (r) = Recorded By, (t) = Taken By, (c) = Cosigned By    Initials Name Effective Dates    CB Jennifer Agarwal, PT 04/06/17 -     BH Yakelin Garber, OTR/L 10/17/16 -     LC Ya Bragg RN 10/17/16 -     CM Gabby Hollins RN 10/17/16 -     LN Mary Petit, PTA 10/17/16 -            Occupational Therapy Education     Title: PT OT SLP Therapies (Active)     Topic: Occupational Therapy (Active)     Point: ADL training (Done)    Description: Instruct learner(s) on proper  safety adaptation and remediation techniques during self care or transfers.   Instruct in proper use of assistive devices.    Learning Progress Summary    Learner Readiness Method Response Comment Documented by Status   Patient Acceptance E VU family present part of time. Educated on OT and POC. Educated on log roll, spinal precuations, PWB status, throughout ADL, t/f. Educated on TLSO. Educated need for assist w/ADL. Educated to call for assist.  12/16/17 1331 Done               Point: Precautions (Done)    Description: Instruct learner(s) on prescribed precautions during self-care and functional transfers.    Learning Progress Summary    Learner Readiness Method Response Comment Documented by Status   Patient Acceptance E VU family present part of time. Educated on OT and POC. Educated on log roll, spinal precuations, PWB status, throughout ADL, t/f. Educated on TLSO. Educated need for assist w/ADL. Educated to call for assist.  12/16/17 1331 Done               Point: Body mechanics (Done)    Description: Instruct learner(s) on proper positioning and spine alignment during self-care, functional mobility activities and/or exercises.    Learning Progress Summary    Learner Readiness Method Response Comment Documented by Status   Patient Acceptance E VU family present part of time. Educated on OT and POC. Educated on log roll, spinal precuations, PWB status, throughout ADL, t/f. Educated on TLSO. Educated need for assist w/ADL. Educated to call for assist.  12/16/17 1331 Done                      User Key     Initials Effective Dates Name Provider Type Discipline     10/17/16 -  Yakelin Garber, OTR/L Occupational Therapist OT                  OT Recommendation and Plan  Anticipated Equipment Needs At Discharge: bathing equipment, bedside commode, dressing equipment, gait belt, raised toilet seat, reacher, shower chair  Anticipated Discharge Disposition: home with 24/7 care, home with home health, home with  outpatient services, skilled nursing facility, inpatient rehabilitation facility  Planned Therapy Interventions: activity intolerance, adaptive equipment training, ADL retraining, balance training, bed mobility training, energy conservation, fine motor coordination training, motor coordination training, orthotic fitting/training, ROM (Range of Motion), strengthening, transfer training  Therapy Frequency: other (see comments) (3-14x a week)  Plan of Care Review  Plan Of Care Reviewed With: patient  Outcome Summary/Follow up Plan: OT wesley completed this date, dtr present for part of beginning and  for part of end of session. Educated on log roll, spinal precuations, PWB status and safety with ADL. Pt required min assist of 2 for sit to stand and t/f to BSC/bed. pt slow pace and unable to maintain PWB status. Pt required mod-max assist for going sitting to sidelying to supine. Pt fatigued with tasks. Pt could benefit from some inpatient rehab before d/c home with 24/7 assist secondary to amount of assist pt curently requiring for t/f and ADL. If pt goes home she will need a BSC.           OT Goals       12/16/17 1029          Bed Mobility OT LTG    Bed Mobility OT LTG, Date Established 12/16/17  -      Bed Mobility OT LTG, Time to Achieve by discharge  -      Bed Mobility OT LTG, Activity Type all bed mobility  -      Bed Mobility OT LTG, [place] Level supervision required   to engage in ADL  -      Transfer Training OT LTG    Transfer Training OT LTG, Date Established 12/16/17  -      Transfer Training OT LTG, Time to Achieve by discharge  -      Transfer Training OT LTG, Activity Type toilet  -      Transfer Training OT LTG, [place] Level supervision required  -      Caregiver Training OT LTG    Caregiver Training OT LTG, Date Established 12/16/17  -      Caregiver Training OT LTG, Time to Achieve by discharge  -      Caregiver Training OT LTG, Activity Type with home and ADL  safety and orthotic management  -      Caregiver Training OT LTG, [place] Level able to assist adequately;able to cue patient adequately  -      ADL OT LTG    ADL OT LTG, Date Established 12/16/17  -      ADL OT LTG, Time to Achieve by discharge  -      ADL OT LTG, Activity Type ADL skills  -      ADL OT LTG, Additional Goal set up feeding/grooming/toileting - min A with UB bath/dressing/brace application and LB dressing/bath.  AE/AD as needed.  -      Functional Mobility OT LTG    Functional Mobility Goal OT LTG, Date Established 12/16/17  -      Functional Mobility Goal OT LTG, Time to Achieve by discharge  -      Functional Mobility Goal OT LTG, [place] Level standby assist  -      Functional Mobility Goal OT LTG, Assist Device rolling walker  -      Functional Mobility Goal OT LTG, Distance to Achieve to the sink;to the bathroom   within precuations/weight bear status  -        User Key  (r) = Recorded By, (t) = Taken By, (c) = Cosigned By    Initials Name Provider Type     Yakelin Garber, OTR/L Occupational Therapist                Outcome Measures       12/16/17 1029 12/16/17 0900 12/15/17 1335    How much help from another person do you currently need...    Turning from your back to your side while in flat bed without using bedrails?  3  -LN 2  -CB    Moving from lying on back to sitting on the side of a flat bed without bedrails?  2  -LN 2  -CB    Moving to and from a bed to a chair (including a wheelchair)?  3  -LN 2  -CB    Standing up from a chair using your arms (e.g., wheelchair, bedside chair)?  3  -LN 3  -CB    Climbing 3-5 steps with a railing?  2  -LN 2  -CB    To walk in hospital room?  3  -LN 2  -CB    AM-PAC 6 Clicks Score  16  -LN 13  -CB    How much help from another is currently needed...    Putting on and taking off regular lower body clothing? 1  -      Bathing (including washing, rinsing, and drying) 2  -      Toileting (which includes using toilet bed  pan or urinal) 2  -      Putting on and taking off regular upper body clothing 2  -      Taking care of personal grooming (such as brushing teeth) 3  -      Eating meals 4  -      Score 14  -      Functional Assessment    Outcome Measure Options AM-PAC 6 Clicks Daily Activity (OT)  -BH AM-PAC 6 Clicks Basic Mobility (PT)  -LN AM-PAC 6 Clicks Basic Mobility (PT)  -CB      User Key  (r) = Recorded By, (t) = Taken By, (c) = Cosigned By    Initials Name Provider Type    CB Jennifer Agarwal, PT Physical Therapist     Yakelin Garber OTR/L Occupational Therapist    TANVIR Petit, PTA Physical Therapy Assistant          Time Calculation:   OT Start Time: 1029  OT Stop Time: 1118  OT Time Calculation (min): 49 min    Therapy Charges for Today     Code Description Service Date Service Provider Modifiers Qty    39005483252 HC OT SELFCARE CURRENT 12/16/2017 Yakelin Garber OTR/L GO, CL 1    19065205791 HC OT SELFCARE PROJECTED 12/16/2017 Yakelin Garber OTR/L GO, CJ 1    [id number]  OT SELF CARE/MGMT/TRAIN EA 15 MIN 12/16/2017 Yakelin Garber OTR/L GO 1    [id number]  OT THERAPEUTIC ACT EA 15 MIN 12/16/2017 Yakelin Garber OTR/L GO 1    [id number]  OT EVAL HIGH COMPLEXITY 1 12/16/2017 Yakelin Garber OTR/L GO 1          OT G-codes  OT Professional Judgement Used?: Yes  OT Functional Scales Options: AM-PAC 6 Clicks Daily Activity (OT)  Score: 14  Functional Limitation: Self care  Self Care Current Status (): At least 60 percent but less than 80 percent impaired, limited or restricted  Self Care Goal Status (): At least 20 percent but less than 40 percent impaired, limited or restricted    ROGELIO Cadena/YOSELIN  12/16/2017

## 2017-12-16 NOTE — PLAN OF CARE
Problem: Patient Care Overview (Adult)  Goal: Plan of Care Review    12/16/17 0633   Coping/Psychosocial Response Interventions   Plan Of Care Reviewed With patient   Patient Care Overview   Progress progress toward functional goals as expected   Outcome Evaluation   Outcome Summary/Follow up Plan Pr rested between care, VS stable, pain controlled with meds; [place] catheter d/c.

## 2017-12-16 NOTE — PLAN OF CARE
Problem: Patient Care Overview (Adult)  Goal: Plan of Care Review  Outcome: Ongoing (interventions implemented as appropriate)    12/16/17 0900   Coping/Psychosocial Response Interventions   Plan Of Care Reviewed With patient;spouse   Patient Care Overview   Progress improving   Outcome Evaluation   Outcome Summary/Follow up Plan spouse present and assisted pt with tlso,t/f's and gt,reviewed log roll and checking skin integrety when out of brace;t/f's mod/min of 1-amb 10' with rw and min of 1 but as pt fatigued unable to maintain pwb on l le-if d/c today would benefit from aru prior to d/c home       Goal: Discharge Needs Assessment  Outcome: Ongoing (interventions implemented as appropriate)    12/15/17 1335 12/16/17 1029   Discharge Needs Assessment   Concerns To Be Addressed no discharge needs identified --    Equipment Needed After Discharge walker, rolling  (has rolling walker at home ) --    Discharge Facility/Level Of Care Needs home with home health --    Current Discharge Risk physical impairment --    Discharge Disposition still a patient --    Current Health   Outpatient/Agency/Support Group Needs homecare agency (specify level of care) --    Living Environment   Transportation Available --  car;family or friend will provide   Self-Care   Equipment Currently Used at Home --  none         Problem: Inpatient Physical Therapy  Goal: Bed Mobility Goal STG- PT  Outcome: Ongoing (interventions implemented as appropriate)    12/15/17 1335 12/16/17 0900   Bed Mobility PT STG   Bed Mobility PT STG, Date Established 12/15/17 --    Bed Mobility PT STG, Time to Achieve 2 days --    Bed Mobility PT STG, Activity Type roll left/roll right;sidelying to sit/sit to sidelying --    Bed Mobility PT STG, [place] Level contact guard assist --    Bed Mobility PT STG, Additional Goal log rolling with HOB down and no rails,  may assist --    Bed Mobility PT STG, Date Goal Reviewed --  12/16/17   Bed Mobility PT STG,  Outcome --  goal not met       Goal: Transfer Training Goal 1 STG- PT  Outcome: Ongoing (interventions implemented as appropriate)    12/15/17 1335 12/16/17 0900   Transfer Training PT STG   Transfer Training PT STG, Date Established 12/15/17 --    Transfer Training PT STG, Activity Type bed to chair /chair to bed;sit to stand/stand to sit --    Transfer Training PT STG, [place] Level contact guard assist --    Transfer Training PT STG, Assist Device walker, rolling --    Transfer Training PT STG, Date Goal Reviewed --  12/16/17   Transfer Training PT STG, Outcome --  goal not met       Goal: Gait Training Goal LTG- PT  Outcome: Ongoing (interventions implemented as appropriate)    12/15/17 1335 12/16/17 0900   Gait Training PT LTG   Gait Training Goal PT LTG, Date Established 12/15/17 --    Gait Training Goal PT LTG, Time to Achieve 2 days --    Gait Training Goal PT LTG, [place] Level contact guard assist --    Gait Training Goal PT LTG, Assist Device walker, rolling --    Gait Training Goal PT LTG, Distance to Achieve 50 feet --    Gait Training Goal PT LTG, Date Goal Reviewed --  12/16/17   Gait Training Goal PT LTG, Outcome --  goal not met       Goal: Stair Training Goal LTG- PT  Outcome: Ongoing (interventions implemented as appropriate)    12/15/17 1335 12/16/17 0900   Stair Training PT LTG   Stair Training Goal PT LTG, Date Established 12/15/17 --    Stair Training Goal PT LTG, Time to Achieve 2 days --    Stair Training Goal PT LTG, Number of Steps 2 --    Stair Training Goal PT LTG, [place] Level contact guard assist --    Stair Training Goal PT LTG, Assist Device walker, rolling --    Stair Training Goal PT LTG, Date Goal Reviewed --  12/16/17   Stair Training Goal PT LTG, Outcome --  goal not met       Goal: Strength Goal LTG- PT  Outcome: Ongoing (interventions implemented as appropriate)    12/15/17 1335 12/16/17 0900   Strength Goal PT LTG   Strength Goal PT LTG, Date Established  12/15/17 --    Strength Goal PT LTG, Time to Achieve 2 days --    Strength Goal PT LTG, Measure to Achieve 10 reps all ex BLE actively --    Strength Goal PT LTG, Date Goal Reviewed --  12/16/17   Strength Goal PT LTG, Outcome --  goal not met       Goal: Physical Therapy Goal STG- PT  Outcome: Ongoing (interventions implemented as appropriate)    12/15/17 1335 12/16/17 0900   Physical Therapy PT STG   Physical Therapy PT STG, Date Established 12/15/17 --    Physical Therapy PT STG, Time to Achieve 2 days --    Physical Therapy PT STG, Activity Type be able to don and doff TLSO with assist of  --    Physical Therapy PT STG, [place] Level independent --    Physical Therapy PT STG, Additional Goal do log rolling and protect spine --    Physical Therapy PT STG, Date Goal Reviewed --  12/16/17   Physical Therapy PT STG, Outcome --  goal not met

## 2017-12-16 NOTE — PLAN OF CARE
Problem: Patient Care Overview (Adult)  Goal: Plan of Care Review  Outcome: Ongoing (interventions implemented as appropriate)    12/16/17 1029   Coping/Psychosocial Response Interventions   Plan Of Care Reviewed With patient   Outcome Evaluation   Outcome Summary/Follow up Plan OT wesley completed this date, dtr present for part of beginning and  for part of end of session. Educated on log roll, spinal precuations, PWB status and safety with ADL. Pt required min assist of 2 for sit to stand and t/f to BSC/bed. pt slow pace and unable to maintain PWB status. Pt required mod-max assist for going sitting to sidelying to supine. Pt fatigued with tasks. Pt could benefit from some inpatient rehab before d/c home with 24/7 assist secondary to amount of assist pt curently requiring for t/f and ADL. If pt goes home she will need a BSC.          Problem: Inpatient Occupational Therapy  Goal: Bed Mobility Goal LTG- OT  Outcome: Ongoing (interventions implemented as appropriate)    12/16/17 1029   Bed Mobility OT LTG   Bed Mobility OT LTG, Date Established 12/16/17   Bed Mobility OT LTG, Time to Achieve by discharge   Bed Mobility OT LTG, Activity Type all bed mobility   Bed Mobility OT LTG, [place] Level supervision required  (to engage in ADL)       Goal: Transfer Training Goal 1 LTG- OT  Outcome: Ongoing (interventions implemented as appropriate)    12/16/17 1029   Transfer Training OT LTG   Transfer Training OT LTG, Date Established 12/16/17   Transfer Training OT LTG, Time to Achieve by discharge   Transfer Training OT LTG, Activity Type toilet   Transfer Training OT LTG, [place] Level supervision required       Goal: Caregiver Training Goal LTG- OT  Outcome: Ongoing (interventions implemented as appropriate)    12/16/17 1029   Caregiver Training OT LTG   Caregiver Training OT LTG, Date Established 12/16/17   Caregiver Training OT LTG, Time to Achieve by discharge   Caregiver Training OT LTG, Activity Type  with home and ADL safety and orthotic management   Caregiver Training OT LTG, [place] Level able to assist adequately;able to cue patient adequately       Goal: ADL Goal LTG- OT  Outcome: Ongoing (interventions implemented as appropriate)    12/16/17 1029   ADL OT LTG   ADL OT LTG, Date Established 12/16/17   ADL OT LTG, Time to Achieve by discharge   ADL OT LTG, Activity Type ADL skills   ADL OT LTG, Additional Goal set up feeding/grooming/toileting - min A with UB bath/dressing/brace application and LB dressing/bath. AE/AD as needed.       Goal: Functional Mobility Goal LTG- OT  Outcome: Ongoing (interventions implemented as appropriate)    12/16/17 1029   Functional Mobility OT LTG   Functional Mobility Goal OT LTG, Date Established 12/16/17   Functional Mobility Goal OT LTG, Time to Achieve by discharge   Functional Mobility Goal OT LTG, [place] Level standby assist   Functional Mobility Goal OT LTG, Assist Device rolling walker   Functional Mobility Goal OT LTG, Distance to Achieve to the sink;to the bathroom  (within precuations/weight bear status)

## 2017-12-16 NOTE — PLAN OF CARE
Problem: Patient Care Overview (Adult)  Goal: Plan of Care Review  Outcome: Ongoing (interventions implemented as appropriate)    12/16/17 0771   Coping/Psychosocial Response Interventions   Plan Of Care Reviewed With patient   Patient Care Overview   Progress improving   Outcome Evaluation   Outcome Summary/Follow up Plan pain controlled with meds. Pt getting up to bedside commode with back brace on, and assistance.        Goal: Adult Individualization and Mutuality  Outcome: Ongoing (interventions implemented as appropriate)  Goal: Discharge Needs Assessment  Outcome: Ongoing (interventions implemented as appropriate)    Problem: Orthopaedic Fracture (Adult)  Goal: Signs and Symptoms of Listed Potential Problems Will be Absent or Manageable (Orthopaedic Fracture)  Outcome: Ongoing (interventions implemented as appropriate)    Problem: Fall Risk (Adult)  Goal: Absence of Falls  Outcome: Ongoing (interventions implemented as appropriate)

## 2017-12-16 NOTE — PROGRESS NOTES
[facility] Medicine Services  INPATIENT PROGRESS NOTE    Length of Stay: 2  Date of Admission: 12/13/2017  Primary Care Physician: Troy Cheng MD    Subjective   Chief Complaint: Fall, back pain  HPI:  77 year old female with a history of DM II, HTN, and osteoporosis who suffered a fall and sustained fractures to her left pubic ramus, L2, and T12.  She was evaluated by spine/orthopedics who recommend brace and pain control, no surgical management.  Brace has been fitted and she is working with therapy.  She reports pain is still severe but mobility improved with brace.     Review of Systems   Constitutional: Negative for chills and fever.   Respiratory: Negative for shortness of breath.    Cardiovascular: Negative for chest pain and palpitations.   Gastrointestinal: Positive for constipation. Negative for abdominal pain, diarrhea, nausea and vomiting.   Musculoskeletal: Positive for back pain.      All pertinent negatives and positives are as above. All other systems have been reviewed and are negative unless otherwise stated.     Objective    Temp:  [97 °F (36.1 °C)-98.6 °F (37 °C)] 97.5 °F (36.4 °C)  Heart Rate:  [84-90] 87  Resp:  [18] 18  BP: ()/(51-64) 96/54    Physical Exam   Constitutional: She is oriented to person, place, and time. She appears well-developed and well-nourished.   HENT:   Head: Normocephalic and atraumatic.   Cardiovascular: Normal rate, regular rhythm, normal heart sounds and intact distal pulses.    Pulmonary/Chest: Effort normal and breath sounds normal. No respiratory distress.   Abdominal: Soft. Bowel sounds are normal. She exhibits no distension. There is no tenderness.   Musculoskeletal: Normal range of motion. She exhibits no edema or deformity.   Neurological: She is alert and oriented to person, place, and time. She exhibits normal muscle tone.   Skin: Skin is warm and dry. No erythema.   Vitals reviewed.    Results  Review:  I have reviewed the labs, radiology results, and diagnostic studies.    Laboratory Data:     Results from last 7 days  Lab Units 12/15/17  0541 12/14/17  0546   SODIUM mmol/L 133* 132*   POTASSIUM mmol/L 3.8 3.5   CHLORIDE mmol/L 96 97   CO2 mmol/L 25.0 24.0   BUN mg/dL 20 17   CREATININE mg/dL 0.69 0.60   GLUCOSE mg/dL 147* 181*   CALCIUM mg/dL 8.7 9.0   ANION GAP mmol/L 12.0 11.0     Estimated Creatinine Clearance: 56 mL/min (by C-G formula based on Cr of 0.69).            Results from last 7 days  Lab Units 12/15/17  0541 12/14/17  0546   WBC 10*3/mm3 10.42* 8.42   HEMOGLOBIN g/dL 11.5* 11.0*   HEMATOCRIT % 34.9* 33.4*   PLATELETS 10*3/mm3 172 164           Culture Data:   No results found for: BLOODCX  No results found for: URINECX  No results found for: RESPCX  No results found for: WOUNDCX  No results found for: STOOLCX  No components found for: BODYFLD    Radiology Data:   Imaging Results (last 24 hours)     ** No results found for the last 24 hours. **          I have reviewed the patient current medications.     Assessment/Plan     Hospital Problem List     * (Principal)Closed T12 spinal fracture    Closed fracture of ramus of left pubis    Closed fracture of second lumbar vertebra    HTN (hypertension)    Type 2 diabetes mellitus          Plan:    TLSO  Pain control: PRN norco, PRN morphine  PT/OT  BP control with atenolol, lisinopril, and HCTZ  Glucose control with metformin/glipizide/SSI  Discharge planning after therapy assessments, may need further rehab        This document has been electronically signed by DEANNA Cordova on December 16, 2017 12:35 PM

## 2017-12-16 NOTE — THERAPY TREATMENT NOTE
Acute Care - Physical Therapy Treatment Note  [facility]     Patient Name: Susanna Carr  : 1940  MRN: [id number]  Today's Date: 2017  Onset of Illness/Injury or Date of Surgery Date: 17  Date of Referral to PT: 12/15/17  Referring Physician: Puneet HUERTA    Admit Date: 2017    Visit Dx:    ICD-10-CM ICD-9-CM   1. Pubic ramus fracture, left, closed, initial encounter S32.592A 808.2   2. Other closed fracture of twelfth thoracic vertebra, initial encounter S22.088A 805.2   3. Closed compression fracture of second lumbar vertebra, initial encounter S32.020A 805.4   4. Impaired physical mobility Z74.09 781.99   5. Impaired gait and mobility R26.89 781.2     Patient Active Problem List   Diagnosis   • Closed fracture of ramus of left pubis   • Pathological fracture of vertebra, initial encounter   • Closed T12 spinal fracture   • Closed fracture of second lumbar vertebra   • HTN (hypertension)   • Type 2 diabetes mellitus               Adult Rehabilitation Note       17 0900          Rehab Assessment/Intervention    Discipline physical therapy assistant  -LN      Document Type therapy note (daily note)  -LN      Subjective Information agree to therapy;complains of;weakness;pain  -LN      Precautions/Limitations fall precautions;spinal precautions   tlso on when up pwb l le  -LN      Recorded by [LN] Mary Petit PTA      Vital Signs    Pre Systolic BP Rehab 128  -LN      Pre Treatment Diastolic BP 63  -LN      Post Systolic BP Rehab 121  -LN      Post Treatment Diastolic BP 60  -LN      Pretreatment Heart Rate (beats/min) 102  -LN      Posttreatment Heart Rate (beats/min) 100  -LN      Pre SpO2 (%) 93  -LN      O2 Delivery Pre Treatment room air  -LN      Post SpO2 (%) 91  -LN      Pre Patient Position Supine  -LN      Intra Patient Position Standing  -LN      Post Patient Position Sitting  -LN      Recorded by [LN] Mary Petit PTA      Pain Assessment    Pain  Assessment 0-10  -LN      Pain Score 0  -LN      Post Pain Score 9  -LN      Pain Type Acute pain;Surgical pain  -LN      Pain Location Back  -LN      Pain Orientation Lower   left hip  -LN      Pain Intervention(s) Medication (See MAR)  -LN      Recorded by [LN] Mary Petit, LILLIAN      Cognitive Assessment/Intervention    Orientation Status oriented to;person     -LN      Recorded by [LN] Mary Petit, PTA      Mobility Assessment/Training    Extremity Weight-Bearing Status left lower extremity  -LN      Left Lower Extremity Weight-Bearing partial weight-bearing  -LN      Recorded by [LN] Mary Petit, PTA      Bed Mobility, Assessment/Treatment    Bed Mobility, Assistive Device --   bed flat hr or mattress  -LN      Bed Mobility, Roll Left, [place] minimum assist (75% patient effort)  -LN      Bed Mobility, Roll Right, [place] minimum assist (75% patient effort)  -LN      Bed Mobility, Scoot/Bridge, [place] not tested  -LN      Bed Mob, Supine to Sit, [place] moderate assist (50% patient effort)  -LN      Bed Mob, Sit to Supine, [place] not tested  -LN      Bed Mob, Sidelying to Sit, [place] moderate assist (50% patient effort)  -LN      Bed Mobility, Comment spouse assisted with putting on tlso and with t/f's requiring v.c's  -LN      Recorded by [LN] Mary Petit, LILLIAN      Transfer Assessment/Treatment    Transfers, Bed-Chair [place] minimum assist (75% patient effort)  -LN      Transfers, Chair-Bed [place] not tested  -LN      Transfers, Bed-Chair-Bed, Assist Device rolling walker  -LN      Transfers, Sit-Stand [place] minimum assist (75% patient effort);verbal cues required  -LN      Transfers, Stand-Sit [place] minimum assist (75% patient effort)  -LN      Transfers, Sit-Stand-Sit, Assist Device rolling walker  -LN      Toilet Transfer, [place] not tested  -LN      Transfer, Comment spouse assisted with gt and able to instruct pt on correct sequencing   -LN      Recorded by [LN] Mary Petit PTA      Gait Assessment/Treatment    Gait, [place] Level minimum assist (75% patient effort)  -LN      Gait, Assistive Device rolling walker  -LN      Gait, Distance (Feet) 10  -LN      Gait, Comment as pt fatigued unable to maintain pwb left  -LN      Recorded by [LN] Mary Petit, PTA      Orthosis Location    Orthosis Location/Type neck/back  -LN      Orthosis, Neck/Back TLSO (thoracic lumbar sacral orthosis)  -LN      Recorded by [LN] Mary Petit, PTA      Positioning and Restraints    Post Treatment Position chair  -LN      In Chair notified nsg;sitting;encouraged to call for assist;with family/caregiver  -LN      Recorded by [LN] Mary Petit, PTA        User Key  (r) = Recorded By, (t) = Taken By, (c) = Cosigned By    Initials Name Effective Dates    LN Mary Petit, PTA 10/17/16 -                 IP PT Goals       12/16/17 0900 12/15/17 1335       Bed Mobility PT STG    Bed Mobility PT STG, Date Established  12/15/17  -CB     Bed Mobility PT STG, Time to Achieve  2 days  -CB     Bed Mobility PT STG, Activity Type  roll left/roll right;sidelying to sit/sit to sidelying  -CB     Bed Mobility PT STG, [place] Level  contact guard assist  -CB     Bed Mobility PT STG, Additional Goal  log rolling with HOB down and no rails,  may assist  -CB     Bed Mobility PT STG, Date Goal Reviewed 12/16/17  -LN      Bed Mobility PT STG, Outcome goal not met  -LN      Transfer Training PT STG    Transfer Training PT STG, Date Established  12/15/17  -CB     Transfer Training PT STG, Activity Type  bed to chair /chair to bed;sit to stand/stand to sit  -CB     Transfer Training PT STG, [place] Level  contact guard assist  -CB     Transfer Training PT STG, Assist Device  walker, rolling  -CB     Transfer Training PT STG, Date Goal Reviewed 12/16/17  -LN      Transfer Training PT STG, Outcome goal not met  -LN      Gait Training PT LTG    Gait Training Goal PT LTG, Date  Established  12/15/17  -CB     Gait Training Goal PT LTG, Time to Achieve  2 days  -CB     Gait Training Goal PT LTG, [place] Level  contact guard assist  -CB     Gait Training Goal PT LTG, Assist Device  walker, rolling  -CB     Gait Training Goal PT LTG, Distance to Achieve  50 feet  -CB     Gait Training Goal PT LTG, Date Goal Reviewed 12/16/17  -LN      Gait Training Goal PT LTG, Outcome goal not met  -LN      Stair Training PT LTG    Stair Training Goal PT LTG, Date Established  12/15/17  -CB     Stair Training Goal PT LTG, Time to Achieve  2 days  -CB     Stair Training Goal PT LTG, Number of Steps  2  -CB     Stair Training Goal PT LTG, [place] Level  contact guard assist  -CB     Stair Training Goal PT LTG, Assist Device  walker, rolling  -CB     Stair Training Goal PT LTG, Date Goal Reviewed 12/16/17  -LN      Stair Training Goal PT LTG, Outcome goal not met  -LN      Strength Goal PT LTG    Strength Goal PT LTG, Date Established  12/15/17  -CB     Strength Goal PT LTG, Time to Achieve  2 days  -CB     Strength Goal PT LTG, Measure to Achieve  10 reps all ex BLE actively  -CB     Strength Goal PT LTG, Date Goal Reviewed 12/16/17  -LN      Strength Goal PT LTG, Outcome goal not met  -LN      Physical Therapy PT STG    Physical Therapy PT STG, Date Established  12/15/17  -CB     Physical Therapy PT STG, Time to Achieve  2 days  -CB     Physical Therapy PT STG, Activity Type  be able to don and doff TLSO with assist of   -CB     Physical Therapy PT STG, [place] Level  independent  -CB     Physical Therapy PT STG, Additional Goal  do log rolling and protect spine  -CB     Physical Therapy PT STG, Date Goal Reviewed 12/16/17  -LN      Physical Therapy PT STG, Outcome goal not met  -LN        User Key  (r) = Recorded By, (t) = Taken By, (c) = Cosigned By    Initials Name Provider Type    CB Jennifer Agarwal, PT Physical Therapist    LN Mary Petit, PTA Physical Therapy Assistant           Physical Therapy Education     Title: PT OT SLP Therapies (Active)     Topic: Physical Therapy (Active)     Point: Mobility training (Active)    Learning Progress Summary    Learner Readiness Method Response Comment Documented by Status   Patient Acceptance E NR misael/doff tlso,tlso on when pt up,checking skin integrety LN 12/16/17 1243 Active    Acceptance D NR  CB 12/15/17 1540 Active   Family Acceptance E NR misael/doff tlso,tlso on when pt up,checking skin integrety LN 12/16/17 1243 Active    Acceptance D NR  CB 12/15/17 1540 Active               Point: Home exercise program (Active)    Learning Progress Summary    Learner Readiness Method Response Comment Documented by Status   Patient Acceptance D NR  CB 12/15/17 1540 Active   Family Acceptance D NR  CB 12/15/17 1540 Active               Point: Body mechanics (Active)    Learning Progress Summary    Learner Readiness Method Response Comment Documented by Status   Patient Acceptance E NR misael/doff tlso,tlso on when pt up,checking skin integrety LN 12/16/17 1243 Active    Acceptance D NR  CB 12/15/17 1540 Active   Family Acceptance E NR misael/doff tlso,tlso on when pt up,checking skin integrety LN 12/16/17 1243 Active    Acceptance D NR  CB 12/15/17 1540 Active               Point: Precautions (Active)    Learning Progress Summary    Learner Readiness Method Response Comment Documented by Status   Patient Acceptance E NR misael/doff tlso,tlso on when pt up,checking skin integrety LN 12/16/17 1243 Active    Acceptance D NR  CB 12/15/17 1540 Active   Family Acceptance E NR misael/doff tlso,tlso on when pt up,checking skin integrety LN 12/16/17 1243 Active    Acceptance D NR  CB 12/15/17 1540 Active                      User Key     Initials Effective Dates Name Provider Type Discipline    CB 04/06/17 -  Jennifer Agarwal, PT Physical Therapist PT    LN 10/17/16 -  Mary Petit, PTA Physical Therapy Assistant PT                    PT Recommendation and  Plan  Anticipated Equipment Needs At Discharge:  (none)  Anticipated Discharge Disposition: home with home health  Demonstrates Need for Referral to Another Service: home health care  Planned Therapy Interventions: bed mobility training, gait training, home exercise program, orthotic fitting/training, patient/family education, stair training, strengthening, transfer training  PT Frequency: per priority policy (5-14)  Plan of Care Review  Plan Of Care Reviewed With: patient, spouse  Progress: improving  Outcome Summary/Follow up Plan: spouse present and assisted pt with tlso,t/f's and gt,reviewed log roll and checking skin integrety when out of brace;t/f's mod/min of 1-amb 10' with rw and min of 1 but as pt fatigued unable to maintain pwb on l le-if d/c today would benefit from aru prior to d/c home          Outcome Measures       12/16/17 0900 12/15/17 1335       How much help from another person do you currently need...    Turning from your back to your side while in flat bed without using bedrails? 3  -LN 2  -CB     Moving from lying on back to sitting on the side of a flat bed without bedrails? 2  -LN 2  -CB     Moving to and from a bed to a chair (including a wheelchair)? 3  -LN 2  -CB     Standing up from a chair using your arms (e.g., wheelchair, bedside chair)? 3  -LN 3  -CB     Climbing 3-5 steps with a railing? 2  -LN 2  -CB     To walk in hospital room? 3  -LN 2  -CB     AM-PAC 6 Clicks Score 16  -LN 13  -CB     Functional Assessment    Outcome Measure Options AM-PAC 6 Clicks Basic Mobility (PT)  -LN AM-PAC 6 Clicks Basic Mobility (PT)  -CB       User Key  (r) = Recorded By, (t) = Taken By, (c) = Cosigned By    Initials Name Provider Type    CB Jennifer Agarwal, PT Physical Therapist    LN Mary Petit, PTA Physical Therapy Assistant           Time Calculation:         PT Charges       12/16/17 0900          Time Calculation    Start Time 0900  -LN      Stop Time 1000  -LN      Time Calculation (min) 60 min   -LN      PT Received On 12/16/17  -LN      Time Calculation- PT    Total Timed Code Minutes- PT 60 minute(s)  -LN        User Key  (r) = Recorded By, (t) = Taken By, (c) = Cosigned By    Initials Name Provider Type    TANVIR Petit PTA Physical Therapy Assistant          Therapy Charges for Today     Code Description Service Date Service Provider Modifiers Qty    03279418164 HC GAIT TRAINING EA 15 MIN 12/16/2017 Mary Petit, PTA GP 1    42048089212 HC PT THERAPEUTIC ACT EA 15 MIN 12/16/2017 Mary Petit, PTA GP 1    36492354944 HC PT SELF CARE/MGMT/TRAIN EA 15 MIN 12/16/2017 Mary Petit, LILLIAN GP 2          PT G-Codes  PT Professional Judgement Used?: Yes  Outcome Measure Options: AM-PAC 6 Clicks Basic Mobility (PT)  Score: 13  Functional Limitation: Mobility: Walking and moving around  Mobility: Walking and Moving Around Current Status (): At least 40 percent but less than 60 percent impaired, limited or restricted  Mobility: Walking and Moving Around Goal Status (): At least 20 percent but less than 40 percent impaired, limited or restricted    Mary Petit PTA  12/16/2017

## 2017-12-16 NOTE — PROGRESS NOTES
Patient has obtained brace, which she will wear to mobilize  Up with physical therapy for mobilization  Partial weight bearing of the left hip. Brace for ambulation, mobilization  She will need to return for follow up with orthopaedic surgery in 3 weeks.

## 2017-12-17 LAB
GLUCOSE BLDC GLUCOMTR-MCNC: 102 MG/DL (ref 70–130)
GLUCOSE BLDC GLUCOMTR-MCNC: 133 MG/DL (ref 70–130)
GLUCOSE BLDC GLUCOMTR-MCNC: 182 MG/DL (ref 70–130)
GLUCOSE BLDC GLUCOMTR-MCNC: 330 MG/DL (ref 70–130)

## 2017-12-17 PROCEDURE — 82962 GLUCOSE BLOOD TEST: CPT

## 2017-12-17 PROCEDURE — 97535 SELF CARE MNGMENT TRAINING: CPT

## 2017-12-17 PROCEDURE — G0378 HOSPITAL OBSERVATION PER HR: HCPCS

## 2017-12-17 PROCEDURE — 63710000001 INSULIN ASPART PER 5 UNITS: Performed by: NURSE PRACTITIONER

## 2017-12-17 PROCEDURE — 97530 THERAPEUTIC ACTIVITIES: CPT

## 2017-12-17 PROCEDURE — 97116 GAIT TRAINING THERAPY: CPT

## 2017-12-17 RX ADMIN — METFORMIN HYDROCHLORIDE 1000 MG: 500 TABLET ORAL at 17:31

## 2017-12-17 RX ADMIN — METHOCARBAMOL 750 MG: 750 TABLET ORAL at 03:59

## 2017-12-17 RX ADMIN — GLIPIZIDE 5 MG: 5 TABLET ORAL at 09:25

## 2017-12-17 RX ADMIN — ASPIRIN 81 MG 81 MG: 81 TABLET ORAL at 09:25

## 2017-12-17 RX ADMIN — ATORVASTATIN CALCIUM 20 MG: 20 TABLET, FILM COATED ORAL at 09:25

## 2017-12-17 RX ADMIN — DOCUSATE SODIUM 100 MG: 100 CAPSULE, LIQUID FILLED ORAL at 17:31

## 2017-12-17 RX ADMIN — DOCUSATE SODIUM 100 MG: 100 CAPSULE, LIQUID FILLED ORAL at 09:25

## 2017-12-17 RX ADMIN — PANTOPRAZOLE SODIUM 40 MG: 40 TABLET, DELAYED RELEASE ORAL at 06:28

## 2017-12-17 RX ADMIN — INSULIN ASPART 2 UNITS: 100 INJECTION, SOLUTION INTRAVENOUS; SUBCUTANEOUS at 09:25

## 2017-12-17 RX ADMIN — INSULIN ASPART 7 UNITS: 100 INJECTION, SOLUTION INTRAVENOUS; SUBCUTANEOUS at 11:30

## 2017-12-17 RX ADMIN — MAGNESIUM HYDROXIDE 10 ML: 2400 SUSPENSION ORAL at 09:25

## 2017-12-17 RX ADMIN — LISINOPRIL 2.5 MG: 2.5 TABLET ORAL at 09:25

## 2017-12-17 RX ADMIN — Medication 12.5 MG: at 09:25

## 2017-12-17 RX ADMIN — GLIPIZIDE 5 MG: 5 TABLET ORAL at 17:31

## 2017-12-17 RX ADMIN — METHOCARBAMOL 750 MG: 750 TABLET ORAL at 11:30

## 2017-12-17 RX ADMIN — HYDROCODONE BITARTRATE AND ACETAMINOPHEN 1 TABLET: 5; 325 TABLET ORAL at 19:27

## 2017-12-17 RX ADMIN — METFORMIN HYDROCHLORIDE 1000 MG: 500 TABLET ORAL at 09:25

## 2017-12-17 RX ADMIN — HYDROCHLOROTHIAZIDE 12.5 MG: 12.5 CAPSULE ORAL at 09:25

## 2017-12-17 RX ADMIN — METHOCARBAMOL 750 MG: 750 TABLET ORAL at 19:27

## 2017-12-17 NOTE — THERAPY TREATMENT NOTE
Acute Care - Physical Therapy Treatment Note  [facility]     Patient Name: Susanna Carr  : 1940  MRN: [id number]  Today's Date: 2017  Onset of Illness/Injury or Date of Surgery Date: 17  Date of Referral to PT: 12/15/17  Referring Physician: Puneet HUERTA    Admit Date: 2017    Visit Dx:    ICD-10-CM ICD-9-CM   1. Pubic ramus fracture, left, closed, initial encounter S32.592A 808.2   2. Other closed fracture of twelfth thoracic vertebra, initial encounter S22.088A 805.2   3. Closed compression fracture of second lumbar vertebra, initial encounter S32.020A 805.4   4. Impaired physical mobility Z74.09 781.99   5. Impaired gait and mobility R26.89 781.2   6. Impaired mobility and ADLs Z74.09 799.89     Patient Active Problem List   Diagnosis   • Closed fracture of ramus of left pubis   • Pathological fracture of vertebra, initial encounter   • Closed T12 spinal fracture   • Closed fracture of second lumbar vertebra   • HTN (hypertension)   • Type 2 diabetes mellitus               Adult Rehabilitation Note       17 1010 17 0840 17 0900    Rehab Assessment/Intervention    Discipline physical therapy assistant  -TW occupational therapy assistant  -RC physical therapy assistant  -LN    Document Type therapy note (daily note)  -TW  therapy note (daily note)  -LN    Subjective Information agree to therapy  -TW  agree to therapy;complains of;weakness;pain  -LN    Patient Effort, Rehab Treatment good  -TW      Symptoms Noted During/After Treatment fatigue  -TW      Precautions/Limitations fall precautions;spinal precautions   Pt not able to maintain PWB through L LE for entire tx.  -TW  fall precautions;spinal precautions   tlso on when up pwb l le  -LN    Recorded by [TW] Sergio Jimenez PTA [RC] SHIRLENE López/YOSELIN [LN] Mary Petit PTA    Vital Signs    Pre Systolic BP Rehab   128  -LN    Pre Treatment Diastolic BP   63  -LN    Post Systolic BP Rehab   129  -  -LN    Post Treatment Diastolic BP  66  -RC 60  -LN    Pretreatment Heart Rate (beats/min) 97  -TW  102  -LN    Posttreatment Heart Rate (beats/min) 98  -TW 98  -  -LN    Pre SpO2 (%) 94  -TW  93  -LN    O2 Delivery Pre Treatment room air  -TW  room air  -LN    Intra SpO2 (%) 98  -TW      Post SpO2 (%) 95  -TW 93  -RC 91  -LN    O2 Delivery Post Treatment  room air  -RC     Pre Patient Position Supine  -TW  Supine  -LN    Intra Patient Position Standing  -TW  Standing  -LN    Post Patient Position Supine  -TW  Sitting  -LN    Recorded by [TW] Sergio Jimenez PTA [RC] SHIRLENE López/YOSELIN [LN] Mary Petit PTA    Pain Assessment    Pain Assessment 0-10  -TW  0-10  -LN    Pain Score 3  -TW  0  -LN    Post Pain Score 3  -TW  9  -LN    Pain Type Acute pain;Surgical pain  -TW  Acute pain;Surgical pain  -LN    Pain Location Back  -TW  Back  -LN    Pain Orientation Lower  -TW  Lower   left hip  -LN    Pain Intervention(s)   Medication (See MAR)  -LN    Recorded by [TW] Sergio Jimenez PTA  [LN] Mary Petit PTA    Cognitive Assessment/Intervention    Current Cognitive/Communication Assessment functional  -TW      Orientation Status oriented x 4  -TW  oriented to;person     -LN    Follows Commands/Answers Questions 100% of the time  -TW      Personal Safety Interventions fall prevention program maintained;gait belt;nonskid shoes/slippers when out of bed  -TW      Recorded by [TW] Sergio Jimenez PTA  [LN] Mary Petit PTA    Mobility Assessment/Training    Extremity Weight-Bearing Status left lower extremity  -TW  left lower extremity  -LN    Left Lower Extremity Weight-Bearing partial weight-bearing  -TW  partial weight-bearing  -LN    Recorded by [TW] Sergio Jimenez PTA  [LN] Mary Petit PTA    Bed Mobility, Assessment/Treatment    Bed Mobility, Assistive Device   --   bed flat hr or mattress  -LN    Bed Mobility, Roll Left, [place] minimum assist (75% patient effort)   -TW  minimum assist (75% patient effort)  -LN    Bed Mobility, Roll Right, [place] minimum assist (75% patient effort)  -TW  minimum assist (75% patient effort)  -LN    Bed Mobility, Scoot/Bridge, [place]   not tested  -LN    Bed Mob, Supine to Sit, [place] moderate assist (50% patient effort)  -TW  moderate assist (50% patient effort)  -LN    Bed Mob, Sit to Supine, [place] moderate assist (50% patient effort)  -TW  not tested  -LN    Bed Mob, Sidelying to Sit, [place] moderate assist (50% patient effort)  -TW  moderate assist (50% patient effort)  -LN    Bed Mobility, Comment Spouse assisted PTA to misael/doff TLSO  -TW  spouse assisted with putting on tlso and with t/f's requiring v.c's  -LN    Recorded by [TW] Sergio Jimenez PTA  [LN] Mary Petit PTA    Transfer Assessment/Treatment    Transfers, Bed-Chair [place]   minimum assist (75% patient effort)  -LN    Transfers, Chair-Bed [place]   not tested  -LN    Transfers, Bed-Chair-Bed, Assist Device   rolling walker  -LN    Transfers, Sit-Stand [place] minimum assist (75% patient effort)  -TW  minimum assist (75% patient effort);verbal cues required  -LN    Transfers, Stand-Sit [place] minimum assist (75% patient effort)  -TW  minimum assist (75% patient effort)  -LN    Transfers, Sit-Stand-Sit, Assist Device rolling walker  -TW  rolling walker  -LN    Toilet Transfer, [place] minimum assist (75% patient effort)  -TW  not tested  -LN    Toilet Transfer, Assistive Device rolling walker  -TW      Transfer, Comment   spouse assisted with gt and able to instruct pt on correct sequencing  -LN    Recorded by [TW] Sergio Jimenez PTA  [LN] Mary Petit PTA    Gait Assessment/Treatment    Gait, [place] Level minimum assist (75% patient effort)  -TW  minimum assist (75% patient effort)  -LN    Gait, Assistive Device rolling walker  -TW  rolling walker  -LN    Gait, Distance (Feet) 8   x2 trials to BSC and  back to bed after completing duties.  -TW  10  -LN    Gait, Comment   as pt fatigued unable to maintain pwb left  -LN    Recorded by [TW] Sergio Jimenez PTA  [LN] Mary Petit PTA    Orthosis Location    Orthosis Location/Type   neck/back  -LN    Orthosis, Neck/Back   TLSO (thoracic lumbar sacral orthosis)  -LN    Recorded by   [LN] Mary Petit PTA    Positioning and Restraints    Pre-Treatment Position in bed  -TW      Post Treatment Position bed  -TW  chair  -LN    In Bed supine;call light within reach;encouraged to call for assist;with family/caregiver  -TW      In Chair   notified nsg;sitting;encouraged to call for assist;with family/caregiver  -LN    Recorded by [TW] Sergio iJmenez PTA  [LN] Mary Petit PTA      User Key  (r) = Recorded By, (t) = Taken By, (c) = Cosigned By    Initials Name Effective Dates    LN Mary Petit, LILLIAN 10/17/16 -     TW Sergio Jimenez PTA 10/17/16 -     RC JARED López 10/17/16 -                 IP PT Goals       12/17/17 1010 12/16/17 0900 12/15/17 1335    Bed Mobility PT STG    Bed Mobility PT STG, Date Established   12/15/17  -CB    Bed Mobility PT STG, Time to Achieve   2 days  -CB    Bed Mobility PT STG, Activity Type   roll left/roll right;sidelying to sit/sit to sidelying  -CB    Bed Mobility PT STG, [place] Level   contact guard assist  -CB    Bed Mobility PT STG, Additional Goal   log rolling with HOB down and no rails,  may assist  -CB    Bed Mobility PT STG, Date Goal Reviewed 12/17/17  -TW 12/16/17  -LN     Bed Mobility PT STG, Outcome goal ongoing  -TW goal not met  -LN     Transfer Training PT STG    Transfer Training PT STG, Date Established   12/15/17  -CB    Transfer Training PT STG, Activity Type   bed to chair /chair to bed;sit to stand/stand to sit  -CB    Transfer Training PT STG, [place] Level   contact guard assist  -CB    Transfer Training PT STG, Assist Device   walker, rolling  -CB    Transfer Training PT STG,  Date Goal Reviewed 12/17/17  -TW 12/16/17  -LN     Transfer Training PT STG, Outcome goal ongoing  -TW goal not met  -LN     Gait Training PT LTG    Gait Training Goal PT LTG, Date Established   12/15/17  -CB    Gait Training Goal PT LTG, Time to Achieve   2 days  -CB    Gait Training Goal PT LTG, [place] Level   contact guard assist  -CB    Gait Training Goal PT LTG, Assist Device   walker, rolling  -CB    Gait Training Goal PT LTG, Distance to Achieve   50 feet  -CB    Gait Training Goal PT LTG, Date Goal Reviewed 12/17/17  -TW 12/16/17  -LN     Gait Training Goal PT LTG, Outcome goal ongoing  -TW goal not met  -LN     Stair Training PT LTG    Stair Training Goal PT LTG, Date Established   12/15/17  -CB    Stair Training Goal PT LTG, Time to Achieve   2 days  -CB    Stair Training Goal PT LTG, Number of Steps   2  -CB    Stair Training Goal PT LTG, [place] Level   contact guard assist  -CB    Stair Training Goal PT LTG, Assist Device   walker, rolling  -CB    Stair Training Goal PT LTG, Date Goal Reviewed 12/17/17  -TW 12/16/17  -LN     Stair Training Goal PT LTG, Outcome goal ongoing  -TW goal not met  -LN     Strength Goal PT LTG    Strength Goal PT LTG, Date Established   12/15/17  -CB    Strength Goal PT LTG, Time to Achieve   2 days  -CB    Strength Goal PT LTG, Measure to Achieve   10 reps all ex BLE actively  -CB    Strength Goal PT LTG, Date Goal Reviewed  12/16/17  -LN     Strength Goal PT LTG, Outcome goal ongoing  -TW goal not met  -LN     Physical Therapy PT STG    Physical Therapy PT STG, Date Established   12/15/17  -CB    Physical Therapy PT STG, Time to Achieve   2 days  -CB    Physical Therapy PT STG, Activity Type   be able to don and doff TLSO with assist of   -CB    Physical Therapy PT STG, [place] Level   independent  -CB    Physical Therapy PT STG, Additional Goal   do log rolling and protect spine  -CB    Physical Therapy PT STG, Date Goal Reviewed 12/17/17  -TW  12/16/17  -LN     Physical Therapy PT STG, Outcome goal ongoing  -TW goal not met  -LN       User Key  (r) = Recorded By, (t) = Taken By, (c) = Cosigned By    Initials Name Provider Type    CB Jennifer Agarwal, PT Physical Therapist    LN Mary Petit, PTA Physical Therapy Assistant    TW Sergio Jimenez, PTA Physical Therapy Assistant          Physical Therapy Education     Title: PT OT SLP Therapies (Active)     Topic: Physical Therapy (Active)     Point: Mobility training (Active)    Learning Progress Summary    Learner Readiness Method Response Comment Documented by Status   Patient Acceptance E NR misael/doff tlso,tlso on when pt up,checking skin integrety LN 12/16/17 1243 Active    Acceptance D NR  CB 12/15/17 1540 Active   Family Acceptance E NR misael/doff tlso,tlso on when pt up,checking skin integrety LN 12/16/17 1243 Active    Acceptance D NR  CB 12/15/17 1540 Active               Point: Home exercise program (Active)    Learning Progress Summary    Learner Readiness Method Response Comment Documented by Status   Patient Acceptance D NR  CB 12/15/17 1540 Active   Family Acceptance D NR  CB 12/15/17 1540 Active               Point: Body mechanics (Active)    Learning Progress Summary    Learner Readiness Method Response Comment Documented by Status   Patient Acceptance E NR misael/doff tlso,tlso on when pt up,checking skin integrety LN 12/16/17 1243 Active    Acceptance D NR  CB 12/15/17 1540 Active   Family Acceptance E NR misael/doff tlso,tlso on when pt up,checking skin integrety LN 12/16/17 1243 Active    Acceptance D NR  CB 12/15/17 1540 Active               Point: Precautions (Active)    Learning Progress Summary    Learner Readiness Method Response Comment Documented by Status   Patient Acceptance E NR misael/doff tlso,tlso on when pt up,checking skin integrety LN 12/16/17 1243 Active    Acceptance D NR  CB 12/15/17 1540 Active   Family Acceptance E NR misael/doff tlso,tlso on when pt up,checking skin  integrety LN 12/16/17 1243 Active    Acceptance D NR  CB 12/15/17 1540 Active                      User Key     Initials Effective Dates Name Provider Type Discipline    CB 04/06/17 -  Jennifer Agarwal, PT Physical Therapist PT    LN 10/17/16 -  Mary Petit PTA Physical Therapy Assistant PT                    PT Recommendation and Plan  Anticipated Equipment Needs At Discharge:  (none)  Anticipated Discharge Disposition: home with home health  Demonstrates Need for Referral to Another Service: home health care  Planned Therapy Interventions: bed mobility training, gait training, home exercise program, orthotic fitting/training, patient/family education, stair training, strengthening, transfer training  PT Frequency: per priority policy (5-14)  Plan of Care Review  Plan Of Care Reviewed With: patient  Progress: improving  Outcome Summary/Follow up Plan: Pt and  cont to require lots of assistance to misael/doff TLSO but pt able to show improvment with t/f's and gait. Tends to apply more weight throuhg L LE than PWB as ordered.          Outcome Measures       12/17/17 1010 12/16/17 1029 12/16/17 0900    How much help from another person do you currently need...    Turning from your back to your side while in flat bed without using bedrails? 3  -TW  3  -LN    Moving from lying on back to sitting on the side of a flat bed without bedrails? 2  -TW  2  -LN    Moving to and from a bed to a chair (including a wheelchair)? 3  -TW  3  -LN    Standing up from a chair using your arms (e.g., wheelchair, bedside chair)? 3  -TW  3  -LN    Climbing 3-5 steps with a railing? 2  -TW  2  -LN    To walk in hospital room? 3  -TW  3  -LN    AM-PAC 6 Clicks Score 16  -TW  16  -LN    How much help from another is currently needed...    Putting on and taking off regular lower body clothing?  1  -BH     Bathing (including washing, rinsing, and drying)  2  -BH     Toileting (which includes using toilet bed pan or urinal)  2  -BH      Putting on and taking off regular upper body clothing  2  -     Taking care of personal grooming (such as brushing teeth)  3  -     Eating meals  4  -     Score  14  -     Functional Assessment    Outcome Measure Options AM-PAC 6 Clicks Basic Mobility (PT)  -TW AM-PAC 6 Clicks Daily Activity (OT)  - AM-PAC 6 Clicks Basic Mobility (PT)  -LN      12/15/17 1335          How much help from another person do you currently need...    Turning from your back to your side while in flat bed without using bedrails? 2  -CB      Moving from lying on back to sitting on the side of a flat bed without bedrails? 2  -CB      Moving to and from a bed to a chair (including a wheelchair)? 2  -CB      Standing up from a chair using your arms (e.g., wheelchair, bedside chair)? 3  -CB      Climbing 3-5 steps with a railing? 2  -CB      To walk in hospital room? 2  -CB      AM-PAC 6 Clicks Score 13  -CB      Functional Assessment    Outcome Measure Options AM-PAC 6 Clicks Basic Mobility (PT)  -CB        User Key  (r) = Recorded By, (t) = Taken By, (c) = Cosigned By    Initials Name Provider Type    CB Jennifer Agarwal, PT Physical Therapist     Yakelin Garber, OTR/L Occupational Therapist    LN Mary Petit, PTA Physical Therapy Assistant    JESSICA Jimenez PTA Physical Therapy Assistant           Time Calculation:         PT Charges       12/17/17 1228          Time Calculation    Start Time 1010  -TW      Stop Time 1106  -TW      Time Calculation (min) 56 min  -TW      PT Received On 12/17/17  -TW      PT Goal Re-Cert Due Date 12/28/17  -TW      Time Calculation- PT    Total Timed Code Minutes- PT 56 minute(s)  -TW        User Key  (r) = Recorded By, (t) = Taken By, (c) = Cosigned By    Initials Name Provider Type    JESSICA Jimenez, LILLIAN Physical Therapy Assistant          Therapy Charges for Today     Code Description Service Date Service Provider Modifiers Qty    73232950513 HC PT THER SUPP EA 15 MIN 12/17/2017  Sergio Jimenez PTA GP 1    76191149949 HC GAIT TRAINING EA 15 MIN 12/17/2017 Sergio Jimenez, PTA GP 1    94520348084 HC PT THERAPEUTIC ACT EA 15 MIN 12/17/2017 Sergio Jimenez PTA GP 3          PT G-Codes  PT Professional Judgement Used?: Yes  Outcome Measure Options: AM-PAC 6 Clicks Basic Mobility (PT)  Score: 13  Functional Limitation: Mobility: Walking and moving around  Mobility: Walking and Moving Around Current Status (): At least 40 percent but less than 60 percent impaired, limited or restricted  Mobility: Walking and Moving Around Goal Status (): At least 20 percent but less than 40 percent impaired, limited or restricted    Sergio Jimenez PTA  12/17/2017

## 2017-12-17 NOTE — PLAN OF CARE
Problem: Patient Care Overview (Adult)  Goal: Plan of Care Review  Outcome: Ongoing (interventions implemented as appropriate)    12/17/17 0516   Coping/Psychosocial Response Interventions   Plan Of Care Reviewed With patient   Outcome Evaluation   Outcome Summary/Follow up Plan Pt rested between care, VS stable, pain controlled

## 2017-12-17 NOTE — THERAPY TREATMENT NOTE
Inpatient Rehabilitation - Occupational Therapy Treatment Note  [facility]     Patient Name: Susanna Carr  : 1940  MRN: [id number]  Today's Date: 2017  Onset of Illness/Injury or Date of Surgery Date: 17  Date of Referral to OT: 12/15/17  Referring Physician: Puneet HUERTA      Admit Date: 2017    Visit Dx:     ICD-10-CM ICD-9-CM   1. Pubic ramus fracture, left, closed, initial encounter S32.592A 808.2   2. Other closed fracture of twelfth thoracic vertebra, initial encounter S22.088A 805.2   3. Closed compression fracture of second lumbar vertebra, initial encounter S32.020A 805.4   4. Impaired physical mobility Z74.09 781.99   5. Impaired gait and mobility R26.89 781.2   6. Impaired mobility and ADLs Z74.09 799.89     Patient Active Problem List   Diagnosis   • Closed fracture of ramus of left pubis   • Pathological fracture of vertebra, initial encounter   • Closed T12 spinal fracture   • Closed fracture of second lumbar vertebra   • HTN (hypertension)   • Type 2 diabetes mellitus             Adult Rehabilitation Note       17 1010 17 0840 17 0900    Rehab Assessment/Intervention    Discipline physical therapy assistant  -TW occupational therapy assistant  -RC physical therapy assistant  -LN    Document Type therapy note (daily note)  -TW therapy note (daily note)  -RC therapy note (daily note)  -LN    Subjective Information agree to therapy  -TW agree to therapy  -RC agree to therapy;complains of;weakness;pain  -LN    Patient Effort, Rehab Treatment good  -TW good  -RC     Symptoms Noted During/After Treatment fatigue  -TW      Precautions/Limitations fall precautions;spinal precautions   Pt not able to maintain PWB through L LE for entire tx.  -TW non-weight bearing status;fall precautions;spinal precautions;brace on when up  -RC fall precautions;spinal precautions   tlso on when up pwb l le  -LN    Recorded by [TW] Sergio Jimenez PTA [RC] Melanie  SHIRLENE Morgan/YOSELIN [LN] Mray Petit PTA    Vital Signs    Pre Systolic BP Rehab   128  -LN    Pre Treatment Diastolic BP   63  -LN    Post Systolic BP Rehab  129  -  -LN    Post Treatment Diastolic BP  66  -RC 60  -LN    Pretreatment Heart Rate (beats/min) 97  -TW  102  -LN    Posttreatment Heart Rate (beats/min) 98  -TW 98  -  -LN    Pre SpO2 (%) 94  -TW  93  -LN    O2 Delivery Pre Treatment room air  -TW  room air  -LN    Intra SpO2 (%) 98  -TW      Post SpO2 (%) 95  -TW 93  -RC 91  -LN    O2 Delivery Post Treatment  room air  -RC     Pre Patient Position Supine  -TW  Supine  -LN    Intra Patient Position Standing  -TW  Standing  -LN    Post Patient Position Supine  -TW  Sitting  -LN    Recorded by [TW] Sergio Jimenez PTA [RC] SHIRLENE López/YOSELIN [LN] Mary Petit PTA    Pain Assessment    Pain Assessment 0-10  -TW  0-10  -LN    Pain Score 3  -TW 3  -RC 0  -LN    Post Pain Score 3  -TW 3  -RC 9  -LN    Pain Type Acute pain;Surgical pain  -TW Acute pain  -RC Acute pain;Surgical pain  -LN    Pain Location Back  -TW Back  -RC Back  -LN    Pain Orientation Lower  -TW Lower  -RC Lower   left hip  -LN    Pain Intervention(s)  Medication (See MAR);Repositioned  -RC Medication (See MAR)  -LN    Recorded by [TW] Sergio Jimenez PTA [RC] SHIRLENE López/L [LN] Mary Petit PTA    Cognitive Assessment/Intervention    Current Cognitive/Communication Assessment functional  -TW functional  -RC     Orientation Status oriented x 4  -TW oriented x 4  -RC oriented to;person     -LN    Follows Commands/Answers Questions 100% of the time  -TW      Personal Safety Interventions fall prevention program maintained;gait belt;nonskid shoes/slippers when out of bed  -TW      Recorded by [TW] Sergio Jimenez PTA [RC] SHIRLENE López/L [LN] Mary Petit PTA    Mobility Assessment/Training    Extremity Weight-Bearing Status left lower extremity  -TW  left lower extremity  -LN    Left Lower  Extremity Weight-Bearing partial weight-bearing  -TW  partial weight-bearing  -LN    Recorded by [TW] Sergio Jimenez PTA  [LN] Mary Petit PTA    Bed Mobility, Assessment/Treatment    Bed Mobility, Assistive Device   --   bed flat hr or mattress  -LN    Bed Mobility, Roll Left, [place] minimum assist (75% patient effort)  -TW  minimum assist (75% patient effort)  -LN    Bed Mobility, Roll Right, [place] minimum assist (75% patient effort)  -TW  minimum assist (75% patient effort)  -LN    Bed Mobility, Scoot/Bridge, [place]   not tested  -LN    Bed Mob, Supine to Sit, [place] moderate assist (50% patient effort)  -TW  moderate assist (50% patient effort)  -LN    Bed Mob, Sit to Supine, [place] moderate assist (50% patient effort)  -TW maximum assist (25% patient effort)  -RC not tested  -LN    Bed Mob, Sidelying to Sit, [place] moderate assist (50% patient effort)  -TW  moderate assist (50% patient effort)  -LN    Bed Mobility, Comment Spouse assisted PTA to misael/doff TLSO  -TW  spouse assisted with putting on tlso and with t/f's requiring v.c's  -LN    Recorded by [TW] Sergio Jimenez PTA [RC] SHIRLENE López/YOSELIN [LN] Mary Petit PTA    Transfer Assessment/Treatment    Transfers, Bed-Chair [place]   minimum assist (75% patient effort)  -LN    Transfers, Chair-Bed [place]  minimum assist (75% patient effort);2 person assist required  -RC not tested  -LN    Transfers, Bed-Chair-Bed, Assist Device  rolling walker  -RC rolling walker  -LN    Transfers, Sit-Stand [place] minimum assist (75% patient effort)  -TW minimum assist (75% patient effort)  -RC minimum assist (75% patient effort);verbal cues required  -LN    Transfers, Stand-Sit [place] minimum assist (75% patient effort)  -TW minimum assist (75% patient effort);2 person assist required  -RC minimum assist (75% patient effort)  -LN    Transfers, Sit-Stand-Sit, Assist Device rolling walker  -TW  rolling walker  -RC rolling walker  -LN    Toilet Transfer, [place] minimum assist (75% patient effort)  -TW minimum assist (75% patient effort);2 person assist required  -RC not tested  -LN    Toilet Transfer, Assistive Device rolling walker  -TW rolling walker  -RC     Transfer, Comment   spouse assisted with gt and able to instruct pt on correct sequencing  -LN    Recorded by [TW] Sergio Jimenez PTA [RC] JARED López [LN] Mary Petit PTA    Gait Assessment/Treatment    Gait, [place] Level minimum assist (75% patient effort)  -TW  minimum assist (75% patient effort)  -LN    Gait, Assistive Device rolling walker  -TW  rolling walker  -LN    Gait, Distance (Feet) 8   x2 trials to BSC and back to bed after completing duties.  -TW  10  -LN    Gait, Comment   as pt fatigued unable to maintain pwb left  -LN    Recorded by [TW] Sergio Jimenez PTA  [LN] Mary Petit PTA    Functional Mobility    Functional Mobility- Ind. Level  minimum assist (75% patient effort);2 person assist required  -RC     Functional Mobility- Device  rolling walker  -RC     Functional Mobility-Distance (Feet)  1  -RC     Functional Mobility-Maintain WBing  unable to maintain weight bearing status  -RC     Recorded by  [RC] SHIRLENE López/L     ADL Assessment/Intervention    Additional Documentation  --   declined bath  -RC     Recorded by  [RC] SHIRLENE López/L     Toileting Assessment/Training    Toileting Assess/Train, Assistive Device  bedside commode  -RC     Toileting Assess/Train, Position  sitting;standing  -RC     Toileting Assess/Train, Indepen Level  minimum assist (75% patient effort)  -RC     Recorded by  [RC] SHIRLENE López/L     Grooming Assessment/Training    Grooming Assess/Train, Position  sitting  -RC     Grooming Assess/Train, Indepen Level  minimum assist (75% patient effort)  -RC     Grooming Assess/Train, Impairments  ROM decreased  -RC     Recorded by  [RC] Melanie VAIL  SHIRLENE Amanda/L     Orthosis Location    Orthosis Location/Type   neck/back  -LN    Orthosis, Neck/Back   TLSO (thoracic lumbar sacral orthosis)  -LN    Recorded by   [LN] Mary Petit PTA    Positioning and Restraints    Pre-Treatment Position in bed  -TW sitting in chair/recliner  -RC     Post Treatment Position bed  -TW bed  -RC chair  -LN    In Bed supine;call light within reach;encouraged to call for assist;with family/caregiver  -TW supine;call light within reach;encouraged to call for assist;with family/caregiver  -RC     In Chair   notified nsg;sitting;encouraged to call for assist;with family/caregiver  -LN    Recorded by [TW] Sergio Jimenez PTA [RC] SHIRLENE López/YOSELIN [LN] Mary Petit PTA      User Key  (r) = Recorded By, (t) = Taken By, (c) = Cosigned By    Initials Name Effective Dates    LN Mary Petit, LILLIAN 10/17/16 -     TW Sergio Jimenez PTA 10/17/16 -     RC SHIVA LópezA/YOSELIN 10/17/16 -                 OT Goals       12/17/17 1307 12/16/17 1029       Bed Mobility OT LTG    Bed Mobility OT LTG, Date Established  12/16/17  -     Bed Mobility OT LTG, Time to Achieve  by discharge  -     Bed Mobility OT LTG, Activity Type  all bed mobility  -     Bed Mobility OT LTG, [place] Level  supervision required   to engage in ADL  -     Bed Mobility OT LTG, Date Goal Reviewed 12/17/17  -      Bed Mobility OT LTG, Outcome goal ongoing  -      Transfer Training OT LTG    Transfer Training OT LTG, Date Established  12/16/17  -     Transfer Training OT LTG, Time to Achieve  by discharge  -     Transfer Training OT LTG, Activity Type  toilet  -     Transfer Training OT LTG, [place] Level  supervision required  -     Transfer Training OT LTG, Date Goal Reviewed 12/17/17  -      Transfer Training OT LTG, Outcome goal ongoing  -      Caregiver Training OT LTG    Caregiver Training OT LTG, Date Established  12/16/17  -     Caregiver Training OT LTG, Time to  Achieve  by discharge  -     Caregiver Training OT LTG, Activity Type  with home and ADL safety and orthotic management  -     Caregiver Training OT LTG, [place] Level  able to assist adequately;able to cue patient adequately  -     Caregiver Training OT LTG, Date Goal Reviewed 12/17/17  -      Caregiver Training OT LTG, Outcome goal ongoing  [facility]      ADL OT LTG    ADL OT LTG, Date Established  12/16/17  -     ADL OT LTG, Time to Achieve  by discharge  -     ADL OT LTG, Activity Type  ADL skills  -     ADL OT LTG, Additional Goal  set up feeding/grooming/toileting - min A with UB bath/dressing/brace application and LB dressing/bath.  AE/AD as needed.  -     ADL OT LTG, Date Goal Reviewed 12/17/17  -      ADL OT LTG, Outcome goal ongoing  [facility]      Functional Mobility OT LTG    Functional Mobility Goal OT LTG, Date Established  12/16/17  -     Functional Mobility Goal OT LTG, Time to Achieve  by discharge  -     Functional Mobility Goal OT LTG, [place] Level  standby assist  -     Functional Mobility Goal OT LTG, Assist Device  rolling walker  -     Functional Mobility Goal OT LTG, Distance to Achieve  to the sink;to the bathroom   within precuations/weight bear status  -     Functional Mobility Goal OT LTG, Date Goal Reviewed 12/17/17  -      Functional Mobility Goal OT LTG, Outcome goal ongoing  [facility]        User Key  (r) = Recorded By, (t) = Taken By, (c) = Cosigned By    Initials Name Provider Type     ROGELIO Cadena/L Occupational Therapist    SHIRLENE Nolasco/L Occupational Therapy Assistant          Occupational Therapy Education     Title: PT OT SLP Therapies (Active)     Topic: Occupational Therapy (Active)     Point: ADL training (Active)    Description: Instruct learner(s) on proper safety adaptation and remediation techniques during self care or transfers.   Instruct in proper use of assistive devices.    Learning Progress Summary    Learner Readiness  Method Response Comment Documented by Status   Patient Acceptance E NR   12/17/17 1307 Active    Acceptance E VU family present part of time. Educated on OT and POC. Educated on log roll, spinal precuations, PWB status, throughout ADL, t/f. Educated on TLSO. Educated need for assist w/ADL. Educated to call for assist.  12/16/17 1331 Done               Point: Precautions (Active)    Description: Instruct learner(s) on prescribed precautions during self-care and functional transfers.    Learning Progress Summary    Learner Readiness Method Response Comment Documented by Status   Patient Acceptance E NR   12/17/17 1307 Active    Acceptance E VU family present part of time. Educated on OT and POC. Educated on log roll, spinal precuations, PWB status, throughout ADL, t/f. Educated on TLSO. Educated need for assist w/ADL. Educated to call for assist.  12/16/17 1331 Done               Point: Body mechanics (Active)    Description: Instruct learner(s) on proper positioning and spine alignment during self-care, functional mobility activities and/or exercises.    Learning Progress Summary    Learner Readiness Method Response Comment Documented by Status   Patient Acceptance E NR   12/17/17 1307 Active    Acceptance E VU family present part of time. Educated on OT and POC. Educated on log roll, spinal precuations, PWB status, throughout ADL, t/f. Educated on TLSO. Educated need for assist w/ADL. Educated to call for assist.  12/16/17 1331 Done                      User Key     Initials Effective Dates Name Provider Type Discipline     10/17/16 -  ROGELIO Cadena/L Occupational Therapist OT     10/17/16 -  SHIRLENE López/L Occupational Therapy Assistant OT                  OT Recommendation and Plan  Anticipated Equipment Needs At Discharge: bathing equipment, bedside commode, dressing equipment, gait belt, raised toilet seat, reacher, shower chair  Anticipated Discharge Disposition: home with 24/7  care, home with home health, home with outpatient services, skilled nursing facility, inpatient rehabilitation facility  Planned Therapy Interventions: activity intolerance, adaptive equipment training, ADL retraining, balance training, bed mobility training, energy conservation, fine motor coordination training, motor coordination training, orthotic fitting/training, ROM (Range of Motion), strengthening, transfer training  Therapy Frequency: other (see comments) (3-14x a week)  Plan of Care Review  Plan Of Care Reviewed With: patient  Progress: progress toward functional goals is gradual  Outcome Summary/Follow up Plan: recommend  ARU at d/c prior to going home.        Outcome Measures       12/17/17 1010 12/17/17 0840 12/16/17 1029    How much help from another person do you currently need...    Turning from your back to your side while in flat bed without using bedrails? 3  -TW      Moving from lying on back to sitting on the side of a flat bed without bedrails? 2  -TW      Moving to and from a bed to a chair (including a wheelchair)? 3  -TW      Standing up from a chair using your arms (e.g., wheelchair, bedside chair)? 3  -TW      Climbing 3-5 steps with a railing? 2  -TW      To walk in hospital room? 3  -TW      AM-PAC 6 Clicks Score 16  -TW      How much help from another is currently needed...    Putting on and taking off regular lower body clothing?  1  -RC 1  -BH    Bathing (including washing, rinsing, and drying)  2  -RC 2  -BH    Toileting (which includes using toilet bed pan or urinal)  2  -RC 2  -BH    Putting on and taking off regular upper body clothing  2  -RC 2  -BH    Taking care of personal grooming (such as brushing teeth)  3  -RC 3  -BH    Eating meals  4  -RC 4  -BH    Score  14  -RC 14  -BH    Functional Assessment    Outcome Measure Options AM-PAC 6 Clicks Basic Mobility (PT)  -TW  AM-PAC 6 Clicks Daily Activity (OT)  -BH      12/16/17 0900 12/15/17 1335       How much help from another  person do you currently need...    Turning from your back to your side while in flat bed without using bedrails? 3  -LN 2  -CB     Moving from lying on back to sitting on the side of a flat bed without bedrails? 2  -LN 2  -CB     Moving to and from a bed to a chair (including a wheelchair)? 3  -LN 2  -CB     Standing up from a chair using your arms (e.g., wheelchair, bedside chair)? 3  -LN 3  -CB     Climbing 3-5 steps with a railing? 2  -LN 2  -CB     To walk in hospital room? 3  -LN 2  -CB     AM-PAC 6 Clicks Score 16  -LN 13  -CB     Functional Assessment    Outcome Measure Options AM-PAC 6 Clicks Basic Mobility (PT)  -LN AM-PAC 6 Clicks Basic Mobility (PT)  -CB       User Key  (r) = Recorded By, (t) = Taken By, (c) = Cosigned By    Initials Name Provider Type    CB Jennifer Agarwal, PT Physical Therapist     Yakelin Garber, OTR/L Occupational Therapist    LN Mary Petit, PTA Physical Therapy Assistant     Sergio Jimenez, PTA Physical Therapy Assistant     Melanie Amanda GARBER/L Occupational Therapy Assistant           Time Calculation:         Time Calculation- OT       12/17/17 1310          Time Calculation- OT    OT Start Time 0840  -      OT Stop Time 0923  -      OT Time Calculation (min) 43 min  -      Total Timed Code Minutes- OT 43 minute(s)  -      OT Received On 12/17/17  -        User Key  (r) = Recorded By, (t) = Taken By, (c) = Cosigned By    Initials Name Provider Type     Melanie Amanda GARBER/L Occupational Therapy Assistant           Therapy Charges for Today     Code Description Service Date Service Provider Modifiers Qty    52281610248  OT SELF CARE/MGMT/TRAIN EA 15 MIN 12/17/2017 Melanie Amanda, GARBER/L GO 1    16759971911 HC OT THERAPEUTIC ACT EA 15 MIN 12/17/2017 Melanie Amanda GARBER/L GO 2          OT G-codes  OT Professional Judgement Used?: Yes  OT Functional Scales Options: AM-PAC 6 Clicks Daily Activity (OT)  Score: 14  Functional Limitation: Self care  Self  Care Current Status (): At least 60 percent but less than 80 percent impaired, limited or restricted  Self Care Goal Status (): At least 20 percent but less than 40 percent impaired, limited or restricted    SHIRLENE López/YOSELIN  12/17/2017

## 2017-12-17 NOTE — PLAN OF CARE
Problem: Patient Care Overview (Adult)  Goal: Plan of Care Review  Outcome: Ongoing (interventions implemented as appropriate)    12/17/17 1010   Coping/Psychosocial Response Interventions   Plan Of Care Reviewed With patient   Patient Care Overview   Progress improving   Outcome Evaluation   Outcome Summary/Follow up Plan Pt and  cont to require lots of assistance to misael/doff TLSO but pt able to show improvment with t/f's and gait. Tends to apply more weight throuhg L LE than PWB as ordered.       Goal: Discharge Needs Assessment  Outcome: Ongoing (interventions implemented as appropriate)    12/15/17 1335 12/16/17 1029   Discharge Needs Assessment   Concerns To Be Addressed no discharge needs identified --    Equipment Needed After Discharge walker, rolling  (has rolling walker at home ) --    Discharge Facility/Level Of Care Needs home with home health --    Current Discharge Risk physical impairment --    Discharge Disposition still a patient --    Current Health   Outpatient/Agency/Support Group Needs homecare agency (specify level of care) --    Living Environment   Transportation Available --  car;family or friend will provide   Self-Care   Equipment Currently Used at Home --  none  (RW brought in that was pt; pt reported none to OT)         Problem: Inpatient Physical Therapy  Goal: Bed Mobility Goal STG- PT  Outcome: Ongoing (interventions implemented as appropriate)    12/15/17 1335 12/17/17 1010   Bed Mobility PT STG   Bed Mobility PT STG, Date Established 12/15/17 --    Bed Mobility PT STG, Time to Achieve 2 days --    Bed Mobility PT STG, Activity Type roll left/roll right;sidelying to sit/sit to sidelying --    Bed Mobility PT STG, [place] Level contact guard assist --    Bed Mobility PT STG, Additional Goal log rolling with HOB down and no rails,  may assist --    Bed Mobility PT STG, Date Goal Reviewed --  12/17/17   Bed Mobility PT STG, Outcome --  goal ongoing       Goal:  Transfer Training Goal 1 STG- PT  Outcome: Ongoing (interventions implemented as appropriate)    12/15/17 1335 12/17/17 1010   Transfer Training PT STG   Transfer Training PT STG, Date Established 12/15/17 --    Transfer Training PT STG, Activity Type bed to chair /chair to bed;sit to stand/stand to sit --    Transfer Training PT STG, [place] Level contact guard assist --    Transfer Training PT STG, Assist Device walker, rolling --    Transfer Training PT STG, Date Goal Reviewed --  12/17/17   Transfer Training PT STG, Outcome --  goal ongoing       Goal: Gait Training Goal LTG- PT  Outcome: Ongoing (interventions implemented as appropriate)    12/15/17 1335 12/17/17 1010   Gait Training PT LTG   Gait Training Goal PT LTG, Date Established 12/15/17 --    Gait Training Goal PT LTG, Time to Achieve 2 days --    Gait Training Goal PT LTG, [place] Level contact guard assist --    Gait Training Goal PT LTG, Assist Device walker, rolling --    Gait Training Goal PT LTG, Distance to Achieve 50 feet --    Gait Training Goal PT LTG, Date Goal Reviewed --  12/17/17   Gait Training Goal PT LTG, Outcome --  goal ongoing       Goal: Stair Training Goal LTG- PT  Outcome: Ongoing (interventions implemented as appropriate)    12/15/17 1335 12/17/17 1010   Stair Training PT LTG   Stair Training Goal PT LTG, Date Established 12/15/17 --    Stair Training Goal PT LTG, Time to Achieve 2 days --    Stair Training Goal PT LTG, Number of Steps 2 --    Stair Training Goal PT LTG, [place] Level contact guard assist --    Stair Training Goal PT LTG, Assist Device walker, rolling --    Stair Training Goal PT LTG, Date Goal Reviewed --  12/17/17   Stair Training Goal PT LTG, Outcome --  goal ongoing       Goal: Strength Goal LTG- PT  Outcome: Ongoing (interventions implemented as appropriate)    12/15/17 1335 12/16/17 0900 12/17/17 1010   Strength Goal PT LTG   Strength Goal PT LTG, Date Established 12/15/17 --  --    Strength  Goal PT LTG, Time to Achieve 2 days --  --    Strength Goal PT LTG, Measure to Achieve 10 reps all ex BLE actively --  --    Strength Goal PT LTG, Date Goal Reviewed --  12/16/17 --    Strength Goal PT LTG, Outcome --  --  goal ongoing       Goal: Physical Therapy Goal STG- PT  Outcome: Ongoing (interventions implemented as appropriate)    12/15/17 1335 12/17/17 1010   Physical Therapy PT STG   Physical Therapy PT STG, Date Established 12/15/17 --    Physical Therapy PT STG, Time to Achieve 2 days --    Physical Therapy PT STG, Activity Type be able to don and doff TLSO with assist of  --    Physical Therapy PT STG, [place] Level independent --    Physical Therapy PT STG, Additional Goal do log rolling and protect spine --    Physical Therapy PT STG, Date Goal Reviewed --  12/17/17   Physical Therapy PT STG, Outcome --  goal ongoing

## 2017-12-17 NOTE — PLAN OF CARE
Problem: Patient Care Overview (Adult)  Goal: Plan of Care Review  Outcome: Ongoing (interventions implemented as appropriate)    12/17/17 1307   Coping/Psychosocial Response Interventions   Plan Of Care Reviewed With patient   Patient Care Overview   Progress progress toward functional goals is gradual   Outcome Evaluation   Outcome Summary/Follow up Plan recommend ARU at d/c prior to going home.         Problem: Inpatient Occupational Therapy  Goal: Bed Mobility Goal LTG- OT  Outcome: Ongoing (interventions implemented as appropriate)    12/16/17 1029 12/17/17 1307   Bed Mobility OT LTG   Bed Mobility OT LTG, Date Established 12/16/17 --    Bed Mobility OT LTG, Time to Achieve by discharge --    Bed Mobility OT LTG, Activity Type all bed mobility --    Bed Mobility OT LTG, [place] Level supervision required  (to engage in ADL) --    Bed Mobility OT LTG, Date Goal Reviewed --  12/17/17   Bed Mobility OT LTG, Outcome --  goal ongoing       Goal: Transfer Training Goal 1 LTG- OT  Outcome: Ongoing (interventions implemented as appropriate)    12/16/17 1029 12/17/17 1307   Transfer Training OT LTG   Transfer Training OT LTG, Date Established 12/16/17 --    Transfer Training OT LTG, Time to Achieve by discharge --    Transfer Training OT LTG, Activity Type toilet --    Transfer Training OT LTG, [place] Level supervision required --    Transfer Training OT LTG, Date Goal Reviewed --  12/17/17   Transfer Training OT LTG, Outcome --  goal ongoing       Goal: Caregiver Training Goal LTG- OT  Outcome: Ongoing (interventions implemented as appropriate)    12/16/17 1029 12/17/17 1307   Caregiver Training OT LTG   Caregiver Training OT LTG, Date Established 12/16/17 --    Caregiver Training OT LTG, Time to Achieve by discharge --    Caregiver Training OT LTG, Activity Type with home and ADL safety and orthotic management --    Caregiver Training OT LTG, [place] Level able to assist adequately;able to cue patient  adequately --    Caregiver Training OT LTG, Date Goal Reviewed --  12/17/17   Caregiver Training OT LTG, Outcome --  goal ongoing       Goal: ADL Goal LTG- OT  Outcome: Ongoing (interventions implemented as appropriate)    12/16/17 1029 12/17/17 1307   ADL OT LTG   ADL OT LTG, Date Established 12/16/17 --    ADL OT LTG, Time to Achieve by discharge --    ADL OT LTG, Activity Type ADL skills --    ADL OT LTG, Additional Goal set up feeding/grooming/toileting - min A with UB bath/dressing/brace application and LB dressing/bath. AE/AD as needed. --    ADL OT LTG, Date Goal Reviewed --  12/17/17   ADL OT LTG, Outcome --  goal ongoing       Goal: Functional Mobility Goal LTG- OT  Outcome: Ongoing (interventions implemented as appropriate)    12/16/17 1029 12/17/17 1307   Functional Mobility OT LTG   Functional Mobility Goal OT LTG, Date Established 12/16/17 --    Functional Mobility Goal OT LTG, Time to Achieve by discharge --    Functional Mobility Goal OT LTG, Center Line Level standby assist --    Functional Mobility Goal OT LTG, Assist Device rolling walker --    Functional Mobility Goal OT LTG, Distance to Achieve to the sink;to the bathroom  (within precuations/weight bear status) --    Functional Mobility Goal OT LTG, Date Goal Reviewed --  12/17/17   Functional Mobility Goal OT LTG, Outcome --  goal ongoing

## 2017-12-17 NOTE — PROGRESS NOTES
[facility] Medicine Services  INPATIENT PROGRESS NOTE    Length of Stay: 2  Date of Admission: 12/13/2017  Primary Care Physician: Troy Cheng MD    Subjective   Chief Complaint: Fall, back pain  HPI:  77 year old female with a history of DM II, HTN, and osteoporosis who suffered a fall and sustained fractures to her left pubic ramus, L2, and T12.  She was evaluated by spine/orthopedics who recommend brace and pain control, no surgical management.  Brace has been fitted and she is working with therapy.  She reports pain is still severe but mobility improved with brace. Family and patient express safety concerns with returning home currently.      Review of Systems   Constitutional: Negative for chills and fever.   Respiratory: Negative for shortness of breath.    Cardiovascular: Negative for chest pain and palpitations.   Gastrointestinal: Negative for abdominal pain, diarrhea, nausea and vomiting.   Musculoskeletal: Positive for arthralgias and back pain.      All pertinent negatives and positives are as above. All other systems have been reviewed and are negative unless otherwise stated.     Objective    Temp:  [97.5 °F (36.4 °C)-98.9 °F (37.2 °C)] 98.9 °F (37.2 °C)  Heart Rate:  [83-94] 85  Resp:  [18] 18  BP: ()/(50-58) 121/58    Physical Exam   Constitutional: She is oriented to person, place, and time. She appears well-developed and well-nourished.   HENT:   Head: Normocephalic and atraumatic.   Cardiovascular: Normal rate, regular rhythm, normal heart sounds and intact distal pulses.    Pulmonary/Chest: Effort normal and breath sounds normal. No respiratory distress.   Abdominal: Soft. Bowel sounds are normal. She exhibits no distension. There is no tenderness.   Musculoskeletal: Normal range of motion. She exhibits no edema or deformity.   Neurological: She is alert and oriented to person, place, and time. She exhibits normal muscle tone.   Skin:  Skin is warm and dry. No erythema.   Vitals reviewed.    Results Review:  I have reviewed the labs, radiology results, and diagnostic studies.    Laboratory Data:     Results from last 7 days  Lab Units 12/15/17  0541 12/14/17  0546   SODIUM mmol/L 133* 132*   POTASSIUM mmol/L 3.8 3.5   CHLORIDE mmol/L 96 97   CO2 mmol/L 25.0 24.0   BUN mg/dL 20 17   CREATININE mg/dL 0.69 0.60   GLUCOSE mg/dL 147* 181*   CALCIUM mg/dL 8.7 9.0   ANION GAP mmol/L 12.0 11.0     Estimated Creatinine Clearance: 56 mL/min (by C-G formula based on Cr of 0.69).            Results from last 7 days  Lab Units 12/15/17  0541 12/14/17  0546   WBC 10*3/mm3 10.42* 8.42   HEMOGLOBIN g/dL 11.5* 11.0*   HEMATOCRIT % 34.9* 33.4*   PLATELETS 10*3/mm3 172 164           Culture Data:   No results found for: BLOODCX  No results found for: URINECX  No results found for: RESPCX  No results found for: WOUNDCX  No results found for: STOOLCX  No components found for: BODYFLD    Radiology Data:   Imaging Results (last 24 hours)     ** No results found for the last 24 hours. **          I have reviewed the patient current medications.     Assessment/Plan     Hospital Problem List     * (Principal)Closed T12 spinal fracture    Closed fracture of ramus of left pubis    Closed fracture of second lumbar vertebra    HTN (hypertension)    Type 2 diabetes mellitus          Plan:    TLSO  Pain control: PRN norco, PRN morphine  PT/OT  BP control with atenolol, lisinopril, and HCTZ  Glucose control with metformin/glipizide/SSI  Discharge planning, needs further rehab, consider ARU, will discuss with case management        This document has been electronically signed by DEANNA Cordova on December 17, 2017 10:30 AM

## 2017-12-18 LAB
GLUCOSE BLDC GLUCOMTR-MCNC: 164 MG/DL (ref 70–130)
GLUCOSE BLDC GLUCOMTR-MCNC: 211 MG/DL (ref 70–130)
GLUCOSE BLDC GLUCOMTR-MCNC: 99 MG/DL (ref 70–130)

## 2017-12-18 PROCEDURE — 82962 GLUCOSE BLOOD TEST: CPT

## 2017-12-18 PROCEDURE — G0378 HOSPITAL OBSERVATION PER HR: HCPCS

## 2017-12-18 PROCEDURE — 97116 GAIT TRAINING THERAPY: CPT

## 2017-12-18 PROCEDURE — 97110 THERAPEUTIC EXERCISES: CPT

## 2017-12-18 PROCEDURE — 63710000001 INSULIN ASPART PER 5 UNITS: Performed by: NURSE PRACTITIONER

## 2017-12-18 PROCEDURE — 97530 THERAPEUTIC ACTIVITIES: CPT

## 2017-12-18 PROCEDURE — 97535 SELF CARE MNGMENT TRAINING: CPT

## 2017-12-18 RX ORDER — METHOCARBAMOL 750 MG/1
750 TABLET, FILM COATED ORAL EVERY 8 HOURS PRN
Qty: 30 TABLET | Refills: 0 | Status: SHIPPED | OUTPATIENT
Start: 2017-12-18 | End: 2020-09-16

## 2017-12-18 RX ORDER — HYDROCODONE BITARTRATE AND ACETAMINOPHEN 5; 325 MG/1; MG/1
1 TABLET ORAL EVERY 4 HOURS PRN
Qty: 18 TABLET | Refills: 0 | Status: SHIPPED | OUTPATIENT
Start: 2017-12-18 | End: 2020-09-29

## 2017-12-18 RX ADMIN — METFORMIN HYDROCHLORIDE 1000 MG: 500 TABLET ORAL at 17:21

## 2017-12-18 RX ADMIN — GLIPIZIDE 5 MG: 5 TABLET ORAL at 08:42

## 2017-12-18 RX ADMIN — INSULIN ASPART 2 UNITS: 100 INJECTION, SOLUTION INTRAVENOUS; SUBCUTANEOUS at 08:43

## 2017-12-18 RX ADMIN — HYDROCODONE BITARTRATE AND ACETAMINOPHEN 1 TABLET: 5; 325 TABLET ORAL at 04:24

## 2017-12-18 RX ADMIN — HYDROCODONE BITARTRATE AND ACETAMINOPHEN 1 TABLET: 5; 325 TABLET ORAL at 13:58

## 2017-12-18 RX ADMIN — INSULIN ASPART 4 UNITS: 100 INJECTION, SOLUTION INTRAVENOUS; SUBCUTANEOUS at 12:28

## 2017-12-18 RX ADMIN — DOCUSATE SODIUM 100 MG: 100 CAPSULE, LIQUID FILLED ORAL at 08:42

## 2017-12-18 RX ADMIN — HYDROCODONE BITARTRATE AND ACETAMINOPHEN 1 TABLET: 5; 325 TABLET ORAL at 08:42

## 2017-12-18 RX ADMIN — METFORMIN HYDROCHLORIDE 1000 MG: 500 TABLET ORAL at 08:42

## 2017-12-18 RX ADMIN — GLIPIZIDE 5 MG: 5 TABLET ORAL at 17:22

## 2017-12-18 RX ADMIN — HYDROCHLOROTHIAZIDE 12.5 MG: 12.5 CAPSULE ORAL at 08:42

## 2017-12-18 RX ADMIN — LISINOPRIL 2.5 MG: 2.5 TABLET ORAL at 08:42

## 2017-12-18 RX ADMIN — DOCUSATE SODIUM 100 MG: 100 CAPSULE, LIQUID FILLED ORAL at 17:21

## 2017-12-18 RX ADMIN — ASPIRIN 81 MG 81 MG: 81 TABLET ORAL at 08:42

## 2017-12-18 RX ADMIN — Medication 12.5 MG: at 08:42

## 2017-12-18 RX ADMIN — ATORVASTATIN CALCIUM 20 MG: 20 TABLET, FILM COATED ORAL at 08:42

## 2017-12-18 RX ADMIN — HYDROCODONE BITARTRATE AND ACETAMINOPHEN 1 TABLET: 5; 325 TABLET ORAL at 22:14

## 2017-12-18 RX ADMIN — PANTOPRAZOLE SODIUM 40 MG: 40 TABLET, DELAYED RELEASE ORAL at 06:22

## 2017-12-18 NOTE — NURSING NOTE
ARU consult received. Patient has decided to go home with home health. Will complete ARU consult at this time.

## 2017-12-18 NOTE — THERAPY TREATMENT NOTE
Acute Care - Occupational Therapy Treatment Note  [facility]     Patient Name: Susanna Carr  : 1940  MRN: [id number]  Today's Date: 2017  Onset of Illness/Injury or Date of Surgery Date: 17  Date of Referral to OT: 12/15/17  Referring Physician: Puneet HUERTA      Admit Date: 2017    Visit Dx:     ICD-10-CM ICD-9-CM   1. Pubic ramus fracture, left, closed, initial encounter S32.592A 808.2   2. Other closed fracture of twelfth thoracic vertebra, initial encounter S22.088A 805.2   3. Closed compression fracture of second lumbar vertebra, initial encounter S32.020A 805.4   4. Impaired physical mobility Z74.09 781.99   5. Impaired gait and mobility R26.89 781.2   6. Impaired mobility and ADLs Z74.09 799.89     Patient Active Problem List   Diagnosis   • Closed fracture of ramus of left pubis   • Pathological fracture of vertebra, initial encounter   • Closed T12 spinal fracture   • Closed fracture of second lumbar vertebra   • HTN (hypertension)   • Type 2 diabetes mellitus             Adult Rehabilitation Note       17 0956 17 1010 17 0840    Rehab Assessment/Intervention    Discipline occupational therapy assistant  -DARLENE,PG,CS2 physical therapy assistant  -TW occupational therapy assistant  -RC    Document Type therapy note (daily note)  -DARLENE,PG,CS2 therapy note (daily note)  -TW therapy note (daily note)  -RC    Subjective Information agree to therapy;complains of;pain  -DARLENE,PG,CS2 agree to therapy  -TW agree to therapy  -RC    Patient Effort, Rehab Treatment good  -DARLENE,PG,CS2 good  -TW good  -RC    Symptoms Noted During/After Treatment  fatigue  -TW     Precautions/Limitations fall precautions;spinal precautions  -DARLENE,PG,CS2 fall precautions;spinal precautions   Pt not able to maintain PWB through L LE for entire tx.  -TW non-weight bearing status;fall precautions;spinal precautions;brace on when up  -RC    Recorded by [DARLENE,PG,CS2] SHIRLENE Sumner/YOSELIN (r)  Sharonda Méndez, OT Student (t) SHIRLENE Sumner/YOSELIN (c) [TW] Sergio Jimenez PTA [RC] SHIRLENE López/L    Vital Signs    Pre Systolic BP Rehab 137  -CS,PG,CS2      Pre Treatment Diastolic BP 69  -CS,PG,CS2      Post Systolic BP Rehab 124  -CS,PG,CS2  129  -RC    Post Treatment Diastolic BP 76  -CS,PG,CS2  66  -RC    Pretreatment Heart Rate (beats/min) 94  -CS,PG,CS2 97  -TW     Posttreatment Heart Rate (beats/min) 93  -CS,PG,CS2 98  -TW 98  -RC    Pre SpO2 (%) 92  -CS,PG,CS2 94  -TW     O2 Delivery Pre Treatment room air  -CS,PG,CS2 room air  -TW     Intra SpO2 (%)  98  -TW     Post SpO2 (%) 92  -CS,PG,CS2 95  -TW 93  -RC    O2 Delivery Post Treatment room air  -CS,PG,CS2  room air  -RC    Pre Patient Position Sitting  -CS,PG,CS2 Supine  -TW     Intra Patient Position Sitting  -CS,PG,CS2 Standing  -TW     Post Patient Position Sitting  -CS,PG,CS2 Supine  -TW     Recorded by [CS,PG,CS2] SHIRLENE Sumner/YOSELIN (r) Sharonda Méndez, OT Student (t) SHIRLENE Sumner/YOSELIN (c) [TW] Sergio Jimenez PTA [RC] SHIRLENE López/YOSELIN    Pain Assessment    Pain Assessment 0-10  -CS,PG,CS2 0-10  -TW     Pain Score 3  -CS,PG,CS2 3  -TW 3  -RC    Post Pain Score 3  -CS,PG,CS2 3  -TW 3  -RC    Pain Type Acute pain;Surgical pain  -CS,PG,CS2 Acute pain;Surgical pain  -TW Acute pain  -RC    Pain Location Back  -CS,PG,CS2 Back  -TW Back  -RC    Pain Orientation Lower  -CS,PG,CS2 Lower  -TW Lower  -RC    Pain Intervention(s)   Medication (See MAR);Repositioned  -RC    Recorded by [CS,PG,CS2] SHIRLENE Sumner/YOSELIN (r) Sharonda Méndez, OT Student (t) JARED Sumner (c) [TW] Sergio Jimenez, PTA [RC] SHIRLENE López/L    Vision Assessment/Intervention    Visual Impairment WFL with corrective lenses  -CS,PG,CS2      Recorded by [CS,PG,CS2] SHIRLENE Sumner/YOSELIN (r) Sharonda Méndez, OT Student (t) JARED Sumner (c)      Cognitive Assessment/Intervention    Current  Cognitive/Communication Assessment functional  -CS,PG,CS2 functional  -TW functional  -RC    Orientation Status oriented x 4  -CS,PG,CS2 oriented x 4  -TW oriented x 4  -RC    Follows Commands/Answers Questions 100% of the time  -CS,PG,CS2 100% of the time  -TW     Personal Safety WNL/WFL  -CS,PG,CS2      Personal Safety Interventions fall prevention program maintained  -CS,PG,CS2 fall prevention program maintained;gait belt;nonskid shoes/slippers when out of bed  -TW     Recorded by [CS,PG,CS2] SHIVA SumnerA/YOSELIN (r) Sharonda Méndez, OT Student (t) SHIRLENE Sumner/YOSELIN (c) [TW] Sergio Jimenez PTA [RC] SHIRLENE López/L    Mobility Assessment/Training    Extremity Weight-Bearing Status left lower extremity  -CS,PG,CS2 left lower extremity  -TW     Left Lower Extremity Weight-Bearing partial weight-bearing  -CS,PG,CS2 partial weight-bearing  -TW     Recorded by [CS,PG,CS2] SHIRLENE Sumner/YOSELIN (r) Sharonda Méndez, OT Student (t) SHIRLENE Sumner/YOSELIN (c) [TW] Sergio Jimenez PTA     Bed Mobility, Assessment/Treatment    Bed Mobility, Roll Left, [place]  minimum assist (75% patient effort)  -TW     Bed Mobility, Roll Right, [place]  minimum assist (75% patient effort)  -TW     Bed Mob, Supine to Sit, [place]  moderate assist (50% patient effort)  -TW     Bed Mob, Sit to Supine, [place]  moderate assist (50% patient effort)  -TW maximum assist (25% patient effort)  -RC    Bed Mob, Sidelying to Sit, [place]  moderate assist (50% patient effort)  -TW     Bed Mobility, Comment  Spouse assisted PTA to misael/doff TLSO  -TW     Recorded by  [TW] Sergio Jimenez PTA [RC] SHIRLENE López/L    Transfer Assessment/Treatment    Transfers, Chair-Bed [place]   minimum assist (75% patient effort);2 person assist required  -RC    Transfers, Bed-Chair-Bed, Assist Device   rolling walker  -RC    Transfers, Sit-Stand [place]  minimum assist (75% patient  effort)  -TW minimum assist (75% patient effort)  -RC    Transfers, Stand-Sit [place]  minimum assist (75% patient effort)  -TW minimum assist (75% patient effort);2 person assist required  -RC    Transfers, Sit-Stand-Sit, Assist Device  rolling walker  -TW rolling walker  -RC    Toilet Transfer, [place]  minimum assist (75% patient effort)  -TW minimum assist (75% patient effort);2 person assist required  -RC    Toilet Transfer, Assistive Device  rolling walker  -TW rolling walker  -RC    Recorded by  [TW] Sergio Jimenez PTA [RC] JARED López    Gait Assessment/Treatment    Gait, [place] Level  minimum assist (75% patient effort)  -TW     Gait, Assistive Device  rolling walker  -TW     Gait, Distance (Feet)  8   x2 trials to [facility] and back to bed after completing duties.  -TW     Recorded by  [TW] Sergio Jimenez PTA     Functional Mobility    Functional Mobility- Ind. Level   minimum assist (75% patient effort);2 person assist required  -RC    Functional Mobility- Device   rolling walker  -RC    Functional Mobility-Distance (Feet)   1  -RC    Functional Mobility-Maintain WBing   unable to maintain weight bearing status  -RC    Recorded by   [RC] JARED López    ADL Assessment/Intervention    Additional Documentation   --   declined bath  -RC    Recorded by   [RC] JARED López    Lower Body Dressing Assessment/Training    LB Dressing Assess/Train, Clothing Type doffing:;donning:;slipper socks  -CS      LB Dressing Assess/Train, Assist Device dressing stick;reacher;sock-aid  -CS      LB Dressing Assess/Train, Position sitting  -CS      LB Dressing Assess/Train, [place] set up required;supervision required  -CS      Recorded by [CS] JARED Sumner      Toileting Assessment/Training    Toileting Assess/Train, Assistive Device   bedside commode  -RC    Toileting Assess/Train, Position   sitting;standing  -RC    Toileting Assess/Train, Indepen Level    minimum assist (75% patient effort)  -RC    Recorded by   [RC] SHIRLENE López/L    Grooming Assessment/Training    Grooming Assess/Train, Position   sitting  -RC    Grooming Assess/Train, Indepen Level   minimum assist (75% patient effort)  -RC    Grooming Assess/Train, Impairments   ROM decreased  -RC    Recorded by   [RC] SHIRLENE López/L    Therapy Exercises    Bilateral Upper Extremity AROM:;20 reps;sitting;elbow flexion/extension;pronation/supination;shoulder extension/flexion;shoulder ER/IR  -CS,PG,CS2      BUE Resistance manual resistance- minimal  -CS,PG,CS2      Recorded by [CS,PG,CS2] JARED Sumner (r) Sharonda Méndez, OT Student (t) JARED Sumner (c)      Positioning and Restraints    Pre-Treatment Position sitting in chair/recliner  -CS,PG,CS2 in bed  -TW sitting in chair/recliner  -RC    Post Treatment Position chair  -CS,PG,CS2 bed  -TW bed  -RC    In Bed sitting;call light within reach;encouraged to call for assist;with family/caregiver  -CS,PG,CS2 supine;call light within reach;encouraged to call for assist;with family/caregiver  -TW supine;call light within reach;encouraged to call for assist;with family/caregiver  -RC    Recorded by [CS,PG,CS2] SHIRLENE Sumner/YOSELIN (r) Sharonda Méndez, OT Student (t) JARED Sumner (c) [TW] Sergio Jimenez PTA [RC] SHIRLENE López/YOSELIN      12/16/17 0900          Rehab Assessment/Intervention    Discipline physical therapy assistant  -LN      Document Type therapy note (daily note)  -LN      Subjective Information agree to therapy;complains of;weakness;pain  -LN      Precautions/Limitations fall precautions;spinal precautions   tlso on when up pwb l le  -LN      Recorded by [LN] Mary Petit PTA      Vital Signs    Pre Systolic BP Rehab 128  -LN      Pre Treatment Diastolic BP 63  -LN      Post Systolic BP Rehab 121  -LN      Post Treatment Diastolic BP 60  -LN      Pretreatment Heart Rate (beats/min) 102   -LN      Posttreatment Heart Rate (beats/min) 100  -LN      Pre SpO2 (%) 93  -LN      O2 Delivery Pre Treatment room air  -LN      Post SpO2 (%) 91  -LN      Pre Patient Position Supine  -LN      Intra Patient Position Standing  -LN      Post Patient Position Sitting  -LN      Recorded by [LN] Mary Petit PTA      Pain Assessment    Pain Assessment 0-10  -LN      Pain Score 0  -LN      Post Pain Score 9  -LN      Pain Type Acute pain;Surgical pain  -LN      Pain Location Back  -LN      Pain Orientation Lower   left hip  -LN      Pain Intervention(s) Medication (See MAR)  -LN      Recorded by [LN] Mary Petit PTA      Cognitive Assessment/Intervention    Orientation Status oriented to;person     -LN      Recorded by [LN] Mary Petit, LILLIAN      Mobility Assessment/Training    Extremity Weight-Bearing Status left lower extremity  -LN      Left Lower Extremity Weight-Bearing partial weight-bearing  -LN      Recorded by [LN] Mary Petit PTA      Bed Mobility, Assessment/Treatment    Bed Mobility, Assistive Device --   bed flat hr or mattress  -LN      Bed Mobility, Roll Left, [place] minimum assist (75% patient effort)  -LN      Bed Mobility, Roll Right, [place] minimum assist (75% patient effort)  -LN      Bed Mobility, Scoot/Bridge, [place] not tested  -LN      Bed Mob, Supine to Sit, [place] moderate assist (50% patient effort)  -LN      Bed Mob, Sit to Supine, [place] not tested  -LN      Bed Mob, Sidelying to Sit, [place] moderate assist (50% patient effort)  -LN      Bed Mobility, Comment spouse assisted with putting on tlso and with t/f's requiring v.c's  -LN      Recorded by [LN] Mary Petit PTA      Transfer Assessment/Treatment    Transfers, Bed-Chair [place] minimum assist (75% patient effort)  -LN      Transfers, Chair-Bed [place] not tested  -LN      Transfers, Bed-Chair-Bed, Assist Device rolling walker  -LN      Transfers, Sit-Stand [place] minimum  assist (75% patient effort);verbal cues required  -LN      Transfers, Stand-Sit [place] minimum assist (75% patient effort)  -LN      Transfers, Sit-Stand-Sit, Assist Device rolling walker  -LN      Toilet Transfer, [place] not tested  -LN      Transfer, Comment spouse assisted with gt and able to instruct pt on correct sequencing  -LN      Recorded by [LN] Mary Petit, PTA      Gait Assessment/Treatment    Gait, [place] Level minimum assist (75% patient effort)  -LN      Gait, Assistive Device rolling walker  -LN      Gait, Distance (Feet) 10  -LN      Gait, Comment as pt fatigued unable to maintain pwb left  -LN      Recorded by [LN] Mary Petit, PTA      Orthosis Location    Orthosis Location/Type neck/back  -LN      Orthosis, Neck/Back TLSO (thoracic lumbar sacral orthosis)  -LN      Recorded by [LN] Mary Petit, PTA      Positioning and Restraints    Post Treatment Position chair  -LN      In Chair notified nsg;sitting;encouraged to call for assist;with family/caregiver  -LN      Recorded by [LN] Mray Petit, PTA        User Key  (r) = Recorded By, (t) = Taken By, (c) = Cosigned By    Initials Name Effective Dates    LN Mary Petit, PTA 10/17/16 -     TW Sergio Jimenez, PTA 10/17/16 -     RC Melanie Amanda, GARBER/L 10/17/16 -     CS Liza Castro, GARBER/L 10/17/16 -     PG Sharonda Méndez, OT Student 01/31/17 -                 OT Goals       12/18/17 1113 12/17/17 1307 12/16/17 1029    Bed Mobility OT LTG    Bed Mobility OT LTG, Date Established   12/16/17  -    Bed Mobility OT LTG, Time to Achieve   by discharge  -    Bed Mobility OT LTG, Activity Type   all bed mobility  -    Bed Mobility OT LTG, [place] Level   supervision required   to engage in ADL  -    Bed Mobility OT LTG, Date Goal Reviewed 12/18/17  -CS (r) PG (t) CS (c) 12/17/17  -RC     Bed Mobility OT LTG, Outcome  goal ongoing  -     Transfer Training OT LTG    Transfer Training OT LTG, Date Established    12/16/17  -    Transfer Training OT LTG, Time to Achieve   by discharge  -    Transfer Training OT LTG, Activity Type   toilet  -    Transfer Training OT LTG, [place] Level   supervision required  -    Transfer Training OT LTG, Date Goal Reviewed 12/18/17  -CS (r) PG (t) CS (c) 12/17/17  -     Transfer Training OT LTG, Outcome  goal ongoing  [facility]     Caregiver Training OT LTG    Caregiver Training OT LTG, Date Established   12/16/17  -    Caregiver Training OT LTG, Time to Achieve   by discharge  -    Caregiver Training OT LTG, Activity Type   with home and ADL safety and orthotic management  -    Caregiver Training OT LTG, [place] Level   able to assist adequately;able to cue patient adequately  -    Caregiver Training OT LTG, Date Goal Reviewed 12/18/17  -CS (r) PG (t) CS (c) 12/17/17  -     Caregiver Training OT LTG, Outcome  goal ongoing  [facility]     ADL OT LTG    ADL OT LTG, Date Established   12/16/17  -    ADL OT LTG, Time to Achieve   by discharge  -    ADL OT LTG, Activity Type   ADL skills  -    ADL OT LTG, Additional Goal   set up feeding/grooming/toileting - min A with UB bath/dressing/brace application and LB dressing/bath.  AE/AD as needed.  -    ADL OT LTG, Date Goal Reviewed 12/18/17  -CS (r) PG (t) CS (c) 12/17/17  -     ADL OT LTG, Outcome  goal ongoing  [facility]     Functional Mobility OT LTG    Functional Mobility Goal OT LTG, Date Established   12/16/17  -    Functional Mobility Goal OT LTG, Time to Achieve   by discharge  -    Functional Mobility Goal OT LTG, [place] Level   standby assist  -    Functional Mobility Goal OT LTG, Assist Device   rolling walker  -    Functional Mobility Goal OT LTG, Distance to Achieve   to the sink;to the bathroom   within precuations/weight bear status  -    Functional Mobility Goal OT LTG, Date Goal Reviewed 12/18/17  -CS (r) PG (t) CS (c) 12/17/17  -     Functional Mobility Goal OT LTG, Outcome  goal ongoing  [facility]        User Key  (r) = Recorded By, (t) = Taken By, (c) = Cosigned By    Initials Name Provider Type     Yakelin Garber, OTR/L Occupational Therapist     Melanie Amanda, GARBER/L Occupational Therapy Assistant    DARLENE Castro GARBER/L Occupational Therapy Assistant    PG Sharonda Méndez, OT Student OT Student          Occupational Therapy Education     Title: PT OT SLP Therapies (Active)     Topic: Occupational Therapy (Done)     Point: ADL training (Done)    Description: Instruct learner(s) on proper safety adaptation and remediation techniques during self care or transfers.   Instruct in proper use of assistive devices.    Learning Progress Summary    Learner Readiness Method Response Comment Documented by Status   Patient Acceptance E,D,TB VU Pt educated on AE and usage. PG 12/18/17 1115 Done    Acceptance E VU  PG 12/18/17 1112 Done    Acceptance E NR   12/17/17 1307 Active    Acceptance E VU family present part of time. Educated on OT and POC. Educated on log roll, spinal precuations, PWB status, throughout ADL, t/f. Educated on TLSO. Educated need for assist w/ADL. Educated to call for assist.  12/16/17 1331 Done               Point: Home exercise program (Done)    Description: Instruct learner(s) on appropriate technique for monitoring, assisting and/or progressing therapeutic exercises/activities.    Learning Progress Summary    Learner Readiness Method Response Comment Documented by Status   Patient Acceptance E,D,TB VU Pt educated on AE and usage. PG 12/18/17 1115 Done    Acceptance E VU  PG 12/18/17 1112 Done               Point: Precautions (Done)    Description: Instruct learner(s) on prescribed precautions during self-care and functional transfers.    Learning Progress Summary    Learner Readiness Method Response Comment Documented by Status   Patient Acceptance E,D,TB VU Pt educated on AE and usage. PG 12/18/17 1115 Done    Acceptance E VU   12/18/17 1112 Done    Acceptance E NR    12/17/17 1307 Active    Acceptance E VU family present part of time. Educated on OT and POC. Educated on log roll, spinal precuations, PWB status, throughout ADL, t/f. Educated on TLSO. Educated need for assist w/ADL. Educated to call for assist.  12/16/17 1331 Done               Point: Body mechanics (Done)    Description: Instruct learner(s) on proper positioning and spine alignment during self-care, functional mobility activities and/or exercises.    Learning Progress Summary    Learner Readiness Method Response Comment Documented by Status   Patient Acceptance E,D,TB VU Pt educated on AE and usage.  12/18/17 1115 Done    Acceptance E VU  PG 12/18/17 1112 Done    Acceptance E NR   12/17/17 1307 Active    Acceptance E VU family present part of time. Educated on OT and POC. Educated on log roll, spinal precuations, PWB status, throughout ADL, t/f. Educated on TLSO. Educated need for assist w/ADL. Educated to call for assist.  12/16/17 1331 Done                      User Key     Initials Effective Dates Name Provider Type Discipline     10/17/16 -  Yakelin Garber OTR/L Occupational Therapist OT     10/17/16 -  SHIRLENE López/L Occupational Therapy Assistant OT    PG 01/31/17 -  Sharonda Méndez OT Student OT Student OT                  OT Recommendation and Plan  Anticipated Equipment Needs At Discharge: bathing equipment, bedside commode, dressing equipment, gait belt, raised toilet seat, reacher, shower chair  Anticipated Discharge Disposition: home with 24/7 care, home with home health, home with outpatient services, skilled nursing facility, inpatient rehabilitation facility  Planned Therapy Interventions: activity intolerance, adaptive equipment training, ADL retraining, balance training, bed mobility training, energy conservation, fine motor coordination training, motor coordination training, orthotic fitting/training, ROM (Range of Motion), strengthening, transfer training  Therapy  Frequency: other (see comments) (3-14x a week)           Outcome Measures       12/18/17 1100 12/17/17 1010 12/17/17 0840    How much help from another person do you currently need...    Turning from your back to your side while in flat bed without using bedrails?  3  -TW     Moving from lying on back to sitting on the side of a flat bed without bedrails?  2  -TW     Moving to and from a bed to a chair (including a wheelchair)?  3  -TW     Standing up from a chair using your arms (e.g., wheelchair, bedside chair)?  3  -TW     Climbing 3-5 steps with a railing?  2  -TW     To walk in hospital room?  3  -TW     AM-PAC 6 Clicks Score  16  -TW     How much help from another is currently needed...    Putting on and taking off regular lower body clothing? 1  -CS (r) PG (t) CS (c)  1  -RC    Bathing (including washing, rinsing, and drying) 2  -CS (r) PG (t) CS (c)  2  -RC    Toileting (which includes using toilet bed pan or urinal) 2  -CS (r) PG (t) CS (c)  2  -RC    Putting on and taking off regular upper body clothing 2  -CS (r) PG (t) CS (c)  2  -RC    Taking care of personal grooming (such as brushing teeth) 3  -CS (r) PG (t) CS (c)  3  -RC    Eating meals 4  -CS (r) PG (t) CS (c)  4  -RC    Score 14  -CS (r) PG (t)  14  -RC    Functional Assessment    Outcome Measure Options  AM-PAC 6 Clicks Basic Mobility (PT)  -TW       12/16/17 1029 12/16/17 0900 12/15/17 1335    How much help from another person do you currently need...    Turning from your back to your side while in flat bed without using bedrails?  3  -LN 2  -CB    Moving from lying on back to sitting on the side of a flat bed without bedrails?  2  -LN 2  -CB    Moving to and from a bed to a chair (including a wheelchair)?  3  -LN 2  -CB    Standing up from a chair using your arms (e.g., wheelchair, bedside chair)?  3  -LN 3  -CB    Climbing 3-5 steps with a railing?  2  -LN 2  -CB    To walk in hospital room?  3  -LN 2  -CB    AM-PAC 6 Clicks Score  16  -LN 13   -CB    How much help from another is currently needed...    Putting on and taking off regular lower body clothing? 1  -BH      Bathing (including washing, rinsing, and drying) 2  -BH      Toileting (which includes using toilet bed pan or urinal) 2  -BH      Putting on and taking off regular upper body clothing 2  -BH      Taking care of personal grooming (such as brushing teeth) 3  -BH      Eating meals 4  -BH      Score 14  -      Functional Assessment    Outcome Measure Options AM-PAC 6 Clicks Daily Activity (OT)  -BH AM-PAC 6 Clicks Basic Mobility (PT)  -LN AM-PAC 6 Clicks Basic Mobility (PT)  -CB      User Key  (r) = Recorded By, (t) = Taken By, (c) = Cosigned By    Initials Name Provider Type    CB Jennifer Agarwal, PT Physical Therapist    BH Yakelin Garber, OTR/L Occupational Therapist    LN Mary Petit, PTA Physical Therapy Assistant    TW Sergio Jimenez, PTA Physical Therapy Assistant    RC Melanie Amanda, GARBER/L Occupational Therapy Assistant     SHIRLENE Sumner/YOSELIN Occupational Therapy Assistant    BELA Méndez, OT Student OT Student           Time Calculation:         Time Calculation- OT       12/18/17 1146          Time Calculation- OT    OT Start Time 0956  -CS      OT Stop Time 1034  -CS      OT Time Calculation (min) 38 min  -CS      Total Timed Code Minutes- OT 38 minute(s)  -      OT Received On 12/18/17  -        User Key  (r) = Recorded By, (t) = Taken By, (c) = Cosigned By    Initials Name Provider Type     SHIRLENE Sumner/YOSELIN Occupational Therapy Assistant           Therapy Charges for Today     Code Description Service Date Service Provider Modifiers Qty    [id number]  OT SELF CARE/MGMT/TRAIN EA 15 MIN 12/18/2017 SHIRLENE Sumner/L GO 1    [id number] HC OT THER PROC EA 15 MIN 12/18/2017 JARED Sumner GO 2          OT G-codes  OT Professional Judgement Used?: Yes  OT Functional Scales Options: AM-PAC 6 Clicks Daily Activity (OT)  Score:  14  Functional Limitation: Self care  Self Care Current Status (): At least 60 percent but less than 80 percent impaired, limited or restricted  Self Care Goal Status (): At least 20 percent but less than 40 percent impaired, limited or restricted    JARED Sumner  12/18/2017

## 2017-12-18 NOTE — PLAN OF CARE
Problem: Patient Care Overview (Adult)  Goal: Plan of Care Review  Outcome: Ongoing (interventions implemented as appropriate)    12/18/17 1551   Coping/Psychosocial Response Interventions   Plan Of Care Reviewed With patient   Outcome Evaluation   Outcome Summary/Follow up Plan  able to misael brace and assist pt eob with cues. cont with therapy.         Problem: Inpatient Physical Therapy  Goal: Bed Mobility Goal STG- PT  Outcome: Ongoing (interventions implemented as appropriate)    12/15/17 1335 12/18/17 1551   Bed Mobility PT STG   Bed Mobility PT STG, Date Established 12/15/17 --    Bed Mobility PT STG, Time to Achieve 2 days --    Bed Mobility PT STG, Activity Type roll left/roll right;sidelying to sit/sit to sidelying --    Bed Mobility PT STG, [place] Level contact guard assist --    Bed Mobility PT STG, Additional Goal log rolling with HOB down and no rails,  may assist --    Bed Mobility PT STG, Date Goal Reviewed --  12/18/17   Bed Mobility PT STG, Outcome --  goal ongoing       Goal: Gait Training Goal LTG- PT  Outcome: Ongoing (interventions implemented as appropriate)    12/18/17 1551   Gait Training PT LTG   Gait Training Goal PT LTG, Date Goal Reviewed 12/18/17   Gait Training Goal PT LTG, Outcome goal ongoing         12/15/17 1335 12/18/17 1551   Gait Training PT LTG   Gait Training Goal PT LTG, Date Established 12/15/17 --    Gait Training Goal PT LTG, Time to Achieve 2 days --    Gait Training Goal PT LTG, [place] Level contact guard assist --    Gait Training Goal PT LTG, Assist Device walker, rolling --    Gait Training Goal PT LTG, Distance to Achieve 50 feet --    Gait Training Goal PT LTG, Date Goal Reviewed --  12/18/17   Gait Training Goal PT LTG, Outcome --  goal ongoing       Goal: Stair Training Goal LTG- PT  Outcome: Ongoing (interventions implemented as appropriate)    12/15/17 1335 12/18/17 1551   Stair Training PT LTG   Stair Training Goal PT LTG, Date  Established 12/15/17 --    Stair Training Goal PT LTG, Time to Achieve 2 days --    Stair Training Goal PT LTG, Number of Steps 2 --    Stair Training Goal PT LTG, [place] Level contact guard assist --    Stair Training Goal PT LTG, Assist Device walker, rolling --    Stair Training Goal PT LTG, Date Goal Reviewed --  12/18/17   Stair Training Goal PT LTG, Outcome --  goal ongoing       Goal: Strength Goal LTG- PT  Outcome: Outcome(s) achieved Date Met:  12/18/17    12/15/17 1335 12/18/17 1551   Strength Goal PT LTG   Strength Goal PT LTG, Date Established 12/15/17 --    Strength Goal PT LTG, Time to Achieve 2 days --    Strength Goal PT LTG, Measure to Achieve 10 reps all ex BLE actively --    Strength Goal PT LTG, Date Goal Reviewed --  12/18/17   Strength Goal PT LTG, Outcome --  goal met       Goal: Physical Therapy Goal STG- PT  Outcome: Ongoing (interventions implemented as appropriate)    12/15/17 1335 12/18/17 1551   Physical Therapy PT STG   Physical Therapy PT STG, Date Established 12/15/17 --    Physical Therapy PT STG, Time to Achieve 2 days --    Physical Therapy PT STG, Activity Type be able to don and doff TLSO with assist of  --    Physical Therapy PT STG, [place] Level independent --    Physical Therapy PT STG, Additional Goal do log rolling and protect spine --    Physical Therapy PT STG, Date Goal Reviewed --  12/18/17   Physical Therapy PT STG, Outcome --  goal ongoing

## 2017-12-18 NOTE — PLAN OF CARE
Problem: Patient Care Overview (Adult)  Goal: Plan of Care Review  Outcome: Ongoing (interventions implemented as appropriate)    12/18/17 1434 12/18/17 1551 12/18/17 1602   Coping/Psychosocial Response Interventions   Plan Of Care Reviewed With --  patient --    Patient Care Overview   Progress improving --  --    Outcome Evaluation   Outcome Summary/Follow up Plan --  --  Pt deferred bath this date. Completed BUE ex x20 in all available planes of motion. Continue w/POC.

## 2017-12-18 NOTE — PLAN OF CARE
Problem: Inpatient Physical Therapy  Goal: Transfer Training Goal 1 STG- PT  Outcome: Outcome(s) achieved Date Met:  12/18/17    12/15/17 1335 12/18/17 0815   Transfer Training PT STG   Transfer Training PT STG, Date Established 12/15/17 --    Transfer Training PT STG, Activity Type bed to chair /chair to bed;sit to stand/stand to sit --    Transfer Training PT STG, [place] Level contact guard assist --    Transfer Training PT STG, Assist Device walker, rolling --    Transfer Training PT STG, Date Goal Reviewed --  12/18/17   Transfer Training PT STG, Outcome --  goal met

## 2017-12-18 NOTE — PLAN OF CARE
Problem: Patient Care Overview (Adult)  Goal: Plan of Care Review  Outcome: Ongoing (interventions implemented as appropriate)    12/18/17 1344   Coping/Psychosocial Response Interventions   Plan Of Care Reviewed With patient   Patient Care Overview   Progress improving   Outcome Evaluation   Outcome Summary/Follow up Plan working with therapy to go home, no falls, up in chair, no s/s infection, assist times one with walker, therapy working with patient,       Goal: Adult Individualization and Mutuality  Outcome: Ongoing (interventions implemented as appropriate)    Problem: Orthopaedic Fracture (Adult)  Goal: Signs and Symptoms of Listed Potential Problems Will be Absent or Manageable (Orthopaedic Fracture)  Outcome: Ongoing (interventions implemented as appropriate)    Problem: Fall Risk (Adult)  Goal: Absence of Falls  Outcome: Outcome(s) achieved Date Met:  12/18/17

## 2017-12-18 NOTE — PLAN OF CARE
Problem: Patient Care Overview (Adult)  Goal: Plan of Care Review  Outcome: Ongoing (interventions implemented as appropriate)    12/18/17 0430   Coping/Psychosocial Response Interventions   Plan Of Care Reviewed With patient   Patient Care Overview   Progress progress toward functional goals is gradual   Outcome Evaluation   Outcome Summary/Follow up Plan Pt rested well, VS stable, pain controlled with meds

## 2017-12-18 NOTE — PLAN OF CARE
Problem: Patient Care Overview (Adult)  Goal: Plan of Care Review  Outcome: Ongoing (interventions implemented as appropriate)    Problem: Inpatient Occupational Therapy  Goal: Bed Mobility Goal LTG- OT  Outcome: Ongoing (interventions implemented as appropriate)    12/16/17 1029 12/17/17 1307 12/18/17 1113   Bed Mobility OT LTG   Bed Mobility OT LTG, Date Established 12/16/17 --  --    Bed Mobility OT LTG, Time to Achieve by discharge --  --    Bed Mobility OT LTG, Activity Type all bed mobility --  --    Bed Mobility OT LTG, [place] Level supervision required  (to engage in ADL) --  --    Bed Mobility OT LTG, Date Goal Reviewed --  --  12/18/17   Bed Mobility OT LTG, Outcome --  goal ongoing --        Goal: Transfer Training Goal 1 LTG- OT  Outcome: Ongoing (interventions implemented as appropriate)    12/16/17 1029 12/17/17 1307 12/18/17 1113   Transfer Training OT LTG   Transfer Training OT LTG, Date Established 12/16/17 --  --    Transfer Training OT LTG, Time to Achieve by discharge --  --    Transfer Training OT LTG, Activity Type toilet --  --    Transfer Training OT LTG, [place] Level supervision required --  --    Transfer Training OT LTG, Date Goal Reviewed --  --  12/18/17   Transfer Training OT LTG, Outcome --  goal ongoing --        Goal: Caregiver Training Goal LTG- OT  Outcome: Ongoing (interventions implemented as appropriate)    12/16/17 1029 12/17/17 1307 12/18/17 1113   Caregiver Training OT LTG   Caregiver Training OT LTG, Date Established 12/16/17 --  --    Caregiver Training OT LTG, Time to Achieve by discharge --  --    Caregiver Training OT LTG, Activity Type with home and ADL safety and orthotic management --  --    Caregiver Training OT LTG, [place] Level able to assist adequately;able to cue patient adequately --  --    Caregiver Training OT LTG, Date Goal Reviewed --  --  12/18/17   Caregiver Training OT LTG, Outcome --  goal ongoing --        Goal: ADL Goal LTG-  OT  Outcome: Ongoing (interventions implemented as appropriate)    12/16/17 1029 12/17/17 1307 12/18/17 1113   ADL OT LTG   ADL OT LTG, Date Established 12/16/17 --  --    ADL OT LTG, Time to Achieve by discharge --  --    ADL OT LTG, Activity Type ADL skills --  --    ADL OT LTG, Additional Goal set up feeding/grooming/toileting - min A with UB bath/dressing/brace application and LB dressing/bath. AE/AD as needed. --  --    ADL OT LTG, Date Goal Reviewed --  --  12/18/17   ADL OT LTG, Outcome --  goal ongoing --        Goal: Functional Mobility Goal LTG- OT  Outcome: Ongoing (interventions implemented as appropriate)    12/16/17 1029 12/17/17 1307 12/18/17 1113   Functional Mobility OT LTG   Functional Mobility Goal OT LTG, Date Established 12/16/17 --  --    Functional Mobility Goal OT LTG, Time to Achieve by discharge --  --    Functional Mobility Goal OT LTG, [place] Level standby assist --  --    Functional Mobility Goal OT LTG, Assist Device rolling walker --  --    Functional Mobility Goal OT LTG, Distance to Achieve to the sink;to the bathroom  (within precuations/weight bear status) --  --    Functional Mobility Goal OT LTG, Date Goal Reviewed --  --  12/18/17   Functional Mobility Goal OT LTG, Outcome --  goal ongoing --

## 2017-12-18 NOTE — THERAPY TREATMENT NOTE
Inpatient Rehabilitation - Physical Therapy Treatment Note  [facility]     Patient Name: Susanna Carr  : 1940  MRN: [id number]  Today's Date: 2017  Onset of Illness/Injury or Date of Surgery Date: 17  Date of Referral to PT: 12/15/17  Referring Physician: Puneet HUERTA    Admit Date: 2017    Visit Dx:    ICD-10-CM ICD-9-CM   1. Pubic ramus fracture, left, closed, initial encounter S32.592A 808.2   2. Other closed fracture of twelfth thoracic vertebra, initial encounter S22.088A 805.2   3. Closed compression fracture of second lumbar vertebra, initial encounter S32.020A 805.4   4. Impaired physical mobility Z74.09 781.99   5. Impaired gait and mobility R26.89 781.2   6. Impaired mobility and ADLs Z74.09 799.89     Patient Active Problem List   Diagnosis   • Closed fracture of ramus of left pubis   • Pathological fracture of vertebra, initial encounter   • Closed T12 spinal fracture   • Closed fracture of second lumbar vertebra   • HTN (hypertension)   • Type 2 diabetes mellitus               Adult Rehabilitation Note       17 1435 17 0956 17 0815    Rehab Assessment/Intervention    Discipline physical therapy assistant  -RW occupational therapy assistant  -CS,PG,CS2 physical therapy assistant  -AM    Document Type therapy note (daily note)  -RW therapy note (daily note)  -CS,PG,CS2 therapy note (daily note)  -AM    Subjective Information agree to therapy  -RW agree to therapy;complains of;pain  -CS,PG,CS2 agree to therapy;complains of;pain  -AM    Patient Effort, Rehab Treatment good  -RW good  -CS,PG,CS2 good  -AM    Symptoms Noted During/After Treatment   none  -AM    Precautions/Limitations fall precautions;spinal precautions  -RW fall precautions;spinal precautions  -CS,PG,CS2 brace on when up;fall precautions;other (see comments)   PWB LLE  -AM    Specific Treatment Considerations tlso pwb  -RW      Equipment Issued to Patient   gait belt  -AM     Recorded by [RW] Kody Hayes PTA [CS,PG,CS2] JARED Sumner (r) Sharonda Méndez, OT Student (t) JARED Sumner (c) [AM] Stephane Anderson PTA    Vital Signs    Pre Systolic BP Rehab  137  -CS,PG,CS2 119  -AM    Pre Treatment Diastolic BP  69  -CS,PG,CS2 58  -AM    Post Systolic BP Rehab  124  -CS,PG,CS2 137  -AM    Post Treatment Diastolic BP  76  -CS,PG,CS2 69  -AM    Pretreatment Heart Rate (beats/min) 87  -RW 94  -CS,PG,CS2 103  -AM    Posttreatment Heart Rate (beats/min) 91  -RW 93  -CS,PG,CS2 95  -AM    Pre SpO2 (%) 94  -RW 92  -CS,PG,CS2 95  -AM    O2 Delivery Pre Treatment room air  -RW room air  -CS,PG,CS2 room air  -AM    Post SpO2 (%) 94  -RW 92  -CS,PG,CS2 96  -AM    O2 Delivery Post Treatment room air  -RW room air  -CS,PG,CS2 room air  -AM    Pre Patient Position  Sitting  -CS,PG,CS2 Sitting  -AM    Intra Patient Position  Sitting  -CS,PG,CS2 Standing  -AM    Post Patient Position  Sitting  -CS,PG,CS2 Sitting  -AM    Recorded by [RW] Kody Hayes PTA [CS,PG,CS2] JARED Sumner (r) Sharonda Méndez, OT Student (t) JARED Sumner (c) [AM] Stephane Anderson PTA    Pain Assessment    Pain Assessment  0-10  -CS,PG,CS2 0-10  -AM    Pain Score --   none reported  -RW 3  -CS,PG,CS2 3  -AM    Post Pain Score  3  -CS,PG,CS2 3  -AM    Pain Type  Acute pain;Surgical pain  -CS,PG,CS2 Acute pain  -AM    Pain Location  Back  -CS,PG,CS2 Back  -AM    Pain Orientation  Lower  -CS,PG,CS2 Lower  -AM    Pain Descriptors   Sore  -AM    Pain Frequency   Constant/continuous  -AM    Date Pain First Started   12/13/17  -AM    Clinical Progression   Gradually improving  -AM    Patient's Stated Pain Goal   No pain  -AM    Pain Intervention(s)   Medication (See MAR);Ambulation/increased activity  -AM    Result of Injury   Yes  -AM    Work-Related Injury   No  -AM    Multiple Pain Sites   No  -AM    Recorded by [RW] Kody Hayes PTA [CS,PG,CS2] JARED Sumner (vera) Sharonda  Dev, OT Student (t) SHIRLENE Sumner/YOSELIN (c) [AM] Stephane Anderson PTA    Vision Assessment/Intervention    Visual Impairment  WFL with corrective lenses  -CS,PG,CS2     Recorded by  [CS,PG,CS2] SHIRLENE Sumner/YOSELIN (r) Sharonda Méndez, OT Student (t) SHIVA SumnerA/L (c)     Cognitive Assessment/Intervention    Current Cognitive/Communication Assessment functional  -RW functional  -CS,PG,CS2 functional  -AM    Orientation Status oriented x 4  -RW oriented x 4  -CS,PG,CS2 oriented x 4  -AM    Follows Commands/Answers Questions 100% of the time  -% of the time  -CS,PG,CS2 100% of the time  -AM    Personal Safety  WNL/WFL  -CS,PG,CS2     Personal Safety Interventions gait belt;nonskid shoes/slippers when out of bed  -RW fall prevention program maintained  -CS,PG,CS2 gait belt;nonskid shoes/slippers when out of bed;supervised activity  -AM    Recorded by [RW] Kody Hayes PTA [CS,PG,CS2] SHIVA SumnerA/L (r) Sharonda Méndez, OT Student (t) SHIRLENE Sumner/YOSELIN (c) [AM] Stephane Anderson PTA    ROM (Range of Motion)    General ROM   no range of motion deficits identified  -AM    Recorded by   [AM] Stephane Anderson PTA    Mobility Assessment/Training    Extremity Weight-Bearing Status left lower extremity  -RW left lower extremity  -CS,PG,CS2 left lower extremity  -AM    Left Lower Extremity Weight-Bearing partial weight-bearing  -RW partial weight-bearing  -CS,PG,CS2 partial weight-bearing  -AM    Recorded by [RW] Kody Hayes PTA [CS,PG,CS2] SHIRLENE Sumner/YOSELIN (r) Sharonda Méndez, OT Student (t) SHIRLENE Sumner/YOSELIN (c) [AM] Stephane Anderson PTA    Bed Mobility, Assessment/Treatment    Bed Mobility, Roll Left, [place] contact guard assist  -RW  contact guard assist  -AM    Bed Mobility, Roll Right, [place] contact guard assist  -RW  contact guard assist  -AM    Bed Mobility, Scoot/Bridge, [place] not tested  -RW  not tested  -AM    Bed Mob, Supine  to Sit, [place] minimum assist (75% patient effort)  -RW  minimum assist (75% patient effort)  -AM    Bed Mob, Sit to Supine, [place]   minimum assist (75% patient effort)  -AM    Bed Mob, Sidelying to Sit, [place] not tested  -RW  not tested  -AM    Bed Mob, Sit to Sidelying, [place] not tested  -RW  not tested  -AM    Bed Mobility, Safety Issues decreased use of arms for pushing/pulling;decreased use of legs for bridging/pushing  -RW  decreased use of arms for pushing/pulling;decreased use of legs for bridging/pushing  -AM    Bed Mobility, Impairments strength decreased;pain  -RW  strength decreased;pain  -AM    Recorded by [RW] Kody Hayes, PTA  [AM] Stephane Anderson PTA    Transfer Assessment/Treatment    Transfers, Bed-Chair [place] contact guard assist  -RW  contact guard assist  -AM    Transfers, Chair-Bed [place]   contact guard assist  -AM    Transfers, Bed-Chair-Bed, Assist Device rolling walker  -RW  rolling walker  -AM    Transfers, Sit-Stand [place] contact guard assist  -RW  contact guard assist  -AM    Transfers, Stand-Sit [place] contact guard assist  -RW  contact guard assist  -AM    Transfers, Sit-Stand-Sit, Assist Device rolling walker  -RW  rolling walker  -AM    Toilet Transfer, [place]   contact guard assist  -AM    Toilet Transfer, Assistive Device   bedside commode with drop arms;rolling walker  -AM    Walk-In Shower Transfer, [place] not tested  -RW  not tested  -AM    Bathtub Transfer, [place] not tested  -RW  not tested  -AM    Transfer, Maintain Weight Bearing Status   able to maintain weight bearing status  -AM    Transfer, Safety Issues step length decreased  -RW  step length decreased  -AM    Transfer, Impairments strength decreased;pain  -RW  strength decreased;pain  -AM    Recorded by [RW] Kody Hayes, PTA  [AM] Stephane Anderson PTA    Gait Assessment/Treatment    Gait, [place] Level contact guard assist  -RW  contact  guard assist  -AM    Gait, Assistive Device rolling walker  -RW  rolling walker  -AM    Gait, Distance (Feet) 4  -RW  14   6+4+4  -AM    Gait, Gait Pattern Analysis 3-point gait  -RW  3-point gait  -AM    Gait, Gait Deviations   bilateral:;juany decreased  -AM    Gait, Maintain Weight Bearing Status   able to maintain weight bearing status  -AM    Gait, Safety Issues   step length decreased  -AM    Gait, Impairments strength decreased;pain  -RW  strength decreased;pain  -AM    Recorded by [RW] Kody Hayes PTA  [AM] Stephane Anderson PTA    Stairs Assessment/Treatment    Stairs, [place] Level not tested  -RW  not tested  -AM    Recorded by [RW] Kody Hayes PTA  [AM] Stephane Anderson PTA    Lower Body Dressing Assessment/Training    LB Dressing Assess/Train, Clothing Type  doffing:;donning:;slipper socks  -CS     LB Dressing Assess/Train, Assist Device  dressing stick;reacher;sock-aid  -CS     LB Dressing Assess/Train, Position  sitting  -CS     LB Dressing Assess/Train, [place]  set up required;supervision required  -CS     Recorded by  [CS] JARED Sumner     Therapy Exercises    Bilateral Lower Extremities AROM:;20 reps;supine;ankle pumps/circles;heel slides  -RW      Bilateral Upper Extremity  AROM:;20 reps;sitting;elbow flexion/extension;pronation/supination;shoulder extension/flexion;shoulder ER/IR  -CS,PG,CS2     BUE Resistance  manual resistance- minimal  -CS,PG,CS2     Recorded by [RW] Kody Hayes PTA [CS,PG,CS2] JARED Sumner (r) Sharonda Méndez OT Student (t) JARED Sumner (c)     Orthotics Prosthetics    Additional Documentation   Orthosis Location (Group);Orthosis Management/Training (Group)  -AM    Recorded by   [AM] Stephane Anderson PTA    Orthosis Location    Orthosis Location/Type   neck/back  -AM    Orthosis, Neck/Back   TLSO (thoracic lumbar sacral orthosis)  -AM    Recorded by   [AM] Stephane Anderson PTA    Orthosis Management/Training     Orthosis Fabrication Detail   TLSO  -AM    Orthosis Indications   restrict motion  -AM    Orthosis Skills Training   doffing orthosis;donning orthosis  -AM    Orthosis Wear Schedule   wear when out of bed only  -AM    Recorded by   [AM] Stephane Anderson PTA    Positioning and Restraints    Pre-Treatment Position in bed  -RW sitting in chair/recliner  -CS,PG,CS2 sitting in chair/recliner  -AM    Post Treatment Position chair  -RW chair  -CS,PG,CS2 chair  -AM    In Bed  sitting;call light within reach;encouraged to call for assist;with family/caregiver  -CS,PG,CS2     In Chair   reclined;call light within reach;encouraged to call for assist;legs elevated  -AM    Recorded by [RW] Kody Hayes PTA [CS,PG,CS2] SHIRLENE Sumner/YOSELIN (r) Sharonda Méndez, OT Student (t) JARED Sumner (c) [AM] Stephane Anderson PTA      12/17/17 1010 12/17/17 0840 12/16/17 0900    Rehab Assessment/Intervention    Discipline physical therapy assistant  -TW occupational therapy assistant  -RC physical therapy assistant  -LN    Document Type therapy note (daily note)  -TW therapy note (daily note)  -RC therapy note (daily note)  -LN    Subjective Information agree to therapy  -TW agree to therapy  -RC agree to therapy;complains of;weakness;pain  -LN    Patient Effort, Rehab Treatment good  -TW good  -RC     Symptoms Noted During/After Treatment fatigue  -TW      Precautions/Limitations fall precautions;spinal precautions   Pt not able to maintain PWB through L LE for entire tx.  -TW non-weight bearing status;fall precautions;spinal precautions;brace on when up  -RC fall precautions;spinal precautions   tlso on when up pwb l le  -LN    Recorded by [TW] Sergio Jimenez PTA [RC] SHIRLENE López/YOSELIN [LN] Mary Petit PTA    Vital Signs    Pre Systolic BP Rehab   128  -LN    Pre Treatment Diastolic BP   63  -LN    Post Systolic BP Rehab  129  -  -LN    Post Treatment Diastolic BP  66  -RC 60  -LN    Pretreatment Heart  Rate (beats/min) 97  -TW  102  -LN    Posttreatment Heart Rate (beats/min) 98  -TW 98  -  -LN    Pre SpO2 (%) 94  -TW  93  -LN    O2 Delivery Pre Treatment room air  -TW  room air  -LN    Intra SpO2 (%) 98  -TW      Post SpO2 (%) 95  -TW 93  -RC 91  -LN    O2 Delivery Post Treatment  room air  -RC     Pre Patient Position Supine  -TW  Supine  -LN    Intra Patient Position Standing  -TW  Standing  -LN    Post Patient Position Supine  -TW  Sitting  -LN    Recorded by [TW] Sergio Jimenez PTA [RC] SHIRLENE López/L [LN] Mary Petit PTA    Pain Assessment    Pain Assessment 0-10  -TW  0-10  -LN    Pain Score 3  -TW 3  -RC 0  -LN    Post Pain Score 3  -TW 3  -RC 9  -LN    Pain Type Acute pain;Surgical pain  -TW Acute pain  -RC Acute pain;Surgical pain  -LN    Pain Location Back  -TW Back  -RC Back  -LN    Pain Orientation Lower  -TW Lower  -RC Lower   left hip  -LN    Pain Intervention(s)  Medication (See MAR);Repositioned  -RC Medication (See MAR)  -LN    Recorded by [TW] Sergio Jimenez PTA [RC] SHIRLENE López/L [LN] Mary Petit PTA    Cognitive Assessment/Intervention    Current Cognitive/Communication Assessment functional  -TW functional  -RC     Orientation Status oriented x 4  -TW oriented x 4  -RC oriented to;person     -LN    Follows Commands/Answers Questions 100% of the time  -TW      Personal Safety Interventions fall prevention program maintained;gait belt;nonskid shoes/slippers when out of bed  -TW      Recorded by [TW] Sergio Jimenez PTA [RC] SHIRLENE López/L [LN] Mary Petit PTA    Mobility Assessment/Training    Extremity Weight-Bearing Status left lower extremity  -TW  left lower extremity  -LN    Left Lower Extremity Weight-Bearing partial weight-bearing  -TW  partial weight-bearing  -LN    Recorded by [TW] Sergio Jimenez PTA  [LN] Mary Petit PTA    Bed Mobility, Assessment/Treatment    Bed Mobility, Assistive Device   --   bed flat hr or mattress   -LN    Bed Mobility, Roll Left, [place] minimum assist (75% patient effort)  -TW  minimum assist (75% patient effort)  -LN    Bed Mobility, Roll Right, [place] minimum assist (75% patient effort)  -TW  minimum assist (75% patient effort)  -LN    Bed Mobility, Scoot/Bridge, [place]   not tested  -LN    Bed Mob, Supine to Sit, [place] moderate assist (50% patient effort)  -TW  moderate assist (50% patient effort)  -LN    Bed Mob, Sit to Supine, [place] moderate assist (50% patient effort)  -TW maximum assist (25% patient effort)  -RC not tested  -LN    Bed Mob, Sidelying to Sit, [place] moderate assist (50% patient effort)  -TW  moderate assist (50% patient effort)  -LN    Bed Mobility, Comment Spouse assisted PTA to misael/doff TLSO  -TW  spouse assisted with putting on tlso and with t/f's requiring v.c's  -LN    Recorded by [TW] Sergio Jimenez, PTA [RC] SHIRLENE López/YOSELIN [LN] Mary Petit PTA    Transfer Assessment/Treatment    Transfers, Bed-Chair [place]   minimum assist (75% patient effort)  -LN    Transfers, Chair-Bed [place]  minimum assist (75% patient effort);2 person assist required  -RC not tested  -LN    Transfers, Bed-Chair-Bed, Assist Device  rolling walker  -RC rolling walker  -LN    Transfers, Sit-Stand [place] minimum assist (75% patient effort)  -TW minimum assist (75% patient effort)  -RC minimum assist (75% patient effort);verbal cues required  -LN    Transfers, Stand-Sit [place] minimum assist (75% patient effort)  -TW minimum assist (75% patient effort);2 person assist required  -RC minimum assist (75% patient effort)  -LN    Transfers, Sit-Stand-Sit, Assist Device rolling walker  -TW rolling walker  -RC rolling walker  -LN    Toilet Transfer, [place] minimum assist (75% patient effort)  -TW minimum assist (75% patient effort);2 person assist required  -RC not tested  -LN    Toilet Transfer, Assistive Device rolling walker  -TW  rolling walker  -RC     Transfer, Comment   spouse assisted with gt and able to instruct pt on correct sequencing  -LN    Recorded by [TW] Sergio Jimenez PTA [RC] JARED López [LN] Mary Petit PTA    Gait Assessment/Treatment    Gait, [place] Level minimum assist (75% patient effort)  -TW  minimum assist (75% patient effort)  -LN    Gait, Assistive Device rolling walker  -TW  rolling walker  -LN    Gait, Distance (Feet) 8   x2 trials to C and back to bed after completing duties.  -TW  10  -LN    Gait, Comment   as pt fatigued unable to maintain pwb left  -LN    Recorded by [TW] Sergio Jimenez PTA  [LN] Mary Petit PTA    Functional Mobility    Functional Mobility- Ind. Level  minimum assist (75% patient effort);2 person assist required  -RC     Functional Mobility- Device  rolling walker  -RC     Functional Mobility-Distance (Feet)  1  -RC     Functional Mobility-Maintain WBing  unable to maintain weight bearing status  -RC     Recorded by  [RC] SHIRLENE López/L     ADL Assessment/Intervention    Additional Documentation  --   declined bath  -RC     Recorded by  [RC] SHIRLENE López/L     Toileting Assessment/Training    Toileting Assess/Train, Assistive Device  bedside commode  -RC     Toileting Assess/Train, Position  sitting;standing  -RC     Toileting Assess/Train, [place] Level  minimum assist (75% patient effort)  -RC     Recorded by  [RC] SHIRLENE López/L     Grooming Assessment/Training    Grooming Assess/Train, Position  sitting  -RC     Grooming Assess/Train, [place] Level  minimum assist (75% patient effort)  -RC     Grooming Assess/Train, Impairments  ROM decreased  -RC     Recorded by  [RC] SHIRLENE López/L     Orthosis Location    Orthosis Location/Type   neck/back  -LN    Orthosis, Neck/Back   TLSO (thoracic lumbar sacral orthosis)  -LN    Recorded by   [LN] Mary Petit PTA    Positioning and Restraints    Pre-Treatment Position in bed  -TW  sitting in chair/recliner  -RC     Post Treatment Position bed  -TW bed  -RC chair  -LN    In Bed supine;call light within reach;encouraged to call for assist;with family/caregiver  -TW supine;call light within reach;encouraged to call for assist;with family/caregiver  -RC     In Chair   notified nsg;sitting;encouraged to call for assist;with family/caregiver  -LN    Recorded by [TW] Sergio Jimenez, PTA [RC] Melanie Amanda, GARBER/L [LN] Mary Petit, PTA      User Key  (r) = Recorded By, (t) = Taken By, (c) = Cosigned By    Initials Name Effective Dates    AM Stephane Anderson, PTA 10/17/16 -     LN Mary Petit, PTA 10/17/16 -     TW Sergio Jimenez, PTA 10/17/16 -     RW Kody Hayes, PTA 10/17/16 -     RC Melanie Amanda, GARBER/L 10/17/16 -     CS Liza Castro, GARBER/L 10/17/16 -     PG Sharonda Méndze, OT Student 01/31/17 -                 IP PT Goals       12/18/17 1551 12/18/17 0815 12/17/17 1010    Bed Mobility PT STG    Bed Mobility PT STG, Date Goal Reviewed 12/18/17  -RW  12/17/17  -TW    Bed Mobility PT STG, Outcome goal ongoing  -RW  goal ongoing  -TW    Transfer Training PT STG    Transfer Training PT STG, Date Goal Reviewed  12/18/17  -AM 12/17/17  -TW    Transfer Training PT STG, Outcome  goal met  -AM goal ongoing  -TW    Gait Training PT LTG    Gait Training Goal PT LTG, Date Goal Reviewed 12/18/17  -RW  12/17/17  -TW    Gait Training Goal PT LTG, Outcome goal ongoing  -RW  goal ongoing  -TW    Stair Training PT LTG    Stair Training Goal PT LTG, Date Goal Reviewed 12/18/17  -RW  12/17/17  -TW    Stair Training Goal PT LTG, Outcome goal ongoing  -RW  goal ongoing  -TW    Strength Goal PT LTG    Strength Goal PT LTG, Date Goal Reviewed 12/18/17  -RW      Strength Goal PT LTG, Outcome goal met  -RW  goal ongoing  -TW    Physical Therapy PT STG    Physical Therapy PT STG, Date Goal Reviewed 12/18/17  -RW  12/17/17  -TW    Physical Therapy PT STG, Outcome goal ongoing  -RW  goal ongoing  -TW       12/16/17 0900 12/15/17 1335       Bed Mobility PT STG    Bed Mobility PT STG, Date Established  12/15/17  -CB     Bed Mobility PT STG, Time to Achieve  2 days  -CB     Bed Mobility PT STG, Activity Type  roll left/roll right;sidelying to sit/sit to sidelying  -CB     Bed Mobility PT STG, [place] Level  contact guard assist  -CB     Bed Mobility PT STG, Additional Goal  log rolling with HOB down and no rails,  may assist  -CB     Bed Mobility PT STG, Date Goal Reviewed 12/16/17  -LN      Bed Mobility PT STG, Outcome goal not met  -LN      Transfer Training PT STG    Transfer Training PT STG, Date Established  12/15/17  -CB     Transfer Training PT STG, Activity Type  bed to chair /chair to bed;sit to stand/stand to sit  -CB     Transfer Training PT STG, [place] Level  contact guard assist  -CB     Transfer Training PT STG, Assist Device  walker, rolling  -CB     Transfer Training PT STG, Date Goal Reviewed 12/16/17  -LN      Transfer Training PT STG, Outcome goal not met  -LN      Gait Training PT LTG    Gait Training Goal PT LTG, Date Established  12/15/17  -CB     Gait Training Goal PT LTG, Time to Achieve  2 days  -CB     Gait Training Goal PT LTG, [place] Level  contact guard assist  -CB     Gait Training Goal PT LTG, Assist Device  walker, rolling  -CB     Gait Training Goal PT LTG, Distance to Achieve  50 feet  -CB     Gait Training Goal PT LTG, Date Goal Reviewed 12/16/17  -LN      Gait Training Goal PT LTG, Outcome goal not met  -LN      Stair Training PT LTG    Stair Training Goal PT LTG, Date Established  12/15/17  -CB     Stair Training Goal PT LTG, Time to Achieve  2 days  -CB     Stair Training Goal PT LTG, Number of Steps  2  -CB     Stair Training Goal PT LTG, [place] Level  contact guard assist  -CB     Stair Training Goal PT LTG, Assist Device  walker, rolling  -CB     Stair Training Goal PT LTG, Date Goal Reviewed 12/16/17  -LN      Stair Training Goal PT LTG, Outcome  goal not met  -LN      Strength Goal PT LTG    Strength Goal PT LTG, Date Established  12/15/17  -CB     Strength Goal PT LTG, Time to Achieve  2 days  -CB     Strength Goal PT LTG, Measure to Achieve  10 reps all ex BLE actively  -CB     Strength Goal PT LTG, Date Goal Reviewed 12/16/17  -LN      Strength Goal PT LTG, Outcome goal not met  -LN      Physical Therapy PT STG    Physical Therapy PT STG, Date Established  12/15/17  -CB     Physical Therapy PT STG, Time to Achieve  2 days  -CB     Physical Therapy PT STG, Activity Type  be able to don and doff TLSO with assist of   -CB     Physical Therapy PT STG, [place] Level  independent  -CB     Physical Therapy PT STG, Additional Goal  do log rolling and protect spine  -CB     Physical Therapy PT STG, Date Goal Reviewed 12/16/17  -LN      Physical Therapy PT STG, Outcome goal not met  -LN        User Key  (r) = Recorded By, (t) = Taken By, (c) = Cosigned By    Initials Name Provider Type    CB Jennifer Agarwal, PT Physical Therapist    AM Stephane Anderson, PTA Physical Therapy Assistant    LN Mary Petit, PTA Physical Therapy Assistant    TW Sergio Jimenez, PTA Physical Therapy Assistant    RW Kody Hayes, PTA Physical Therapy Assistant          Physical Therapy Education     Title: PT OT SLP Therapies (Active)     Topic: Physical Therapy (Active)     Point: Mobility training (Active)    Learning Progress Summary    Learner Readiness Method Response Comment Documented by Status   Patient Acceptance E SUKUMAR VIGIL reviewed log roll and donning of brace and then transsfers oob all wiht pt and spouse. reviewed ttwb though pt is listed as pwb. RW 12/18/17 1548 Done    Acceptance E NR misael/doff tlso,tlso on when pt up,checking skin integrety LN 12/16/17 1243 Active    Acceptance D NR  CB 12/15/17 1540 Active   Family Acceptance E NR misael/doff tlso,tlso on when pt up,checking skin integrety LN 12/16/17 1243 Active    Acceptance D NR  CB 12/15/17 1540 Active    Significant Other Acceptance E SUKUMAR VIGIL reviewed log roll and donning of brace and then transsfers oob all wiht pt and spouse. reviewed ttwb though pt is listed as pwb.  12/18/17 1548 Done               Point: Home exercise program (Active)    Learning Progress Summary    Learner Readiness Method Response Comment Documented by Status   Patient Acceptance E SUKUMAR VIGIL reviewed log roll and donning of brace and then transsfers oob all wiht pt and spouse. reviewed ttwb though pt is listed as pwb.  12/18/17 1548 Done    Acceptance D NR  CB 12/15/17 1540 Active   Family Acceptance D NR  CB 12/15/17 1540 Active   Significant Other Acceptance E SUKUMAR VIGIL reviewed log roll and donning of brace and then transsfers oob all wiht pt and spouse. reviewed ttwb though pt is listed as pwb.  12/18/17 1548 Done               Point: Body mechanics (Active)    Learning Progress Summary    Learner Readiness Method Response Comment Documented by Status   Patient Acceptance E SUKUMAR VIGIL reviewed log roll and donning of brace and then transsfers oob all wiht pt and spouse. reviewed ttwb though pt is listed as pwb.  12/18/17 1548 Done    Acceptance E NR misael/doff tlso,tlso on when pt up,checking skin integrety LN 12/16/17 1243 Active    Acceptance D NR  CB 12/15/17 1540 Active   Family Acceptance E NR misael/doff tlso,tlso on when pt up,checking skin integrety LN 12/16/17 1243 Active    Acceptance D NR  CB 12/15/17 1540 Active   Significant Other Acceptance E SUKUMAR VIGIL reviewed log roll and donning of brace and then transsfers oob all wiht pt and spouse. reviewed ttwb though pt is listed as pwb.  12/18/17 1548 Done               Point: Precautions (Active)    Learning Progress Summary    Learner Readiness Method Response Comment Documented by Status   Patient Acceptance E SUKUMAR VIGIL reviewed log roll and donning of brace and then transsfers oob all wiht pt and spouse. reviewed ttwb though pt is listed as pwb.  12/18/17 1548 Done    Acceptance E NR  misael/doff tlso,tlso on when pt up,checking skin integrety LN 12/16/17 1243 Active    Acceptance D NR  CB 12/15/17 1540 Active   Family Acceptance E NR misael/doff tlso,tlso on when pt up,checking skin integrety LN 12/16/17 1243 Active    Acceptance D NR  CB 12/15/17 1540 Active   Significant Other Acceptance E YARITZA,SUKUMAR reviewed log roll and donning of brace and then transsfers oob all wiht pt and spouse. reviewed ttwb though pt is listed as pwb. RW 12/18/17 1548 Done                      User Key     Initials Effective Dates Name Provider Type Discipline    CB 04/06/17 -  Jennifer Agarwal, PT Physical Therapist PT    LN 10/17/16 -  Mary Petit, PTA Physical Therapy Assistant PT    RW 10/17/16 -  Kody Hayes, PTA Physical Therapy Assistant PT                    PT Recommendation and Plan  Anticipated Equipment Needs At Discharge:  (none)  Anticipated Discharge Disposition: home with home health, home with 24/7 care  Demonstrates Need for Referral to Another Service: home health care  Planned Therapy Interventions: bed mobility training, gait training, home exercise program, orthotic fitting/training, patient/family education, stair training, strengthening, transfer training  PT Frequency: per priority policy (5-14)  Plan of Care Review  Plan Of Care Reviewed With: patient  Outcome Summary/Follow up Plan:  able to misael brace and assist pt eob with cues. cont with therapy.          Outcome Measures       12/18/17 1100 12/18/17 0815 12/17/17 1010    How much help from another person do you currently need...    Turning from your back to your side while in flat bed without using bedrails?  3  -AM 3  -TW    Moving from lying on back to sitting on the side of a flat bed without bedrails?  3  -AM 2  -TW    Moving to and from a bed to a chair (including a wheelchair)?  3  -AM 3  -TW    Standing up from a chair using your arms (e.g., wheelchair, bedside chair)?  3  -AM 3  -TW    Climbing 3-5 steps with a railing?  1  -AM  2  -TW    To walk in hospital room?  3  -AM 3  -TW    AM-PAC 6 Clicks Score  16  -AM 16  -TW    How much help from another is currently needed...    Putting on and taking off regular lower body clothing? 1  -CS (r) PG (t) CS (c)      Bathing (including washing, rinsing, and drying) 2  -CS (r) PG (t) CS (c)      Toileting (which includes using toilet bed pan or urinal) 2  -CS (r) PG (t) CS (c)      Putting on and taking off regular upper body clothing 2  -CS (r) PG (t) CS (c)      Taking care of personal grooming (such as brushing teeth) 3  -CS (r) PG (t) CS (c)      Eating meals 4  -CS (r) PG (t) CS (c)      Score 14  -CS (r) PG (t)      Functional Assessment    Outcome Measure Options  AM-PAC 6 Clicks Basic Mobility (PT)  -AM AM-PAC 6 Clicks Basic Mobility (PT)  -TW      12/17/17 0840 12/16/17 1029 12/16/17 0900    How much help from another person do you currently need...    Turning from your back to your side while in flat bed without using bedrails?   3  -LN    Moving from lying on back to sitting on the side of a flat bed without bedrails?   2  -LN    Moving to and from a bed to a chair (including a wheelchair)?   3  -LN    Standing up from a chair using your arms (e.g., wheelchair, bedside chair)?   3  -LN    Climbing 3-5 steps with a railing?   2  -LN    To walk in hospital room?   3  -LN    AM-PAC 6 Clicks Score   16  -LN    How much help from another is currently needed...    Putting on and taking off regular lower body clothing? 1  -RC 1  -BH     Bathing (including washing, rinsing, and drying) 2  -RC 2  -BH     Toileting (which includes using toilet bed pan or urinal) 2  -RC 2  -BH     Putting on and taking off regular upper body clothing 2  -RC 2  -BH     Taking care of personal grooming (such as brushing teeth) 3  -RC 3  -BH     Eating meals 4  -RC 4  -BH     Score 14  -RC 14  -BH     Functional Assessment    Outcome Measure Options  AM-PAC 6 Clicks Daily Activity (OT)  - AM-PAC 6 Clicks Basic  Mobility (PT)  -LN      User Key  (r) = Recorded By, (t) = Taken By, (c) = Cosigned By    Initials Name Provider Type     Yakelin Garber, OTR/L Occupational Therapist    AM Stephane Anderson, LILLIAN Physical Therapy Assistant    LN Mary Petit, PTA Physical Therapy Assistant    TW Sergio Jimenez, PTA Physical Therapy Assistant    RC Melanie Amanda, GARBER/L Occupational Therapy Assistant    CS Liza Castro, GARBER/L Occupational Therapy Assistant    PG Sharonda Méndez, OT Student OT Student           Time Calculation:         PT Charges       12/18/17 1554 12/18/17 0815       Time Calculation    Start Time 1435  -RW 0815  -AM     Stop Time 1515  -RW 0915  -AM     Time Calculation (min) 40 min  -RW 60 min  -AM     PT Received On  12/18/17  -AM     PT - Next Appointment  12/18/17  -AM     Time Calculation- PT    Total Timed Code Minutes- PT 40 minute(s)  -RW 60 minute(s)  -AM       User Key  (r) = Recorded By, (t) = Taken By, (c) = Cosigned By    Initials Name Provider Type    AM Stephane Anderson PTA Physical Therapy Assistant    RW Kody Hayes PTA Physical Therapy Assistant          Therapy Charges for Today     Code Description Service Date Service Provider Modifiers Qty    51601477448 HC PT THER PROC EA 15 MIN 12/18/2017 Kody Hayes, LILLIAN GP 1    17300417965 HC PT THERAPEUTIC ACT EA 15 MIN 12/18/2017 Kody Hayes PTA GP 1    45390840260 HC PT SELF CARE/MGMT/TRAIN EA 15 MIN 12/18/2017 Kody Hayes, LILLIAN GP 1          PT G-Codes  PT Professional Judgement Used?: Yes  Outcome Measure Options: AM-PAC 6 Clicks Basic Mobility (PT)  Score: 13  Functional Limitation: Mobility: Walking and moving around  Mobility: Walking and Moving Around Current Status (): At least 40 percent but less than 60 percent impaired, limited or restricted  Mobility: Walking and Moving Around Goal Status (): At least 20 percent but less than 40 percent impaired, limited or restricted    Kody Hayes  PTA  12/18/2017

## 2017-12-18 NOTE — PROGRESS NOTES
[facility] Medicine Services  INPATIENT PROGRESS NOTE    Length of Stay: 2  Date of Admission: 12/13/2017  Primary Care Physician: Troy Cheng MD    Subjective   Chief Complaint: Fall, back pain  HPI:  77 year old female with a history of DM II, HTN, and osteoporosis who suffered a fall and sustained fractures to her left pubic ramus, L2, and T12.  She was evaluated by spine/orthopedics who recommend brace and pain control, no surgical management.  Brace has been fitted and she is working with therapy.  She reports pain is still severe but mobility improved with brace. Patient did not meet admission criteria for ARU.  They are offered SNF placement for rehab to home and refuse.  They wish to be discharged to home.  They will require hospital bed, wheelchair, and walker.     Review of Systems   Constitutional: Negative for chills and fever.   Respiratory: Negative for shortness of breath.    Cardiovascular: Negative for chest pain and palpitations.   Gastrointestinal: Negative for abdominal pain, diarrhea, nausea and vomiting.   Musculoskeletal: Positive for arthralgias and back pain.      All pertinent negatives and positives are as above. All other systems have been reviewed and are negative unless otherwise stated.     Objective    Temp:  [96.7 °F (35.9 °C)-98 °F (36.7 °C)] 97.8 °F (36.6 °C)  Heart Rate:  [] 94  Resp:  [18] 18  BP: ()/(57-64) 98/57    Physical Exam   Constitutional: She is oriented to person, place, and time. She appears well-developed and well-nourished.   HENT:   Head: Normocephalic and atraumatic.   Cardiovascular: Normal rate, regular rhythm, normal heart sounds and intact distal pulses.    Pulmonary/Chest: Effort normal and breath sounds normal. No respiratory distress.   Abdominal: Soft. Bowel sounds are normal. She exhibits no distension. There is no tenderness.   Musculoskeletal: Normal range of motion. She exhibits no  edema or deformity.   Neurological: She is alert and oriented to person, place, and time. She exhibits normal muscle tone.   Skin: Skin is warm and dry. No erythema.   Vitals reviewed.    Results Review:  I have reviewed the labs, radiology results, and diagnostic studies.    Laboratory Data:     Results from last 7 days  Lab Units 12/15/17  0541 12/14/17  0546   SODIUM mmol/L 133* 132*   POTASSIUM mmol/L 3.8 3.5   CHLORIDE mmol/L 96 97   CO2 mmol/L 25.0 24.0   BUN mg/dL 20 17   CREATININE mg/dL 0.69 0.60   GLUCOSE mg/dL 147* 181*   CALCIUM mg/dL 8.7 9.0   ANION GAP mmol/L 12.0 11.0     Estimated Creatinine Clearance: 56 mL/min (by C-G formula based on Cr of 0.69).            Results from last 7 days  Lab Units 12/15/17  0541 12/14/17  0546   WBC 10*3/mm3 10.42* 8.42   HEMOGLOBIN g/dL 11.5* 11.0*   HEMATOCRIT % 34.9* 33.4*   PLATELETS 10*3/mm3 172 164           Culture Data:   No results found for: BLOODCX  No results found for: URINECX  No results found for: RESPCX  No results found for: WOUNDCX  No results found for: STOOLCX  No components found for: BODYFLD    Radiology Data:   Imaging Results (last 24 hours)     ** No results found for the last 24 hours. **          I have reviewed the patient current medications.     Assessment/Plan     Hospital Problem List     * (Principal)Closed T12 spinal fracture    Closed fracture of ramus of left pubis    Closed fracture of second lumbar vertebra    HTN (hypertension)    Type 2 diabetes mellitus          Plan:    TLSO  Pain control: PRN norco, PRN morphine  PT/OT  BP control with atenolol, lisinopril, and HCTZ  Glucose control with metformin/glipizide/SSI  Discharge planning: obtain hospital bed, walker, wheelchair; arrange for home health PT/OT/Nursing        This document has been electronically signed by DEANNA Cordova on December 18, 2017 3:41 PM

## 2017-12-18 NOTE — DISCHARGE PLACEMENT REQUEST
"Keagan Carr (77 y.o. Female)     Date of Birth Social Security Number Address Home Phone MRN    1940  [address] [id number] [phone] [id number]    Scientologist Marital Status          Evangelical        Admission Date Admission Type Admitting Provider Attending Provider Department, Room/Bed    17 Emergency Perico Gardiner MD Gibson, Bryce, MD [address], [facility]/1    Discharge Date Discharge Disposition Discharge Destination         Home-Health Care [facility]             Attending Provider: Perico Gardiner MD     Allergies:  Niacin And Related, Other    Isolation:  None   Infection:  None   Code Status:  FULL    Ht:  162.6 cm (64\")   Wt:  70.9 kg (156 lb 3.2 oz)    Admission Cmt:  None   Principal Problem:  Closed T12 spinal fracture [S22.089A]                 Active Insurance as of 2017     Primary Coverage     Payor Plan Insurance Group Employer/Plan Group    HUMANA MEDICARE REPLACEMENT HUMANA MEDICARE REPL [id number]     Payor Plan Address Payor Plan Phone Number Effective From Effective To    [address] [address] [phone] [address]     [place] [address]       Subscriber Name Subscriber Birth Date Member ID       KEAGAN CARR 1940 [id number]                 Emergency Contacts      (Rel.) Home Phone Work Phone Mobile Phone    Abdulaziz Carr (Spouse) [phone] -- [phone]    April Grady (Daughter) [phone] -- [phone]        Kathie Grimm RN [facility]  [phone] phone  [phone] fax    [address] [address]  Phone:  [phone]  Fax:   Date Ordered: Dec 18, 2017         Patient:  Keagan Carr MRN:  [id number]   [address] [facility] [address] :  1940  SSN:    Phone: [phone] Sex:  F     Weight: 70.9 kg (156 lb 3.2 oz)         Ht Readings from Last 1 Encounters:   17 162.6 cm (64\")    "      Hospital Bed            (Order ID: [id number])    Diagnosis:  Pubic ramus fracture, left, closed, initial encounter (S32.592A [ICD-10-CM] 808.2 [ICD-9-CM])  Other closed fracture of twelfth thoracic vertebra, initial encounter (S22.088A [ICD-10-CM] 805.2 [ICD-9-CM])  Closed compression fracture of second lumbar vertebra, initial encounter (S32.020A [ICD-10-CM] 805.4 [ICD-9-CM])  Impaired physical mobility (Z74.09 [ICD-10-CM] 781.99 [ICD-9-CM])  Impaired gait and mobility (R26.89 [ICD-10-CM] 781.2 [ICD-9-CM])  Impaired mobility and ADLs (Z74.09 [ICD-10-CM] 799.89 [ICD-9-CM])   Quantity:  1     Equipment:  Hospital Bed, Semi-Electirc w/ Mattress & w/ Rails  Length of Need (99 Months = Lifetime): 99 Months = Lifetime            Authorizing Provider:DEANNA Cordova  Authorizing Provider's NPI: [id number]  Order Entered By: DEANNA Cordova 12/18/2017  3:11 PM     Electronically signed by: DEANNA Cordova 12/18/2017  3:11 PM                History & Physical      DEANNA Terrell at 12/13/2017  2:09 PM     Attestation signed by Perico Gardiner MD at 12/13/2017 10:16 PM        I personally evaluated and examined the patient in conjunction with DEANNA Terrell and agree with the assessment, treatment plan, and disposition of the patient as recorded.    Exam:  Chest: CTAB          This document has been electronically signed by Perico Gardiner MD on December 13, 2017 10:16 PM                                           [facility] Medicine Admission      Date of Admission: 12/13/2017      Primary Care Physician: Troy Cheng MD      Chief Complaint: Fall    HPI: 77-year-old  female past medical history of type 2 diabetes, hypertension, hyperlipidemia, osteoporosis, skin cancer who presents after suffering a fall last night around 11 PM.  The patient reports that she was walking to the restroom at approximately 11 PM  last night and did not turn the lights on.  She reports that she thought she was walking in to her restroom but instead walked through the doorway which was her basement steps.  The patient fell down approximately 11-12 steps.  She was brought into the emergency department and found to have left pubic ramus fracture, L2 vertebral body compression deformities, and possible acute transverse fracture of the T12 vertebrae.  The patient's biggest complaint is lower back pain post fall.      Concurrent Medical History: Type 2 diabetes, hypertension, hyperlipidemia, osteoporosis, skin cancer    Past Surgical History: Left ankle fracture repair, cholecystectomy    Family History: Family history is significant for coronary artery disease and type 2 diabetes in the patient's mother is .  Patient's father is  and also had history of coronary artery disease.  Patient reports having 2 siblings who are  related to lung cancer and 1 brother who is  related to colon cancer    Social History:  reports that she has quit smoking. She does not have any smokeless tobacco history on file. She reports that she does not drink alcohol or use illicit drugs.    Allergies:   Allergies   Allergen Reactions   • Niacin And Related        Medications:   Prior to Admission medications    Medication Sig Start Date End Date Taking? Authorizing Provider   aspirin 81 MG chewable tablet Chew 81 mg Daily.   Yes Historical Provider, MD   atenolol (TENORMIN) 25 MG tablet Take 25 mg by mouth Daily.   Yes Historical Provider, MD   glipiZIDE (GLUCOTROL) 5 MG tablet Take 5 mg by mouth 2 (Two) Times a Day Before Meals.   Yes Historical Provider, MD   hydrochlorothiazide (HYDRODIURIL) 12.5 MG tablet Take 12.5 mg by mouth Daily.   Yes Historical Provider, MD   lisinopril (PRINIVIL,ZESTRIL) 2.5 MG tablet Take 2.5 mg by mouth Daily.   Yes Historical Provider, MD   metFORMIN (GLUCOPHAGE) 1000 MG tablet Take 1,000 mg by mouth 2 (Two)  Times a Day With Meals.   Yes Historical Provider, MD   omeprazole (priLOSEC) 20 MG capsule Take 20 mg by mouth Daily.   Yes Historical Provider, MD   simvastatin (ZOCOR) 40 MG tablet Take 40 mg by mouth Every Night.   Yes Historical Provider, MD       Review of Systems:  Review of Systems   Constitutional: Negative for chills and fever.   Respiratory: Negative for chest tightness, shortness of breath and wheezing.    Cardiovascular: Negative for chest pain, palpitations and leg swelling.   Gastrointestinal: Negative for abdominal pain, constipation, diarrhea, nausea and vomiting.   Musculoskeletal: Positive for back pain and gait problem.   Skin: Negative for pallor.   Psychiatric/Behavioral: Negative for confusion.      Otherwise complete ROS is negative except as mentioned above.    Physical Exam:   Temp:  [98 °F (36.7 °C)] 98 °F (36.7 °C)  Heart Rate:  [] 101  Resp:  [16-18] 18  BP: (164-193)/(76-88) 164/76  Physical Exam   Constitutional: She is oriented to person, place, and time. She appears well-developed and well-nourished.   HENT:   Head: Normocephalic and atraumatic.   Eyes: Conjunctivae are normal.   Neck: Neck supple.   Cardiovascular: Normal rate, normal heart sounds and intact distal pulses.    Pulmonary/Chest: Effort normal and breath sounds normal.   Abdominal: Soft. Bowel sounds are normal. She exhibits no distension. There is no tenderness.   Musculoskeletal: She exhibits no edema.   Neurological: She is alert and oriented to person, place, and time.   Skin: Skin is warm and dry.   Psychiatric: She has a normal mood and affect. Her behavior is normal.   Vitals reviewed.        Results Reviewed:  I have personally reviewed current lab, radiology, and data and agree with results.  Lab Results (last 24 hours)     ** No results found for the last 24 hours. **        Imaging Results (last 24 hours)     Procedure Component Value Units Date/Time    CT Head Without Contrast [[id number]] Collected:   12/13/17 1137     Updated:  12/13/17 1150    Narrative:       .      EXAMINATION:  Computed Tomography      REGION:  Head             INDICATION:  injury    HISTORY:  CORRELATIVE IMAGING:    none    TECHNIQUE:  iv contrast:  no    This exam was performed according to the departmental  dose-optimization program which includes automated exposure  control, adjustment of the mA and/or kV according to patient size  and/or use of iterative reconstruction technique.              COMMENTS:                - atrophy: wnl for age       - cortex:                wnl for age      - deep white mat: wnl for age       - hemorrhage:        none       - fluid collection:  no intra/extra axial fluid collection     - mass / lesion:     no focal parenchymal lesion(s)       - gray/white jxn:  borders preserved         - brain stem:        wnl       - cerebellum:       wnl       - globes / retro:    wnl       - ventricles:         normal size / configuration       - midline shift:     no       - sinuses:             wnl       - mastoids:           wnl        - osseous:             wnl       - misc.:    .       Impression:       CONCLUSION:  1.  Negative examination for acute intracranial pathology.          Electronically signed by:  YULIYA Soriano MD  12/13/2017 11:49  AM CST Workstation: [id number]    CT Abdomen Pelvis Without Contrast [[id number]] Collected:  12/13/17 1132     Updated:  12/13/17 1201    Narrative:         EXAMINATION:  Computed Tomography           REGION:    Chest / Abdomen / Pelvis                     INDICATION:     injury    HISTORY:  ZANDRA. IMAGING:    none            TECHNIQUE:      - reconstructions:    axial, coronal, sagittal         - contrast:      oral:  no ;   intravenous:  no     - Please note:      - Lack of IV contrast limits assessment of solid organ  parenchyma, urinary system, or vascular structures.      - Lack of oral contrast limits assessment of GI tract  structures or peritoneal disease.    This  exam was performed according to the departmental  dose-optimization program which includes automated exposure  control, adjustment of the mA and/or kV according to patient size  and/or use of iterative reconstruction technique.          COMMENTS:           PULMONARY PARENCHYMA:         The air spaces are grossly unremarkable.    The pulmonary interstitium are grossly within normal limits for  age.         There are no pulmonary nodules or mass.                 MEDIASTINUM / JUAN J:         The heart is of normal size and there is no pericardial fluid.     The aorta and great vessels are of normal caliber and  configuration for age.     No mediastinal mass or significant adenopathy.             PLEURAL COMPARTMENT:       There is no pleural fluid or air.                MISC:       The inferior neck is negative.     The osseous and body wall are negative.           ABDOMEN:    Limited assessment of the solid organ parenchyma is grossly  unremarkable demonstrating no evidence of organomegaly.  Status post cholecystectomy  Limited assessment of the viscera is grossly unremarkable  demonstrating normal caliber bowel loops. No evidence of free  fluid or free intraperitoneal air.  No jaime fracture. Vertebral body compression deformities of L2,  and to a lesser extent at T12. The T12 level may be associated  with a transverse fracture, nondisplaced.    Sclerotic centimeter size bone lesion involving L4, likely  benign.    RETROPERITONEUM:  Limited assessment of the kidneys demonstrates overall normal  size.  Limited assessment of the ureters demonstrates normal caliber and  course.  The adrenal glands are of normal size and contour.  No gross evidence of significant retroperitoneal adenopathy.  The vascular structures are grossly within normal limits for age.     PELVIS:   Limited assessment of the viscera is grossly unremarkable  demonstrating normal caliber bowel loops. No evidence of free  fluid or free intraperitoneal  air.  There is a minimally displaced fracture of the inferior pubic  ramus on the left.  The vascular structures are grossly within normal limits for age.     .       Impression:        CONCLUSION:  1. Limited examination due to the lack of intravenous contrast.  2. No gross evidence of vascular or solid organ injury,  examination limited in that regard lacking intravenous contrast,  which is recommended for trauma assessments.  3. There is a minimally displaced fracture involving the left  inferior pubic ramus.  4. Possible acute transverse fracture involving T12 which is  nondisplaced.  5. Vertebral body compression deformity of L2, time course  uncertain.      Electronically signed by:  YULIYA Soriano MD  12/13/2017 12:00  PM CST Workstation: [id number]    CT Chest Without Contrast [[id number]] Collected:  12/13/17 1132     Updated:  12/13/17 1201    Narrative:         EXAMINATION:  Computed Tomography           REGION:    Chest / Abdomen / Pelvis                     INDICATION:     injury    HISTORY:  ZANDRA. IMAGING:    none            TECHNIQUE:      - reconstructions:    axial, coronal, sagittal         - contrast:      oral:  no ;   intravenous:  no     - Please note:      - Lack of IV contrast limits assessment of solid organ  parenchyma, urinary system, or vascular structures.      - Lack of oral contrast limits assessment of GI tract  structures or peritoneal disease.    This exam was performed according to the departmental  dose-optimization program which includes automated exposure  control, adjustment of the mA and/or kV according to patient size  and/or use of iterative reconstruction technique.          COMMENTS:           PULMONARY PARENCHYMA:         The air spaces are grossly unremarkable.    The pulmonary interstitium are grossly within normal limits for  age.         There are no pulmonary nodules or mass.                 MEDIASTINUM / JUAN J:         The heart is of normal size and there is no  pericardial fluid.     The aorta and great vessels are of normal caliber and  configuration for age.     No mediastinal mass or significant adenopathy.             PLEURAL COMPARTMENT:       There is no pleural fluid or air.                MISC:       The inferior neck is negative.     The osseous and body wall are negative.           ABDOMEN:    Limited assessment of the solid organ parenchyma is grossly  unremarkable demonstrating no evidence of organomegaly.  Status post cholecystectomy  Limited assessment of the viscera is grossly unremarkable  demonstrating normal caliber bowel loops. No evidence of free  fluid or free intraperitoneal air.  No jaime fracture. Vertebral body compression deformities of L2,  and to a lesser extent at T12. The T12 level may be associated  with a transverse fracture, nondisplaced.    Sclerotic centimeter size bone lesion involving L4, likely  benign.    RETROPERITONEUM:  Limited assessment of the kidneys demonstrates overall normal  size.  Limited assessment of the ureters demonstrates normal caliber and  course.  The adrenal glands are of normal size and contour.  No gross evidence of significant retroperitoneal adenopathy.  The vascular structures are grossly within normal limits for age.     PELVIS:   Limited assessment of the viscera is grossly unremarkable  demonstrating normal caliber bowel loops. No evidence of free  fluid or free intraperitoneal air.  There is a minimally displaced fracture of the inferior pubic  ramus on the left.  The vascular structures are grossly within normal limits for age.     .       Impression:        CONCLUSION:  1. Limited examination due to the lack of intravenous contrast.  2. No gross evidence of vascular or solid organ injury,  examination limited in that regard lacking intravenous contrast,  which is recommended for trauma assessments.  3. There is a minimally displaced fracture involving the left  inferior pubic ramus.  4. Possible acute  transverse fracture involving T12 which is  nondisplaced.  5. Vertebral body compression deformity of L2, time course  uncertain.      Electronically signed by:  YULIYA Soriano MD  12/13/2017 12:00  PM [facility] Workstation: [id number]            Assessment/Plan:  1. Left pubic ramus fracture, transverse T 12 fracture, L2 compression deformity s/p fall: Ortho consulted, Pain control.    2. HTN: Atenolol, HCTZ, Lisinopril  3. HLD: Simvastatin  4. Type 2 DM: Glipizide, Metformin, FSBS AC and HS with SSI, continue home meds.     Further orders and the patient will depend upon the hospital course.  Plan of care as been discussed with the patient and her daughter visited bedside.  CODE STATUS is been confirmed with the patient and she wishes to be full code.          This document has been electronically signed by DEANNA Terrell on December 13, 2017 2:09 PM                     Electronically signed by Perico Gardiner MD at 12/13/2017 10:16 PM           Physician Progress Notes (most recent note)      DEANNA Cordova at 12/17/2017 10:30 AM  Version 1 of 1    Attestation signed by Bebo Galicia MD at 12/17/2017  7:31 PM        I personally evaluated and examined the patient in conjunction with FREDDIE France and agree with the assessment, treatment plan, and disposition of the patient as recorded.    General - awake alert  Respiratory - normal effort, no tachypnea                                       [facility] Medicine Services  INPATIENT PROGRESS NOTE    Length of Stay: 2  Date of Admission: 12/13/2017  Primary Care Physician: Troy Cheng MD    Subjective   Chief Complaint: Fall, back pain  HPI:  77 year old female with a history of DM II, HTN, and osteoporosis who suffered a fall and sustained fractures to her left pubic ramus, L2, and T12.  She was evaluated by spine/orthopedics who recommend brace and pain control, no surgical management.   Brace has been fitted and she is working with therapy.  She reports pain is still severe but mobility improved with brace. Family and patient express safety concerns with returning home currently.      Review of Systems   Constitutional: Negative for chills and fever.   Respiratory: Negative for shortness of breath.    Cardiovascular: Negative for chest pain and palpitations.   Gastrointestinal: Negative for abdominal pain, diarrhea, nausea and vomiting.   Musculoskeletal: Positive for arthralgias and back pain.      All pertinent negatives and positives are as above. All other systems have been reviewed and are negative unless otherwise stated.     Objective    Temp:  [97.5 °F (36.4 °C)-98.9 °F (37.2 °C)] 98.9 °F (37.2 °C)  Heart Rate:  [83-94] 85  Resp:  [18] 18  BP: ()/(50-58) 121/58    Physical Exam   Constitutional: She is oriented to person, place, and time. She appears well-developed and well-nourished.   HENT:   Head: Normocephalic and atraumatic.   Cardiovascular: Normal rate, regular rhythm, normal heart sounds and intact distal pulses.    Pulmonary/Chest: Effort normal and breath sounds normal. No respiratory distress.   Abdominal: Soft. Bowel sounds are normal. She exhibits no distension. There is no tenderness.   Musculoskeletal: Normal range of motion. She exhibits no edema or deformity.   Neurological: She is alert and oriented to person, place, and time. She exhibits normal muscle tone.   Skin: Skin is warm and dry. No erythema.   Vitals reviewed.    Results Review:  I have reviewed the labs, radiology results, and diagnostic studies.    Laboratory Data:     Results from last 7 days  Lab Units 12/15/17  0541 12/14/17  0546   SODIUM mmol/L 133* 132*   POTASSIUM mmol/L 3.8 3.5   CHLORIDE mmol/L 96 97   CO2 mmol/L 25.0 24.0   BUN mg/dL 20 17   CREATININE mg/dL 0.69 0.60   GLUCOSE mg/dL 147* 181*   CALCIUM mg/dL 8.7 9.0   ANION GAP mmol/L 12.0 11.0     Estimated Creatinine Clearance: 56 mL/min (by  C-G formula based on Cr of 0.69).            Results from last 7 days  Lab Units 12/15/17  0541 12/14/17  0546   WBC 10*3/mm3 10.42* 8.42   HEMOGLOBIN g/dL 11.5* 11.0*   HEMATOCRIT % 34.9* 33.4*   PLATELETS 10*3/mm3 172 164           Culture Data:   No results found for: BLOODCX  No results found for: URINECX  No results found for: RESPCX  No results found for: WOUNDCX  No results found for: STOOLCX  No components found for: BODYFLD    Radiology Data:   Imaging Results (last 24 hours)     ** No results found for the last 24 hours. **          I have reviewed the patient current medications.     Assessment/Plan     Hospital Problem List     * (Principal)Closed T12 spinal fracture    Closed fracture of ramus of left pubis    Closed fracture of second lumbar vertebra    HTN (hypertension)    Type 2 diabetes mellitus          Plan:    TLSO  Pain control: PRN norco, PRN morphine  PT/OT  BP control with atenolol, lisinopril, and HCTZ  Glucose control with metformin/glipizide/SSI  Discharge planning, needs further rehab, consider ARU, will discuss with case management        This document has been electronically signed by DEANNA Cordova on December 17, 2017 10:30 AM         Electronically signed by Bebo Galicia MD at 12/17/2017  7:31 PM           Consult Notes (most recent note)      Cristofer Bauman MD at 12/13/2017  5:35 PM  Version 1 of 1     Consult Orders:    1. Inpatient Consult to Orthopedic Surgery [[id number]] ordered by DEANNA Terrell at 12/13/17 1409                Chief Complaint   Patient presents with   • Fall     76 y/o fell at home, complains of pain in the back and hip.  Aching pain, sudden onset. This pain is constant.  The pain does not radiate, the pain is aching, constant. the pain is 6/10.  Fell at home, she fell on steps.  Review of system- see the primary dr examination/documentation  Past Medical History:   Diagnosis Date   • Cancer    • Diabetes mellitus    •  Hypertension    • Irregular heart beat           Current Facility-Administered Medications:   •  acetaminophen (TYLENOL) tablet 650 mg, 650 mg, Oral, Q4H PRN, DEANNA Terrell  •  aspirin chewable tablet 81 mg, 81 mg, Oral, Daily, Sierra Salazar APRN, 81 mg at 12/13/17 1632  •  [START ON 12/14/2017] atenolol (TENORMIN) half tablet 12.5 mg, 12.5 mg, Oral, Daily, Sierra Salazar APRN  •  atorvastatin (LIPITOR) tablet 20 mg, 20 mg, Oral, Daily, Sierra Salazar APRN, 20 mg at 12/13/17 1628  •  glipiZIDE (GLUCOTROL) tablet 5 mg, 5 mg, Oral, BID AC, Sierra Salazar APRN, 5 mg at 12/13/17 1729  •  hydrochlorothiazide (MICROZIDE) capsule 12.5 mg, 12.5 mg, Oral, Daily, Perico Gardiner MD, 12.5 mg at 12/13/17 1628  •  HYDROcodone-acetaminophen (NORCO) 5-325 MG per tablet 1 tablet, 1 tablet, Oral, Q4H PRN, Sierra Salazar APRN, 1 tablet at 12/13/17 1502  •  insulin aspart (novoLOG) injection 0-9 Units, 0-9 Units, Subcutaneous, 4x Daily AC & at Bedtime, Sierra Salazar APRN, 4 Units at 12/13/17 1730  •  lisinopril (PRINIVIL,ZESTRIL) tablet 2.5 mg, 2.5 mg, Oral, Daily, Sierra Salazar APRN, 2.5 mg at 12/13/17 1630  •  metFORMIN (GLUCOPHAGE) tablet 1,000 mg, 1,000 mg, Oral, BID With Meals, Sierra Salazar APRN, 1,000 mg at 12/13/17 1729  •  Morphine injection 1 mg, 1 mg, Intravenous, Q4H PRN **AND** naloxone (NARCAN) injection 0.4 mg, 0.4 mg, Intravenous, Q5 Min PRN, Sierra Salazar APRN  •  ondansetron (ZOFRAN) tablet 4 mg, 4 mg, Oral, Q6H PRN **OR** ondansetron ODT (ZOFRAN-ODT) disintegrating tablet 4 mg, 4 mg, Oral, Q6H PRN **OR** ondansetron (ZOFRAN) injection 4 mg, 4 mg, Intravenous, Q6H PRN, DEANNA Terrell  •  [START ON 12/14/2017] pantoprazole (PROTONIX) EC tablet 40 mg, 40 mg, Oral, QAM, DEANNA Terrell   Past Surgical History:   Procedure Laterality Date   • ANKLE SURGERY     • CHOLECYSTECTOMY       Social History     Social History   • Marital status:      Spouse name: N/A   • Number of children: N/A   • Years  "of education: N/A     Occupational History   • Not on file.     Social History Main Topics   • Smoking status: Former Smoker   • Smokeless tobacco: Not on file   • Alcohol use No   • Drug use: No   • Sexual activity: Not on file     Other Topics Concern   • Not on file     Social History Narrative   • No narrative on file   History reviewed. No pertinent family history.   History reviewed. No pertinent family history.   Allergies   Allergen Reactions   • Niacin And Related    • Other      Allergic to \"all mycins\"     Vitals:    12/13/17 1628   BP: 138/74   Pulse: 107   Resp:    Temp:    SpO2:        Alert  + pain of the back region  + pain passive motion of the hip.  Pulse present, all extremities.  No neck tenderness      Lab Results (last 24 hours)     Procedure Component Value Units Date/Time    POC Glucose Once [[id number]]  (Abnormal) Collected:  12/13/17 1642    Specimen:  Blood Updated:  12/13/17 1657     Glucose 224 (H) mg/dL       RN NotifiedMeter: [id number]: [id number] ALIZA LAWSON           Imaging Results (last 24 hours)     Procedure Component Value Units Date/Time    CT Head Without Contrast [[id number]] Collected:  12/13/17 1137     Updated:  12/13/17 1150    Narrative:       .      EXAMINATION:  Computed Tomography      REGION:  Head             INDICATION:  injury    HISTORY:  CORRELATIVE IMAGING:    none    TECHNIQUE:  iv contrast:  no    This exam was performed according to the departmental  dose-optimization program which includes automated exposure  control, adjustment of the mA and/or kV according to patient size  and/or use of iterative reconstruction technique.              COMMENTS:                - atrophy: wnl for age       - cortex:                wnl for age      - deep white mat: wnl for age       - hemorrhage:        none       - fluid collection:  no intra/extra axial fluid collection     - mass / lesion:     no focal parenchymal lesion(s)       - gray/white jxn:  borders " preserved         - brain stem:        wnl       - cerebellum:       wnl       - globes / retro:    wnl       - ventricles:         normal size / configuration       - midline shift:     no       - sinuses:             wnl       - mastoids:           wnl        - osseous:             wnl       - misc.:    .       Impression:       CONCLUSION:  1.  Negative examination for acute intracranial pathology.          Electronically signed by:  YULIYA Soriano MD  12/13/2017 11:49  AM CST Workstation: [id number]    CT Abdomen Pelvis Without Contrast [[id number]] Collected:  12/13/17 1132     Updated:  12/13/17 1201    Narrative:         EXAMINATION:  Computed Tomography           REGION:    Chest / Abdomen / Pelvis                     INDICATION:     injury    HISTORY:  ZANDRA. IMAGING:    none            TECHNIQUE:      - reconstructions:    axial, coronal, sagittal         - contrast:      oral:  no ;   intravenous:  no     - Please note:      - Lack of IV contrast limits assessment of solid organ  parenchyma, urinary system, or vascular structures.      - Lack of oral contrast limits assessment of GI tract  structures or peritoneal disease.    This exam was performed according to the departmental  dose-optimization program which includes automated exposure  control, adjustment of the mA and/or kV according to patient size  and/or use of iterative reconstruction technique.          COMMENTS:           PULMONARY PARENCHYMA:         The air spaces are grossly unremarkable.    The pulmonary interstitium are grossly within normal limits for  age.         There are no pulmonary nodules or mass.                 MEDIASTINUM / JUAN J:         The heart is of normal size and there is no pericardial fluid.     The aorta and great vessels are of normal caliber and  configuration for age.     No mediastinal mass or significant adenopathy.             PLEURAL COMPARTMENT:       There is no pleural fluid or air.                MISC:        The inferior neck is negative.     The osseous and body wall are negative.           ABDOMEN:    Limited assessment of the solid organ parenchyma is grossly  unremarkable demonstrating no evidence of organomegaly.  Status post cholecystectomy  Limited assessment of the viscera is grossly unremarkable  demonstrating normal caliber bowel loops. No evidence of free  fluid or free intraperitoneal air.  No jaime fracture. Vertebral body compression deformities of L2,  and to a lesser extent at T12. The T12 level may be associated  with a transverse fracture, nondisplaced.    Sclerotic centimeter size bone lesion involving L4, likely  benign.    RETROPERITONEUM:  Limited assessment of the kidneys demonstrates overall normal  size.  Limited assessment of the ureters demonstrates normal caliber and  course.  The adrenal glands are of normal size and contour.  No gross evidence of significant retroperitoneal adenopathy.  The vascular structures are grossly within normal limits for age.     PELVIS:   Limited assessment of the viscera is grossly unremarkable  demonstrating normal caliber bowel loops. No evidence of free  fluid or free intraperitoneal air.  There is a minimally displaced fracture of the inferior pubic  ramus on the left.  The vascular structures are grossly within normal limits for age.     .       Impression:        CONCLUSION:  1. Limited examination due to the lack of intravenous contrast.  2. No gross evidence of vascular or solid organ injury,  examination limited in that regard lacking intravenous contrast,  which is recommended for trauma assessments.  3. There is a minimally displaced fracture involving the left  inferior pubic ramus.  4. Possible acute transverse fracture involving T12 which is  nondisplaced.  5. Vertebral body compression deformity of L2, time course  uncertain.      Electronically signed by:  YULIYA Soriano MD  12/13/2017 12:00  PM CST Workstation: [id number]    CT Chest Without  Contrast [[id number]] Collected:  12/13/17 1132     Updated:  12/13/17 1201    Narrative:         EXAMINATION:  Computed Tomography           REGION:    Chest / Abdomen / Pelvis                     INDICATION:     injury    HISTORY:  ZANDRA. IMAGING:    none            TECHNIQUE:      - reconstructions:    axial, coronal, sagittal         - contrast:      oral:  no ;   intravenous:  no     - Please note:      - Lack of IV contrast limits assessment of solid organ  parenchyma, urinary system, or vascular structures.      - Lack of oral contrast limits assessment of GI tract  structures or peritoneal disease.    This exam was performed according to the departmental  dose-optimization program which includes automated exposure  control, adjustment of the mA and/or kV according to patient size  and/or use of iterative reconstruction technique.          COMMENTS:           PULMONARY PARENCHYMA:         The air spaces are grossly unremarkable.    The pulmonary interstitium are grossly within normal limits for  age.         There are no pulmonary nodules or mass.                 MEDIASTINUM / JUAN J:         The heart is of normal size and there is no pericardial fluid.     The aorta and great vessels are of normal caliber and  configuration for age.     No mediastinal mass or significant adenopathy.             PLEURAL COMPARTMENT:       There is no pleural fluid or air.                MISC:       The inferior neck is negative.     The osseous and body wall are negative.           ABDOMEN:    Limited assessment of the solid organ parenchyma is grossly  unremarkable demonstrating no evidence of organomegaly.  Status post cholecystectomy  Limited assessment of the viscera is grossly unremarkable  demonstrating normal caliber bowel loops. No evidence of free  fluid or free intraperitoneal air.  No jaime fracture. Vertebral body compression deformities of L2,  and to a lesser extent at T12. The T12 level may be associated  with a  transverse fracture, nondisplaced.    Sclerotic centimeter size bone lesion involving L4, likely  benign.    RETROPERITONEUM:  Limited assessment of the kidneys demonstrates overall normal  size.  Limited assessment of the ureters demonstrates normal caliber and  course.  The adrenal glands are of normal size and contour.  No gross evidence of significant retroperitoneal adenopathy.  The vascular structures are grossly within normal limits for age.     PELVIS:   Limited assessment of the viscera is grossly unremarkable  demonstrating normal caliber bowel loops. No evidence of free  fluid or free intraperitoneal air.  There is a minimally displaced fracture of the inferior pubic  ramus on the left.  The vascular structures are grossly within normal limits for age.     .       Impression:        CONCLUSION:  1. Limited examination due to the lack of intravenous contrast.  2. No gross evidence of vascular or solid organ injury,  examination limited in that regard lacking intravenous contrast,  which is recommended for trauma assessments.  3. There is a minimally displaced fracture involving the left  inferior pubic ramus.  4. Possible acute transverse fracture involving T12 which is  nondisplaced.  5. Vertebral body compression deformity of L2, time course  uncertain.      Electronically signed by:  YULIYA Soriano MD  12/13/2017 12:00  PM CST Workstation: [id number]        CT- fracture T12 and L2, and pelvic ring fracture.    Will get a spinal brace ordered for her to ambulate.  No surgical treatment, the skeletal injuries are best to run the natural course of healing.       Electronically signed by Cristofer Bauman MD at 12/13/2017  5:42 PM           Physical Therapy Notes (most recent note)      Stephane Anderson, LILLIAN at 12/18/2017 12:50 PM  Version 1 of 1         Problem: Inpatient Physical Therapy  Goal: Transfer Training Goal 1 STG- PT  Outcome: Outcome(s) achieved Date Met:  12/18/17    12/15/17 5729  17 0815   Transfer Training PT STG   Transfer Training PT STG, Date Established 12/15/17 --    Transfer Training PT STG, Activity Type bed to chair /chair to bed;sit to stand/stand to sit --    Transfer Training PT STG, [place] Level contact guard assist --    Transfer Training PT STG, Assist Device walker, rolling --    Transfer Training PT STG, Date Goal Reviewed --  17   Transfer Training PT STG, Outcome --  goal met              Electronically signed by Stephane Anderson PTA at 2017 12:50 PM           Occupational Therapy Notes (most recent note)      SHIRLENE Sumner/L at 2017 11:48 AM  Version 1 of 1         Acute Care - Occupational Therapy Treatment Note  [facility]     Patient Name: Susanna Carr  : 1940  MRN: [id number]  Today's Date: 2017  Onset of Illness/Injury or Date of Surgery Date: 17  Date of Referral to OT: 12/15/17  Referring Physician: Puneet HUERTA      Admit Date: 2017    Visit Dx:     ICD-10-CM ICD-9-CM   1. Pubic ramus fracture, left, closed, initial encounter S32.592A 808.2   2. Other closed fracture of twelfth thoracic vertebra, initial encounter S22.088A 805.2   3. Closed compression fracture of second lumbar vertebra, initial encounter S32.020A 805.4   4. Impaired physical mobility Z74.09 781.99   5. Impaired gait and mobility R26.89 781.2   6. Impaired mobility and ADLs Z74.09 799.89     Patient Active Problem List   Diagnosis   • Closed fracture of ramus of left pubis   • Pathological fracture of vertebra, initial encounter   • Closed T12 spinal fracture   • Closed fracture of second lumbar vertebra   • HTN (hypertension)   • Type 2 diabetes mellitus             Adult Rehabilitation Note       17 0956 17 1010 17 0840    Rehab Assessment/Intervention    Discipline occupational therapy assistant  -CS,PG,CS2 physical therapy assistant  -TW occupational therapy assistant  -RC    Document Type  therapy note (daily note)  -CS,PG,CS2 therapy note (daily note)  -TW therapy note (daily note)  -RC    Subjective Information agree to therapy;complains of;pain  -CS,PG,CS2 agree to therapy  -TW agree to therapy  -RC    Patient Effort, Rehab Treatment good  -CS,PG,CS2 good  -TW good  -RC    Symptoms Noted During/After Treatment  fatigue  -TW     Precautions/Limitations fall precautions;spinal precautions  -CS,PG,CS2 fall precautions;spinal precautions   Pt not able to maintain PWB through L LE for entire tx.  -TW non-weight bearing status;fall precautions;spinal precautions;brace on when up  -RC    Recorded by [CS,PG,CS2] SHIRLENE Sumner/YOSELIN (r) Sharonda Méndez, OT Student (t) JARED Sumner (c) [TW] Sergio Jimenez PTA [RC] SHIRLENE López/L    Vital Signs    Pre Systolic BP Rehab 137  -CS,PG,CS2      Pre Treatment Diastolic BP 69  -CS,PG,CS2      Post Systolic BP Rehab 124  -CS,PG,CS2  129  -RC    Post Treatment Diastolic BP 76  -CS,PG,CS2  66  -RC    Pretreatment Heart Rate (beats/min) 94  -CS,PG,CS2 97  -TW     Posttreatment Heart Rate (beats/min) 93  -CS,PG,CS2 98  -TW 98  -RC    Pre SpO2 (%) 92  -CS,PG,CS2 94  -TW     O2 Delivery Pre Treatment room air  -CS,PG,CS2 room air  -TW     Intra SpO2 (%)  98  -TW     Post SpO2 (%) 92  -CS,PG,CS2 95  -TW 93  -RC    O2 Delivery Post Treatment room air  -CS,PG,CS2  room air  -RC    Pre Patient Position Sitting  -CS,PG,CS2 Supine  -TW     Intra Patient Position Sitting  -CS,PG,CS2 Standing  -TW     Post Patient Position Sitting  -CS,PG,CS2 Supine  -TW     Recorded by [CS,PG,CS2] SHIRLENE Sumner/YOSELIN (r) Sharonda Méndez, OT Student (t) JARED Sumner (c) [TW] Sergio Jimenez PTA [RC] SHIRLENE López/YOSELIN    Pain Assessment    Pain Assessment 0-10  -CS,PG,CS2 0-10  -TW     Pain Score 3  -CS,PG,CS2 3  -TW 3  -RC    Post Pain Score 3  -CS,PG,CS2 3  -TW 3  -RC    Pain Type Acute pain;Surgical pain  -CS,PG,CS2 Acute pain;Surgical  pain  -TW Acute pain  -RC    Pain Location Back  -CS,PG,CS2 Back  -TW Back  -RC    Pain Orientation Lower  -CS,PG,CS2 Lower  -TW Lower  -RC    Pain Intervention(s)   Medication (See MAR);Repositioned  -RC    Recorded by [CS,PG,CS2] SHIVA SumnerA/L (r) Sharonda Méndez OT Student (t) SHIRLENE Sumner/YOSELIN (c) [TW] Sergio Jimenez PTA [RC] SHIVA LópezA/L    Vision Assessment/Intervention    Visual Impairment WFL with corrective lenses  -CS,PG,CS2      Recorded by [CS,PG,CS2] SHIVA SumnerA/YOSELIN (r) Sharonda Méndez, OT Student (t) SHIRLENE Sumner/L (c)      Cognitive Assessment/Intervention    Current Cognitive/Communication Assessment functional  -CS,PG,CS2 functional  -TW functional  -RC    Orientation Status oriented x 4  -CS,PG,CS2 oriented x 4  -TW oriented x 4  -RC    Follows Commands/Answers Questions 100% of the time  -CS,PG,CS2 100% of the time  -TW     Personal Safety WNL/WFL  -CS,PG,CS2      Personal Safety Interventions fall prevention program maintained  -CS,PG,CS2 fall prevention program maintained;gait belt;nonskid shoes/slippers when out of bed  -TW     Recorded by [CS,PG,CS2] SHIVA SumnerA/YOSELIN (r) Sharonda Méndez, OT Student (t) SHIRLENE Sumner/YOSELIN (c) [TW] Sergio Jimenez PTA [RC] SHIVA LópezA/L    Mobility Assessment/Training    Extremity Weight-Bearing Status left lower extremity  -CS,PG,CS2 left lower extremity  -TW     Left Lower Extremity Weight-Bearing partial weight-bearing  -CS,PG,CS2 partial weight-bearing  -TW     Recorded by [CS,PG,CS2] SHIRLENE Sumner/YOSELIN (r) Sharonda Méndez OT Student (t) SHIRLENE Sumner/YOSELIN (c) [TW] Sergio Jimenez PTA     Bed Mobility, Assessment/Treatment    Bed Mobility, Roll Left, [place]  minimum assist (75% patient effort)  -TW     Bed Mobility, Roll Right, [place]  minimum assist (75% patient effort)  -TW     Bed Mob, Supine to Sit, [place]  moderate assist (50% patient  effort)  -TW     Bed Mob, Sit to Supine, [place]  moderate assist (50% patient effort)  -TW maximum assist (25% patient effort)  -RC    Bed Mob, Sidelying to Sit, [place]  moderate assist (50% patient effort)  -TW     Bed Mobility, Comment  Spouse assisted PTA to misael/natalia TLSO  -TW     Recorded by  [TW] Sergio Jimenez PTA [RC] SHIVA LópezA/L    Transfer Assessment/Treatment    Transfers, Chair-Bed [place]   minimum assist (75% patient effort);2 person assist required  -RC    Transfers, Bed-Chair-Bed, Assist Device   rolling walker  -RC    Transfers, Sit-Stand [place]  minimum assist (75% patient effort)  -TW minimum assist (75% patient effort)  -RC    Transfers, Stand-Sit [place]  minimum assist (75% patient effort)  -TW minimum assist (75% patient effort);2 person assist required  -RC    Transfers, Sit-Stand-Sit, Assist Device  rolling walker  -TW rolling walker  -RC    Toilet Transfer, [place]  minimum assist (75% patient effort)  -TW minimum assist (75% patient effort);2 person assist required  -RC    Toilet Transfer, Assistive Device  rolling walker  -TW rolling walker  -RC    Recorded by  [TW] Sergio Jimenez PTA [RC] SIHVA LópezA/L    Gait Assessment/Treatment    Gait, [place] Level  minimum assist (75% patient effort)  -TW     Gait, Assistive Device  rolling walker  -TW     Gait, Distance (Feet)  8   x2 trials to BSC and back to bed after completing duties.  -TW     Recorded by  [TW] Sergio Jimenez PTA     Functional Mobility    Functional Mobility- Ind. Level   minimum assist (75% patient effort);2 person assist required  -    Functional Mobility- Device   rolling walker  -RC    Functional Mobility-Distance (Feet)   1  -    Functional Mobility-Maintain WBing   unable to maintain weight bearing status  -RC    Recorded by   [RC] SHIVA LópezA/L    ADL Assessment/Intervention    Additional Documentation   --   declined bath  -     Recorded by   [RC] JARED López    Lower Body Dressing Assessment/Training    LB Dressing Assess/Train, Clothing Type doffing:;donning:;slipper socks  -CS      LB Dressing Assess/Train, Assist Device dressing stick;reacher;sock-aid  -CS      LB Dressing Assess/Train, Position sitting  -CS      LB Dressing Assess/Train, [place] set up required;supervision required  -CS      Recorded by [CS] JARED Sumner      Toileting Assessment/Training    Toileting Assess/Train, Assistive Device   bedside commode  -RC    Toileting Assess/Train, Position   sitting;standing  -RC    Toileting Assess/Train, [place]ep[place] Level   minimum assist (75% patient effort)  -RC    Recorded by   [RC] JARED López    Grooming Assessment/Training    Grooming Assess/Train, Position   sitting  -RC    Grooming Assess/Train, [place] Level   minimum assist (75% patient effort)  -RC    Grooming Assess/Train, Impairments   ROM decreased  -RC    Recorded by   [RC] JARED López    Therapy Exercises    Bilateral Upper Extremity AROM:;20 reps;sitting;elbow flexion/extension;pronation/supination;shoulder extension/flexion;shoulder ER/IR  -CS,PG,CS2      BUE Resistance manual resistance- minimal  -CS,PG,CS2      Recorded by [CS,PG,CS2] JARED Sumner (r) Sharonda Méndez, OT Student (t) JARED Sumner (c)      Positioning and Restraints    Pre-Treatment Position sitting in chair/recliner  -CS,PG,CS2 in bed  -TW sitting in chair/recliner  -RC    Post Treatment Position chair  -CS,PG,CS2 bed  -TW bed  -RC    In Bed sitting;call light within reach;encouraged to call for assist;with family/caregiver  -CS,PG,CS2 supine;call light within reach;encouraged to call for assist;with family/caregiver  -TW supine;call light within reach;encouraged to call for assist;with family/caregiver  -RC    Recorded by [CS,PG,CS2] JARED Sumner (r) Sharonda Méndez, OT Student (t) JARED Sumner (valentín)  [TW] Sergio Jimenez PTA [RC] Melanie Amanda, GARBER/YOSELIN      17 0900          Rehab Assessment/Intervention    Discipline physical therapy assistant  -LN      Document Type therapy note (daily note)  -LN      Subjective Information agree to therapy;complains of;weakness;pain  -LN      Precautions/Limitations fall precautions;spinal precautions   tlso on when up pwb l le  -LN      Recorded by [LN] Mary Petit PTA      Vital Signs    Pre Systolic BP Rehab 128  -LN      Pre Treatment Diastolic BP 63  -LN      Post Systolic BP Rehab 121  -LN      Post Treatment Diastolic BP 60  -LN      Pretreatment Heart Rate (beats/min) 102  -LN      Posttreatment Heart Rate (beats/min) 100  -LN      Pre SpO2 (%) 93  -LN      O2 Delivery Pre Treatment room air  -LN      Post SpO2 (%) 91  -LN      Pre Patient Position Supine  -LN      Intra Patient Position Standing  -LN      Post Patient Position Sitting  -LN      Recorded by [LN] Mary Petit PTA      Pain Assessment    Pain Assessment 0-10  -LN      Pain Score 0  -LN      Post Pain Score 9  -LN      Pain Type Acute pain;Surgical pain  -LN      Pain Location Back  -LN      Pain Orientation Lower   left hip  -LN      Pain Intervention(s) Medication (See MAR)  -LN      Recorded by [LN] Mary Petit PTA      Cognitive Assessment/Intervention    Orientation Status oriented to;person     -LN      Recorded by [LN] Mary Petit PTA      Mobility Assessment/Training    Extremity Weight-Bearing Status left lower extremity  -LN      Left Lower Extremity Weight-Bearing partial weight-bearing  -LN      Recorded by [LN] Mary Petit PTA      Bed Mobility, Assessment/Treatment    Bed Mobility, Assistive Device --   bed flat hr or mattress  -LN      Bed Mobility, Roll Left, [place] minimum assist (75% patient effort)  -LN      Bed Mobility, Roll Right, [place] minimum assist (75% patient effort)  -LN      Bed Mobility, Scoot/Bridge, [place] not tested  -LN      Bed  Mob, Supine to Sit, [place] moderate assist (50% patient effort)  -LN      Bed Mob, Sit to Supine, [place] not tested  -LN      Bed Mob, Sidelying to Sit, [place] moderate assist (50% patient effort)  -LN      Bed Mobility, Comment spouse assisted with putting on tlso and with t/f's requiring v.c's  -LN      Recorded by [LN] Mray Petit, PTA      Transfer Assessment/Treatment    Transfers, Bed-Chair [place] minimum assist (75% patient effort)  -LN      Transfers, Chair-Bed [place] not tested  -LN      Transfers, Bed-Chair-Bed, Assist Device rolling walker  -LN      Transfers, Sit-Stand [place] minimum assist (75% patient effort);verbal cues required  -LN      Transfers, Stand-Sit [place] minimum assist (75% patient effort)  -LN      Transfers, Sit-Stand-Sit, Assist Device rolling walker  -LN      Toilet Transfer, [place] not tested  -LN      Transfer, Comment spouse assisted with gt and able to instruct pt on correct sequencing  -LN      Recorded by [LN] Mary Petit, PTA      Gait Assessment/Treatment    Gait, [place] Level minimum assist (75% patient effort)  -LN      Gait, Assistive Device rolling walker  -LN      Gait, Distance (Feet) 10  -LN      Gait, Comment as pt fatigued unable to maintain pwb left  -LN      Recorded by [LN] Mary Petit, PTA      Orthosis Location    Orthosis Location/Type neck/back  -LN      Orthosis, Neck/Back TLSO (thoracic lumbar sacral orthosis)  -LN      Recorded by [LN] Mary Petit, PTA      Positioning and Restraints    Post Treatment Position chair  -LN      In Chair notified nsg;sitting;encouraged to call for assist;with family/caregiver  -LN      Recorded by [LN] Mary Petit, PTA        User Key  (r) = Recorded By, (t) = Taken By, (c) = Cosigned By    Initials Name Effective Dates    LN Mary Petit, PTA 10/17/16 -     TW Sergio Jimenez, LILLIAN 10/17/16 -     SHIVA NolascoA/L 10/17/16 -     CS Liza Castro, GARBER/L  10/17/16 -     PG Sharonda Méndez, OT Student 01/31/17 -                 OT Goals       12/18/17 1113 12/17/17 1307 12/16/17 1029    Bed Mobility OT LTG    Bed Mobility OT LTG, Date Established   12/16/17  -    Bed Mobility OT LTG, Time to Achieve   by discharge  -    Bed Mobility OT LTG, Activity Type   all bed mobility  -    Bed Mobility OT LTG, [place] Level   supervision required   to engage in ADL  -    Bed Mobility OT LTG, Date Goal Reviewed 12/18/17  -CS (r) PG (t) CS (c) 12/17/17  -     Bed Mobility OT LTG, Outcome  goal ongoing  -     Transfer Training OT LTG    Transfer Training OT LTG, Date Established   12/16/17  -    Transfer Training OT LTG, Time to Achieve   by discharge  -    Transfer Training OT LTG, Activity Type   toilet  -    Transfer Training OT LTG, [place] Level   supervision required  -    Transfer Training OT LTG, Date Goal Reviewed 12/18/17  -CS (r) PG (t) CS (c) 12/17/17  -     Transfer Training OT LTG, Outcome  goal ongoing  -     Caregiver Training OT LTG    Caregiver Training OT LTG, Date Established   12/16/17  -    Caregiver Training OT LTG, Time to Achieve   by discharge  -    Caregiver Training OT LTG, Activity Type   with home and ADL safety and orthotic management  -    Caregiver Training OT LTG, [place] Level   able to assist adequately;able to cue patient adequately  -    Caregiver Training OT LTG, Date Goal Reviewed 12/18/17  -CS (r) PG (t) CS (c) 12/17/17  -     Caregiver Training OT LTG, Outcome  goal ongoing  -     ADL OT LTG    ADL OT LTG, Date Established   12/16/17  -    ADL OT LTG, Time to Achieve   by discharge  -    ADL OT LTG, Activity Type   ADL skills  -    ADL OT LTG, Additional Goal   set up feeding/grooming/toileting - min A with UB bath/dressing/brace application and LB dressing/bath.  AE/AD as needed.  -    ADL OT LTG, Date Goal Reviewed 12/18/17  -CS (r) PG (t) CS (c) 12/17/17  -     ADL OT LTG,  Outcome  goal ongoing  -     Functional Mobility OT LTG    Functional Mobility Goal OT LTG, Date Established   12/16/17  -    Functional Mobility Goal OT LTG, Time to Achieve   by discharge  -    Functional Mobility Goal OT LTG, Jessamine Level   standby assist  -    Functional Mobility Goal OT LTG, Assist Device   rolling walker  -    Functional Mobility Goal OT LTG, Distance to Achieve   to the sink;to the bathroom   within precuations/weight bear status  -    Functional Mobility Goal OT LTG, Date Goal Reviewed 12/18/17  -CS (r) PG (t) CS (c) 12/17/17  -     Functional Mobility Goal OT LTG, Outcome  goal ongoing  [facility]       User Key  (r) = Recorded By, (t) = Taken By, (c) = Cosigned By    Initials Name Provider Type     Yakelin Garber, OTR/L Occupational Therapist     Melanie Amanda, GARBER/L Occupational Therapy Assistant    CS Liza Castro, GARBER/L Occupational Therapy Assistant     Sharonda Méndez, OT Student OT Student          Occupational Therapy Education     Title: PT OT SLP Therapies (Active)     Topic: Occupational Therapy (Done)     Point: ADL training (Done)    Description: Instruct learner(s) on proper safety adaptation and remediation techniques during self care or transfers.   Instruct in proper use of assistive devices.    Learning Progress Summary    Learner Readiness Method Response Comment Documented by Status   Patient Acceptance E,D,TB VU Pt educated on AE and usage.  12/18/17 1115 Done    Acceptance E VU  PG 12/18/17 1112 Done    Acceptance E NR   12/17/17 1307 Active    Acceptance E VU family present part of time. Educated on OT and POC. Educated on log roll, spinal precuations, PWB status, throughout ADL, t/f. Educated on TLSO. Educated need for assist w/ADL. Educated to call for assist.  12/16/17 1331 Done               Point: Home exercise program (Done)    Description: Instruct learner(s) on appropriate technique for monitoring, assisting and/or progressing  therapeutic exercises/activities.    Learning Progress Summary    Learner Readiness Method Response Comment Documented by Status   Patient Acceptance E,D,TB VU Pt educated on AE and usage. PG 12/18/17 1115 Done    Acceptance E VU  PG 12/18/17 1112 Done               Point: Precautions (Done)    Description: Instruct learner(s) on prescribed precautions during self-care and functional transfers.    Learning Progress Summary    Learner Readiness Method Response Comment Documented by Status   Patient Acceptance E,D,TB VU Pt educated on AE and usage. PG 12/18/17 1115 Done    Acceptance E VU  PG 12/18/17 1112 Done    Acceptance E NR   12/17/17 1307 Active    Acceptance E VU family present part of time. Educated on OT and POC. Educated on log roll, spinal precuations, PWB status, throughout ADL, t/f. Educated on TLSO. Educated need for assist w/ADL. Educated to call for assist.  12/16/17 1331 Done               Point: Body mechanics (Done)    Description: Instruct learner(s) on proper positioning and spine alignment during self-care, functional mobility activities and/or exercises.    Learning Progress Summary    Learner Readiness Method Response Comment Documented by Status   Patient Acceptance E,D,TB VU Pt educated on AE and usage. PG 12/18/17 1115 Done    Acceptance E VU  PG 12/18/17 1112 Done    Acceptance E NR   12/17/17 1307 Active    Acceptance E VU family present part of time. Educated on OT and POC. Educated on log roll, spinal precuations, PWB status, throughout ADL, t/f. Educated on TLSO. Educated need for assist w/ADL. Educated to call for assist.  12/16/17 1331 Done                      User Key     Initials Effective Dates Name Provider Type Discipline     10/17/16 -  Yakelin Garber, OSCARR/L Occupational Therapist OT     10/17/16 -  SHIVA LópezA/L Occupational Therapy Assistant OT    PG 01/31/17 -  Sharonda Méndez, OSCAR Student OT Student OT                  OT Recommendation and  Plan  Anticipated Equipment Needs At Discharge: bathing equipment, bedside commode, dressing equipment, gait belt, raised toilet seat, reacher, shower chair  Anticipated Discharge Disposition: home with 24/7 care, home with home health, home with outpatient services, skilled nursing facility, inpatient rehabilitation facility  Planned Therapy Interventions: activity intolerance, adaptive equipment training, ADL retraining, balance training, bed mobility training, energy conservation, fine motor coordination training, motor coordination training, orthotic fitting/training, ROM (Range of Motion), strengthening, transfer training  Therapy Frequency: other (see comments) (3-14x a week)           Outcome Measures       12/18/17 1100 12/17/17 1010 12/17/17 0840    How much help from another person do you currently need...    Turning from your back to your side while in flat bed without using bedrails?  3  -TW     Moving from lying on back to sitting on the side of a flat bed without bedrails?  2  -TW     Moving to and from a bed to a chair (including a wheelchair)?  3  -TW     Standing up from a chair using your arms (e.g., wheelchair, bedside chair)?  3  -TW     Climbing 3-5 steps with a railing?  2  -TW     To walk in hospital room?  3  -TW     AM-PAC 6 Clicks Score  16  -TW     How much help from another is currently needed...    Putting on and taking off regular lower body clothing? 1  -CS (r) PG (t) CS (c)  1  -RC    Bathing (including washing, rinsing, and drying) 2  -CS (r) PG (t) CS (c)  2  -RC    Toileting (which includes using toilet bed pan or urinal) 2  -CS (r) PG (t) CS (c)  2  -RC    Putting on and taking off regular upper body clothing 2  -CS (r) PG (t) CS (c)  2  -RC    Taking care of personal grooming (such as brushing teeth) 3  -CS (r) PG (t) CS (c)  3  -RC    Eating meals 4  -CS (r) PG (t) CS (c)  4  -RC    Score 14  -CS (r) PG (t)  14  -RC    Functional Assessment    Outcome Measure Options  AM-PAC 6  Clicks Basic Mobility (PT)  -TW       12/16/17 1029 12/16/17 0900 12/15/17 1335    How much help from another person do you currently need...    Turning from your back to your side while in flat bed without using bedrails?  3  -LN 2  -CB    Moving from lying on back to sitting on the side of a flat bed without bedrails?  2  -LN 2  -CB    Moving to and from a bed to a chair (including a wheelchair)?  3  -LN 2  -CB    Standing up from a chair using your arms (e.g., wheelchair, bedside chair)?  3  -LN 3  -CB    Climbing 3-5 steps with a railing?  2  -LN 2  -CB    To walk in hospital room?  3  -LN 2  -CB    AM-PAC 6 Clicks Score  16  -LN 13  -CB    How much help from another is currently needed...    Putting on and taking off regular lower body clothing? 1  -BH      Bathing (including washing, rinsing, and drying) 2  -BH      Toileting (which includes using toilet bed pan or urinal) 2  -BH      Putting on and taking off regular upper body clothing 2  -BH      Taking care of personal grooming (such as brushing teeth) 3  -BH      Eating meals 4  -BH      Score 14  -BH      Functional Assessment    Outcome Measure Options AM-PAC 6 Clicks Daily Activity (OT)  -BH AM-PAC 6 Clicks Basic Mobility (PT)  -LN AM-PAC 6 Clicks Basic Mobility (PT)  -CB      User Key  (r) = Recorded By, (t) = Taken By, (c) = Cosigned By    Initials Name Provider Type    CB Jennifer Agarwal, PT Physical Therapist     Yakelin Garber, OTR/L Occupational Therapist    LN Mary Petit PTA Physical Therapy Assistant    JESSICA Jimenez PTA Physical Therapy Assistant    LEE Amanda, GARBER/L Occupational Therapy Assistant    DARLENE Castro, GARBER/L Occupational Therapy Assistant    BELA Méndez, OT Student OT Student           Time Calculation:         Time Calculation- OT       12/18/17 1146          Time Calculation- OT    OT Start Time 0956  -CS      OT Stop Time 1034  -CS      OT Time Calculation (min) 38 min  -CS      Total Timed  Code Minutes- OT 38 minute(s)  -CS      OT Received On 12/18/17  -CS        User Key  (r) = Recorded By, (t) = Taken By, (c) = Cosigned By    Initials Name Provider Type    CS JARED Sumner Occupational Therapy Assistant           Therapy Charges for Today     Code Description Service Date Service Provider Modifiers Qty    84701428651 HC OT SELF CARE/MGMT/TRAIN EA 15 MIN 12/18/2017 JARED Sumner GO 1    [id number] HC OT THER PROC EA 15 MIN 12/18/2017 JARED Sumner GO 2          OT G-codes  OT Professional Judgement Used?: Yes  OT Functional Scales Options: AM-PAC 6 Clicks Daily Activity (OT)  Score: 14  Functional Limitation: Self care  Self Care Current Status (): At least 60 percent but less than 80 percent impaired, limited or restricted  Self Care Goal Status (): At least 20 percent but less than 40 percent impaired, limited or restricted    JARED Sumner  12/18/2017     Electronically signed by JARED Sumner at 12/18/2017 11:48 AM

## 2017-12-18 NOTE — THERAPY TREATMENT NOTE
Acute Care - Physical Therapy Treatment Note  [facility]     Patient Name: Susanna Carr  : 1940  MRN: [id number]  Today's Date: 2017  Onset of Illness/Injury or Date of Surgery Date: 17  Date of Referral to PT: 12/15/17  Referring Physician: Puneet HUERTA    Admit Date: 2017    Visit Dx:    ICD-10-CM ICD-9-CM   1. Pubic ramus fracture, left, closed, initial encounter S32.592A 808.2   2. Other closed fracture of twelfth thoracic vertebra, initial encounter S22.088A 805.2   3. Closed compression fracture of second lumbar vertebra, initial encounter S32.020A 805.4   4. Impaired physical mobility Z74.09 781.99   5. Impaired gait and mobility R26.89 781.2   6. Impaired mobility and ADLs Z74.09 799.89     Patient Active Problem List   Diagnosis   • Closed fracture of ramus of left pubis   • Pathological fracture of vertebra, initial encounter   • Closed T12 spinal fracture   • Closed fracture of second lumbar vertebra   • HTN (hypertension)   • Type 2 diabetes mellitus               Adult Rehabilitation Note       17 0956 17 0815 17 1010    Rehab Assessment/Intervention    Discipline occupational therapy assistant  -CS,PG,CS2 physical therapy assistant  -AM physical therapy assistant  -TW    Document Type therapy note (daily note)  -DARLENE,PG,CS2 therapy note (daily note)  -AM therapy note (daily note)  -TW    Subjective Information agree to therapy;complains of;pain  -DARLENE,PG,CS2 agree to therapy;complains of;pain  -AM agree to therapy  -TW    Patient Effort, Rehab Treatment good  -CS,PG,CS2 good  -AM good  -TW    Symptoms Noted During/After Treatment  none  -AM fatigue  -TW    Precautions/Limitations fall precautions;spinal precautions  -CS,PG,CS2 brace on when up;fall precautions;other (see comments)   PWB LLE  -AM fall precautions;spinal precautions   Pt not able to maintain PWB through L LE for entire tx.  -TW    Equipment Issued to Patient  gait belt  -AM      Recorded by [CS,PG,CS2] JARED Sumner (r) Sharonda Méndez, OT Student (t) JARED Sumner (c) [AM] Stephane Anderson PTA [TW] Sergio Jimenez PTA    Vital Signs    Pre Systolic BP Rehab 137  -CS,PG,CS2 119  -AM     Pre Treatment Diastolic BP 69  -CS,PG,CS2 58  -AM     Post Systolic BP Rehab 124  -CS,PG,CS2 137  -AM     Post Treatment Diastolic BP 76  -CS,PG,CS2 69  -AM     Pretreatment Heart Rate (beats/min) 94  -CS,PG,CS2 103  -AM 97  -TW    Posttreatment Heart Rate (beats/min) 93  -CS,PG,CS2 95  -AM 98  -TW    Pre SpO2 (%) 92  -CS,PG,CS2 95  -AM 94  -TW    O2 Delivery Pre Treatment room air  -CS,PG,CS2 room air  -AM room air  -TW    Intra SpO2 (%)   98  -TW    Post SpO2 (%) 92  -CS,PG,CS2 96  -AM 95  -TW    O2 Delivery Post Treatment room air  -CS,PG,CS2 room air  -AM     Pre Patient Position Sitting  -CS,PG,CS2 Sitting  -AM Supine  -TW    Intra Patient Position Sitting  -CS,PG,CS2 Standing  -AM Standing  -TW    Post Patient Position Sitting  -CS,PG,CS2 Sitting  -AM Supine  -TW    Recorded by [CS,PG,CS2] JARED Sunmer (r) Sharonda Méndez, OT Student (t) JARED Sumner (valentín) [AM] Stephane Anderson PTA [TW] Sergio iJmenez PTA    Pain Assessment    Pain Assessment 0-10  -CS,PG,CS2 0-10  -AM 0-10  -TW    Pain Score 3  -CS,PG,CS2 3  -AM 3  -TW    Post Pain Score 3  -CS,PG,CS2 3  -AM 3  -TW    Pain Type Acute pain;Surgical pain  -CS,PG,CS2 Acute pain  -AM Acute pain;Surgical pain  -TW    Pain Location Back  -CS,PG,CS2 Back  -AM Back  -TW    Pain Orientation Lower  -CS,PG,CS2 Lower  -AM Lower  -TW    Pain Descriptors  Sore  -AM     Pain Frequency  Constant/continuous  -AM     Date Pain First Started  12/13/17  -AM     Clinical Progression  Gradually improving  -AM     Patient's Stated Pain Goal  No pain  -AM     Pain Intervention(s)  Medication (See MAR);Ambulation/increased activity  -AM     Result of Injury  Yes  -AM     Work-Related Injury  No  -AM     Multiple Pain  Sites  No  -AM     Recorded by [CS,PG,CS2] SHIRLENE Sumner/YOSELIN (r) Sharonda Méndez OT Student (t) JARED Sumner (c) [AM] Stephane Anderson PTA [TW] Sergio Jimenez PTA    Vision Assessment/Intervention    Visual Impairment WFL with corrective lenses  -CS,PG,CS2      Recorded by [CS,PG,CS2] SHIRLENE Sumner/YOSELIN (r) Sharonda Méndez, OT Student (t) SHIRLENE Sumner/L (c)      Cognitive Assessment/Intervention    Current Cognitive/Communication Assessment functional  -CS,PG,CS2 functional  -AM functional  -TW    Orientation Status oriented x 4  -CS,PG,CS2 oriented x 4  -AM oriented x 4  -TW    Follows Commands/Answers Questions 100% of the time  -CS,PG,CS2 100% of the time  -% of the time  -TW    Personal Safety WNL/WFL  -CS,PG,CS2      Personal Safety Interventions fall prevention program maintained  -CS,PG,CS2 gait belt;nonskid shoes/slippers when out of bed;supervised activity  -AM fall prevention program maintained;gait belt;nonskid shoes/slippers when out of bed  -TW    Recorded by [CS,PG,CS2] SHIRLENE Sumner/YOSELIN (r) Sharonda Méndez, OT Student (t) JARED Sumner (c) [AM] Stephane Anderson PTA [TW] Sergio Jimenez PTA    ROM (Range of Motion)    General ROM  no range of motion deficits identified  -AM     Recorded by  [AM] Stephane Anderson PTA     Mobility Assessment/Training    Extremity Weight-Bearing Status left lower extremity  -CS,PG,CS2 left lower extremity  -AM left lower extremity  -TW    Left Lower Extremity Weight-Bearing partial weight-bearing  -CS,PG,CS2 partial weight-bearing  -AM partial weight-bearing  -TW    Recorded by [CS,PG,CS2] SHIRLENE Sumner/YOSELIN (r) Sharonda Méndez OT Student (t) JARED Sumner (c) [AM] Stephane Anderson PTA [TW] Sergio Jimenez PTA    Bed Mobility, Assessment/Treatment    Bed Mobility, Roll Left, [employer]  contact guard assist  -AM minimum assist (75% patient effort)  -TW    Bed Mobility, Roll  Right, [place]  contact guard assist  -AM minimum assist (75% patient effort)  -TW    Bed Mobility, Scoot/Bridge, [place]  not tested  -AM     Bed Mob, Supine to Sit, [place]  minimum assist (75% patient effort)  -AM moderate assist (50% patient effort)  -TW    Bed Mob, Sit to Supine, [place]  minimum assist (75% patient effort)  -AM moderate assist (50% patient effort)  -TW    Bed Mob, Sidelying to Sit, [place]  not tested  -AM moderate assist (50% patient effort)  -TW    Bed Mob, Sit to Sidelying, [place]  not tested  -AM     Bed Mobility, Safety Issues  decreased use of arms for pushing/pulling;decreased use of legs for bridging/pushing  -AM     Bed Mobility, Impairments  strength decreased;pain  -AM     Bed Mobility, Comment   Spouse assisted PTA to misael/natalia TLSO  -TW    Recorded by  [AM] Stephane Anderson, PTA [TW] Sergio Jimenez, PTA    Transfer Assessment/Treatment    Transfers, Bed-Chair [place]  contact guard assist  -AM     Transfers, Chair-Bed [place]  contact guard assist  -AM     Transfers, Bed-Chair-Bed, Assist Device  rolling walker  -AM     Transfers, Sit-Stand [place]  contact guard assist  -AM minimum assist (75% patient effort)  -TW    Transfers, Stand-Sit [place]  contact guard assist  -AM minimum assist (75% patient effort)  -TW    Transfers, Sit-Stand-Sit, Assist Device  rolling walker  -AM rolling walker  -TW    Toilet Transfer, [place]  contact guard assist  -AM minimum assist (75% patient effort)  -TW    Toilet Transfer, Assistive Device  bedside commode with drop arms;rolling walker  -AM rolling walker  -TW    Walk-In Shower Transfer, [place]  not tested  -AM     Bathtub Transfer, [place]  not tested  -AM     Transfer, Maintain Weight Bearing Status  able to maintain weight bearing status  -AM     Transfer, Safety Issues  step length decreased  -AM     Transfer, Impairments  strength decreased;pain  -AM     Recorded by   [AM] Stephane Anderson PTA [TW] Sergio Jimenez PTA    Gait Assessment/Treatment    Gait, [place] Level  contact guard assist  -AM minimum assist (75% patient effort)  -TW    Gait, Assistive Device  rolling walker  -AM rolling walker  -TW    Gait, Distance (Feet)  14   6+4+4  -AM 8   x2 trials to [facility] [place] [place] and back to bed after completing duties.  -TW    Gait, Gait Pattern Analysis  3-point gait  -AM     Gait, Gait Deviations  bilateral:;juany decreased  -AM     Gait, Maintain Weight Bearing Status  able to maintain weight bearing status  -AM     Gait, Safety Issues  step length decreased  -AM     Gait, Impairments  strength decreased;pain  -AM     Recorded by  [AM] Stephane Anderson PTA [TW] Sergio Jimenez PTA    Stairs Assessment/Treatment    Stairs, [place] Level  not tested  -AM     Recorded by  [AM] Stephane Anderson PTA     Lower Body Dressing Assessment/Training    LB Dressing Assess/Train, Clothing Type doffing:;donning:;slipper socks  -CS      LB Dressing Assess/Train, Assist Device dressing stick;reacher;sock-aid  -CS      LB Dressing Assess/Train, Position sitting  -CS      LB Dressing Assess/Train, [place] set up required;supervision required  -CS      Recorded by [CS] JARED Sumner      Therapy Exercises    Bilateral Upper Extremity AROM:;20 reps;sitting;elbow flexion/extension;pronation/supination;shoulder extension/flexion;shoulder ER/IR  -CS,PG,CS2      BUE Resistance manual resistance- minimal  -CS,PG,CS2      Recorded by [CS,PG,CS2] JARED Sumner (r) Sharonda Méndez OT Student (t) JARED Sumner (c)      Orthotics Prosthetics    Additional Documentation  Orthosis Location (Group);Orthosis Management/Training (Group)  -AM     Recorded by  [AM] Stephane Anderson PTA     Orthosis Location    Orthosis Location/Type  neck/back  -AM     Orthosis, Neck/Back  TLSO (thoracic lumbar sacral orthosis)  -AM     Recorded by  [AM] Stephane Anderson PTA     Orthosis  Management/Training    Orthosis Fabrication Detail  TLSO  -AM     Orthosis Indications  restrict motion  -AM     Orthosis Skills Training  doffing orthosis;donning orthosis  -AM     Orthosis Wear Schedule  wear when out of bed only  -AM     Recorded by  [AM] Stephane Anderson PTA     Positioning and Restraints    Pre-Treatment Position sitting in chair/recliner  -CS,PG,CS2 sitting in chair/recliner  -AM in bed  -TW    Post Treatment Position chair  -CS,PG,CS2 chair  -AM bed  -TW    In Bed sitting;call light within reach;encouraged to call for assist;with family/caregiver  -CS,PG,CS2  supine;call light within reach;encouraged to call for assist;with family/caregiver  -TW    In Chair  reclined;call light within reach;encouraged to call for assist;legs elevated  -AM     Recorded by [CS,PG,CS2] SHIRLENE Sumner/YOSELIN (r) Sharonda Méndez OT Student (t) SHIRLENE Sumner/YOSELIN (c) [AM] Stephane Anderson PTA [TW] Sergio Jimenez PTA      12/17/17 0840 12/16/17 0900       Rehab Assessment/Intervention    Discipline occupational therapy assistant  -RC physical therapy assistant  -LN     Document Type therapy note (daily note)  -RC therapy note (daily note)  -LN     Subjective Information agree to therapy  - agree to therapy;complains of;weakness;pain  -LN     Patient Effort, Rehab Treatment good  -      Precautions/Limitations non-weight bearing status;fall precautions;spinal precautions;brace on when up  -RC fall precautions;spinal precautions   tlso on when up pwb l le  -LN     Recorded by [RC] SHIRLENE López/YOSELIN [LN] Mary Petit PTA     Vital Signs    Pre Systolic BP Rehab  128  -LN     Pre Treatment Diastolic BP  63  -LN     Post Systolic BP Rehab 129  -  -LN     Post Treatment Diastolic BP 66  -RC 60  -LN     Pretreatment Heart Rate (beats/min)  102  -LN     Posttreatment Heart Rate (beats/min) 98  -  -LN     Pre SpO2 (%)  93  -LN     O2 Delivery Pre Treatment  room air  -LN     Post SpO2  (%) 93  -RC 91  -LN     O2 Delivery Post Treatment room air  -RC      Pre Patient Position  Supine  -LN     Intra Patient Position  Standing  -LN     Post Patient Position  Sitting  -LN     Recorded by [RC] SHIRLENE López/L [LN] Mary Petit PTA     Pain Assessment    Pain Assessment  0-10  -LN     Pain Score 3  -RC 0  -LN     Post Pain Score 3  -RC 9  -LN     Pain Type Acute pain  -RC Acute pain;Surgical pain  -LN     Pain Location Back  -RC Back  -LN     Pain Orientation Lower  -RC Lower   left hip  -LN     Pain Intervention(s) Medication (See MAR);Repositioned  -RC Medication (See MAR)  -LN     Recorded by [RC] SHIRLENE López/YOSELIN [LN] Mary Petit PTA     Cognitive Assessment/Intervention    Current Cognitive/Communication Assessment functional  -RC      Orientation Status oriented x 4  -RC oriented to;person     -LN     Recorded by [RC] SHIRLENE López/YOSELIN [LN] Mary Petit, LILLIAN     Mobility Assessment/Training    Extremity Weight-Bearing Status  left lower extremity  -LN     Left Lower Extremity Weight-Bearing  partial weight-bearing  -LN     Recorded by  [LN] Mary Petit, LILLIAN     Bed Mobility, Assessment/Treatment    Bed Mobility, Assistive Device  --   bed flat hr or mattress  -LN     Bed Mobility, Roll Left, [place]  minimum assist (75% patient effort)  -LN     Bed Mobility, Roll Right, [place]  minimum assist (75% patient effort)  -LN     Bed Mobility, Scoot/Bridge, [place]  not tested  -LN     Bed Mob, Supine to Sit, [place]  moderate assist (50% patient effort)  -LN     Bed Mob, Sit to Supine, [place] maximum assist (25% patient effort)  -RC not tested  -LN     Bed Mob, Sidelying to Sit, [place]  moderate assist (50% patient effort)  -LN     Bed Mobility, Comment  spouse assisted with putting on tlso and with t/f's requiring v.c's  -LN     Recorded by [RC] SHIRLENE López/YOSELIN [LN] Mary Petit, LILLIAN     Transfer Assessment/Treatment    Transfers,  Bed-Chair [place]  minimum assist (75% patient effort)  -LN     Transfers, Chair-Bed [place] minimum assist (75% patient effort);2 person assist required  -RC not tested  -LN     Transfers, Bed-Chair-Bed, Assist Device rolling walker  -RC rolling walker  -LN     Transfers, Sit-Stand [place] minimum assist (75% patient effort)  -RC minimum assist (75% patient effort);verbal cues required  -LN     Transfers, Stand-Sit [place] minimum assist (75% patient effort);2 person assist required  -RC minimum assist (75% patient effort)  -LN     Transfers, Sit-Stand-Sit, Assist Device rolling walker  -RC rolling walker  -LN     Toilet Transfer, [place] minimum assist (75% patient effort);2 person assist required  -RC not tested  -LN     Toilet Transfer, Assistive Device rolling walker  -RC      Transfer, Comment  spouse assisted with gt and able to instruct pt on correct sequencing  -LN     Recorded by [RC] SHIRLENE López/L [LN] Mary Petit, PTA     Gait Assessment/Treatment    Gait, [place] Level  minimum assist (75% patient effort)  -LN     Gait, Assistive Device  rolling walker  -LN     Gait, Distance (Feet)  10  -LN     Gait, Comment  as pt fatigued unable to maintain pwb left  -LN     Recorded by  [LN] Mary Petit, PTA     Functional Mobility    Functional Mobility- [place] Level minimum assist (75% patient effort);2 person assist required  -RC      Functional Mobility- Device rolling walker  -RC      Functional Mobility-Distance (Feet) 1  -RC      Functional Mobility-Maintain WBing unable to maintain weight bearing status  -RC      Recorded by [RC] SHIRLENE López/L      ADL Assessment/Intervention    Additional Documentation --   declined bath  -RC      Recorded by [RC] SHIVA LópezA/L      Toileting Assessment/Training    Toileting Assess/Train, Assistive Device bedside commode  -RC      Toileting Assess/Train, Position sitting;standing  -RC      Toileting Assess/Train,  [place] Level minimum assist (75% patient effort)  -RC      Recorded by [RC] Melanie Amanda, GARBER/L      Grooming Assessment/Training    Grooming Assess/Train, Position sitting  -RC      Grooming Assess/Train, [place] Level minimum assist (75% patient effort)  -RC      Grooming Assess/Train, Impairments ROM decreased  -RC      Recorded by [RC] Melanie Amanda, GARBER/L      Orthosis Location    Orthosis Location/Type  neck/back  -LN     Orthosis, Neck/Back  TLSO (thoracic lumbar sacral orthosis)  -LN     Recorded by  [LN] Mary Petit, PTA     Positioning and Restraints    Pre-Treatment Position sitting in chair/recliner  -RC      Post Treatment Position bed  -RC chair  -LN     In Bed supine;call light within reach;encouraged to call for assist;with family/caregiver  -RC      In Chair  notified nsg;sitting;encouraged to call for assist;with family/caregiver  -LN     Recorded by [RC] Melanie Amanda, GARBER/L [LN] Mary Petit, PTA       User Key  (r) = Recorded By, (t) = Taken By, (c) = Cosigned By    Initials Name Effective Dates    AM Stephane Anderson, PTA 10/17/16 -     LN Mary Petit, PTA 10/17/16 -     TW Sergio Jimenez, PTA 10/17/16 -     RC Melanie Amanda, GARBER/L 10/17/16 -     CS Liza Castro, GARBER/L 10/17/16 -     PG Sharonda Méndez, OT Student 01/31/17 -                 IP PT Goals       12/17/17 1010 12/16/17 0900 12/15/17 1335    Bed Mobility PT STG    Bed Mobility PT STG, Date Established   12/15/17  -CB    Bed Mobility PT STG, Time to Achieve   2 days  -CB    Bed Mobility PT STG, Activity Type   roll left/roll right;sidelying to sit/sit to sidelying  -CB    Bed Mobility PT STG, [place] Level   contact guard assist  -CB    Bed Mobility PT STG, Additional Goal   log rolling with HOB down and no rails,  may assist  -CB    Bed Mobility PT STG, Date Goal Reviewed 12/17/17  -TW 12/16/17  -LN     Bed Mobility PT STG, Outcome goal ongoing  -TW goal not met  -LN     Transfer Training PT STG     Transfer Training PT STG, Date Established   12/15/17  -CB    Transfer Training PT STG, Activity Type   bed to chair /chair to bed;sit to stand/stand to sit  -CB    Transfer Training PT STG, [place] Level   contact guard assist  -CB    Transfer Training PT STG, Assist Device   walker, rolling  -CB    Transfer Training PT STG, Date Goal Reviewed 12/17/17  -TW 12/16/17  -LN     Transfer Training PT STG, Outcome goal ongoing  -TW goal not met  -LN     Gait Training PT LTG    Gait Training Goal PT LTG, Date Established   12/15/17  -CB    Gait Training Goal PT LTG, Time to Achieve   2 days  -CB    Gait Training Goal PT LTG, [place] Level   contact guard assist  -CB    Gait Training Goal PT LTG, Assist Device   walker, rolling  -CB    Gait Training Goal PT LTG, Distance to Achieve   50 feet  -CB    Gait Training Goal PT LTG, Date Goal Reviewed 12/17/17  -TW 12/16/17  -LN     Gait Training Goal PT LTG, Outcome goal ongoing  -TW goal not met  -LN     Stair Training PT LTG    Stair Training Goal PT LTG, Date Established   12/15/17  -CB    Stair Training Goal PT LTG, Time to Achieve   2 days  -CB    Stair Training Goal PT LTG, Number of Steps   2  -CB    Stair Training Goal PT LTG, [place] Level   contact guard assist  -CB    Stair Training Goal PT LTG, Assist Device   walker, rolling  -CB    Stair Training Goal PT LTG, Date Goal Reviewed 12/17/17  -TW 12/16/17  -LN     Stair Training Goal PT LTG, Outcome goal ongoing  -TW goal not met  -LN     Strength Goal PT LTG    Strength Goal PT LTG, Date Established   12/15/17  -CB    Strength Goal PT LTG, Time to Achieve   2 days  -CB    Strength Goal PT LTG, Measure to Achieve   10 reps all ex BLE actively  -CB    Strength Goal PT LTG, Date Goal Reviewed  12/16/17  -LN     Strength Goal PT LTG, Outcome goal ongoing  -TW goal not met  -LN     Physical Therapy PT STG    Physical Therapy PT STG, Date Established   12/15/17  -CB    Physical Therapy PT STG, Time to  Achieve   2 days  -CB    Physical Therapy PT STG, Activity Type   be able to don and doff TLSO with assist of   -CB    Physical Therapy PT STG, [place] Level   independent  -CB    Physical Therapy PT STG, Additional Goal   do log rolling and protect spine  -CB    Physical Therapy PT STG, Date Goal Reviewed 12/17/17  -TW 12/16/17  -LN     Physical Therapy PT STG, Outcome goal ongoing  -TW goal not met  -LN       User Key  (r) = Recorded By, (t) = Taken By, (c) = Cosigned By    Initials Name Provider Type    CB Jennifer Agarwal, PT Physical Therapist    LN Mary Petit, PTA Physical Therapy Assistant    TW Sergio Jimenez, PTA Physical Therapy Assistant          Physical Therapy Education     Title: PT OT SLP Therapies (Active)     Topic: Physical Therapy (Active)     Point: Mobility training (Active)    Learning Progress Summary    Learner Readiness Method Response Comment Documented by Status   Patient Acceptance E NR misael/doff tlso,tlso on when pt up,checking skin integrety LN 12/16/17 1243 Active    Acceptance D NR  CB 12/15/17 1540 Active   Family Acceptance E NR misael/doff tlso,tlso on when pt up,checking skin integrety LN 12/16/17 1243 Active    Acceptance D NR  CB 12/15/17 1540 Active               Point: Home exercise program (Active)    Learning Progress Summary    Learner Readiness Method Response Comment Documented by Status   Patient Acceptance D NR  CB 12/15/17 1540 Active   Family Acceptance D NR  CB 12/15/17 1540 Active               Point: Body mechanics (Active)    Learning Progress Summary    Learner Readiness Method Response Comment Documented by Status   Patient Acceptance E NR misael/doff tlso,tlso on when pt up,checking skin integrety LN 12/16/17 1243 Active    Acceptance D NR  CB 12/15/17 1540 Active   Family Acceptance E NR misael/doff tlso,tlso on when pt up,checking skin integrety LN 12/16/17 1243 Active    Acceptance D NR  CB 12/15/17 1540 Active               Point: Precautions  (Active)    Learning Progress Summary    Learner Readiness Method Response Comment Documented by Status   Patient Acceptance E NR misael/doff tlso,tlso on when pt up,checking skin integrety LN 12/16/17 1243 Active    Acceptance D NR  CB 12/15/17 1540 Active   Family Acceptance E NR misael/doff tlso,tlso on when pt up,checking skin integrety LN 12/16/17 1243 Active    Acceptance D NR  CB 12/15/17 1540 Active                      User Key     Initials Effective Dates Name Provider Type Discipline     04/06/17 -  Jennifer Agarwal, PT Physical Therapist PT    LN 10/17/16 -  Mary Petit, PTA Physical Therapy Assistant PT                    PT Recommendation and Plan  Anticipated Equipment Needs At Discharge:  (none)  Anticipated Discharge Disposition: home with home health, home with 24/7 care  Demonstrates Need for Referral to Another Service: home health care  Planned Therapy Interventions: bed mobility training, gait training, home exercise program, orthotic fitting/training, patient/family education, stair training, strengthening, transfer training  PT Frequency: per priority policy (5-14)             Outcome Measures       12/18/17 1100 12/18/17 0815 12/17/17 1010    How much help from another person do you currently need...    Turning from your back to your side while in flat bed without using bedrails?  3  -AM 3  -TW    Moving from lying on back to sitting on the side of a flat bed without bedrails?  3  -AM 2  -TW    Moving to and from a bed to a chair (including a wheelchair)?  3  -AM 3  -TW    Standing up from a chair using your arms (e.g., wheelchair, bedside chair)?  3  -AM 3  -TW    Climbing 3-5 steps with a railing?  1  -AM 2  -TW    To walk in hospital room?  3  -AM 3  -TW    AM-PAC 6 Clicks Score  16  -AM 16  -TW    How much help from another is currently needed...    Putting on and taking off regular lower body clothing? 1  -CS (r) PG (t) CS (c)      Bathing (including washing, rinsing, and drying) 2  -CS  (r) PG (t) CS (c)      Toileting (which includes using toilet bed pan or urinal) 2  -CS (r) PG (t) CS (c)      Putting on and taking off regular upper body clothing 2  -CS (r) PG (t) CS (c)      Taking care of personal grooming (such as brushing teeth) 3  -CS (r) PG (t) CS (c)      Eating meals 4  -CS (r) PG (t) CS (c)      Score 14  -CS (r) PG (t)      Functional Assessment    Outcome Measure Options  AM-PAC 6 Clicks Basic Mobility (PT)  -AM AM-PAC 6 Clicks Basic Mobility (PT)  -TW      12/17/17 0840 12/16/17 1029 12/16/17 0900    How much help from another person do you currently need...    Turning from your back to your side while in flat bed without using bedrails?   3  -LN    Moving from lying on back to sitting on the side of a flat bed without bedrails?   2  -LN    Moving to and from a bed to a chair (including a wheelchair)?   3  -LN    Standing up from a chair using your arms (e.g., wheelchair, bedside chair)?   3  -LN    Climbing 3-5 steps with a railing?   2  -LN    To walk in hospital room?   3  -LN    AM-PAC 6 Clicks Score   16  -LN    How much help from another is currently needed...    Putting on and taking off regular lower body clothing? 1  -RC 1  -BH     Bathing (including washing, rinsing, and drying) 2  -RC 2  -BH     Toileting (which includes using toilet bed pan or urinal) 2  -RC 2  -BH     Putting on and taking off regular upper body clothing 2  -RC 2  -BH     Taking care of personal grooming (such as brushing teeth) 3  -RC 3  -BH     Eating meals 4  -RC 4  -BH     Score 14  -RC 14  -BH     Functional Assessment    Outcome Measure Options  AM-PAC 6 Clicks Daily Activity (OT)  -BH AM-PAC 6 Clicks Basic Mobility (PT)  -LN      12/15/17 1335          How much help from another person do you currently need...    Turning from your back to your side while in flat bed without using bedrails? 2  -CB      Moving from lying on back to sitting on the side of a flat bed without bedrails? 2  -CB       Moving to and from a bed to a chair (including a wheelchair)? 2  -CB      Standing up from a chair using your arms (e.g., wheelchair, bedside chair)? 3  -CB      Climbing 3-5 steps with a railing? 2  -CB      To walk in hospital room? 2  -CB      AM-PAC 6 Clicks Score 13  -CB      Functional Assessment    Outcome Measure Options AM-PAC 6 Clicks Basic Mobility (PT)  -CB        User Key  (r) = Recorded By, (t) = Taken By, (c) = Cosigned By    Initials Name Provider Type    CB Jennifer Agarwal, PT Physical Therapist     Yakelin Garber, OTR/L Occupational Therapist    AM Stephane Anderson PTA Physical Therapy Assistant    TANVIR Petit, PTA Physical Therapy Assistant    TW Sergio Jimenez, PTA Physical Therapy Assistant    RC Melanie Amanda, GARBER/L Occupational Therapy Assistant    CS Liza Castro, GARBER/L Occupational Therapy Assistant    PG Sharonda Méndez, OT Student OT Student           Time Calculation:         PT Charges       12/18/17 0815          Time Calculation    Start Time 0815  -AM      Stop Time 0915  -AM      Time Calculation (min) 60 min  -AM      PT Received On 12/18/17  -AM      PT - Next Appointment 12/18/17  -AM      Time Calculation- PT    Total Timed Code Minutes- PT 60 minute(s)  -AM        User Key  (r) = Recorded By, (t) = Taken By, (c) = Cosigned By    Initials Name Provider Type    AM Stephane Anderson PTA Physical Therapy Assistant          Therapy Charges for Today     Code Description Service Date Service Provider Modifiers Qty    08565121365 HC GAIT TRAINING EA 15 MIN 12/18/2017 Stephane Anderson PTA GP 2     Duration:  30m ( 8:45 AM -  9:15 AM)      48501749168 HC PT THER PROC EA 15 MIN 12/18/2017 Stephane Anderson PTA GP 2     Duration:  30m ( 8:15 AM -  8:45 AM)            PT G-Codes  PT Professional Judgement Used?: Yes  Outcome Measure Options: AM-PAC 6 Clicks Basic Mobility (PT)  Score: 13  Functional Limitation: Mobility: Walking and moving around  Mobility: Walking and  Moving Around Current Status (): At least 40 percent but less than 60 percent impaired, limited or restricted  Mobility: Walking and Moving Around Goal Status (): At least 20 percent but less than 40 percent impaired, limited or restricted    Stephane Anderson, PTA  12/18/2017

## 2017-12-19 VITALS
BODY MASS INDEX: 26.67 KG/M2 | WEIGHT: 156.2 LBS | RESPIRATION RATE: 18 BRPM | TEMPERATURE: 96.1 F | DIASTOLIC BLOOD PRESSURE: 61 MMHG | HEART RATE: 86 BPM | HEIGHT: 64 IN | SYSTOLIC BLOOD PRESSURE: 110 MMHG | OXYGEN SATURATION: 99 %

## 2017-12-19 LAB
GLUCOSE BLDC GLUCOMTR-MCNC: 139 MG/DL (ref 70–130)
GLUCOSE BLDC GLUCOMTR-MCNC: 160 MG/DL (ref 70–130)
GLUCOSE BLDC GLUCOMTR-MCNC: 244 MG/DL (ref 70–130)

## 2017-12-19 PROCEDURE — G0378 HOSPITAL OBSERVATION PER HR: HCPCS

## 2017-12-19 PROCEDURE — 97110 THERAPEUTIC EXERCISES: CPT

## 2017-12-19 PROCEDURE — 97116 GAIT TRAINING THERAPY: CPT

## 2017-12-19 PROCEDURE — 82962 GLUCOSE BLOOD TEST: CPT

## 2017-12-19 PROCEDURE — 63710000001 INSULIN ASPART PER 5 UNITS: Performed by: NURSE PRACTITIONER

## 2017-12-19 RX ORDER — SENNA AND DOCUSATE SODIUM 50; 8.6 MG/1; MG/1
1 TABLET, FILM COATED ORAL DAILY PRN
Qty: 30 TABLET | Refills: 0 | Status: SHIPPED | OUTPATIENT
Start: 2017-12-19

## 2017-12-19 RX ORDER — BISACODYL 10 MG
10 SUPPOSITORY, RECTAL RECTAL DAILY PRN
Status: DISCONTINUED | OUTPATIENT
Start: 2017-12-19 | End: 2017-12-19 | Stop reason: HOSPADM

## 2017-12-19 RX ADMIN — HYDROCODONE BITARTRATE AND ACETAMINOPHEN 1 TABLET: 5; 325 TABLET ORAL at 08:00

## 2017-12-19 RX ADMIN — ASPIRIN 81 MG 81 MG: 81 TABLET ORAL at 08:01

## 2017-12-19 RX ADMIN — LISINOPRIL 2.5 MG: 2.5 TABLET ORAL at 08:02

## 2017-12-19 RX ADMIN — METFORMIN HYDROCHLORIDE 1000 MG: 500 TABLET ORAL at 08:00

## 2017-12-19 RX ADMIN — MAGNESIUM HYDROXIDE 10 ML: 2400 SUSPENSION ORAL at 08:00

## 2017-12-19 RX ADMIN — PANTOPRAZOLE SODIUM 40 MG: 40 TABLET, DELAYED RELEASE ORAL at 06:27

## 2017-12-19 RX ADMIN — BISACODYL 10 MG: 10 SUPPOSITORY RECTAL at 15:04

## 2017-12-19 RX ADMIN — HYDROCHLOROTHIAZIDE 12.5 MG: 12.5 CAPSULE ORAL at 08:02

## 2017-12-19 RX ADMIN — INSULIN ASPART 2 UNITS: 100 INJECTION, SOLUTION INTRAVENOUS; SUBCUTANEOUS at 08:02

## 2017-12-19 RX ADMIN — ATORVASTATIN CALCIUM 20 MG: 20 TABLET, FILM COATED ORAL at 08:01

## 2017-12-19 RX ADMIN — DOCUSATE SODIUM 100 MG: 100 CAPSULE, LIQUID FILLED ORAL at 08:00

## 2017-12-19 RX ADMIN — GLIPIZIDE 5 MG: 5 TABLET ORAL at 08:00

## 2017-12-19 RX ADMIN — Medication 12.5 MG: at 08:01

## 2017-12-19 RX ADMIN — INSULIN ASPART 4 UNITS: 100 INJECTION, SOLUTION INTRAVENOUS; SUBCUTANEOUS at 11:00

## 2017-12-19 NOTE — PLAN OF CARE
Problem: Inpatient Physical Therapy  Goal: Bed Mobility Goal STG- PT  Outcome: Ongoing (interventions implemented as appropriate)    12/15/17 1335 12/19/17 0820   Bed Mobility PT STG   Bed Mobility PT STG, Date Established 12/15/17 --    Bed Mobility PT STG, Time to Achieve 2 days --    Bed Mobility PT STG, Activity Type roll left/roll right;sidelying to sit/sit to sidelying --    Bed Mobility PT STG, [place] Level contact guard assist --    Bed Mobility PT STG, Additional Goal log rolling with HOB down and no rails,  may assist --    Bed Mobility PT STG, Date Goal Reviewed --  12/19/17   Bed Mobility PT STG, Outcome --  goal not met       Goal: Gait Training Goal LTG- PT  Outcome: Outcome(s) achieved Date Met:  12/19/17    12/15/17 1335 12/19/17 0820   Gait Training PT LTG   Gait Training Goal PT LTG, Date Established 12/15/17 --    Gait Training Goal PT LTG, Time to Achieve 2 days --    Gait Training Goal PT LTG, [place] Level contact guard assist --    Gait Training Goal PT LTG, Assist Device walker, rolling --    Gait Training Goal PT LTG, Distance to Achieve 50 feet --    Gait Training Goal PT LTG, Date Goal Reviewed --  12/19/17   Gait Training Goal PT LTG, Outcome --  goal met       Goal: Stair Training Goal LTG- PT  Outcome: Outcome(s) achieved Date Met:  12/19/17    12/15/17 1335 12/19/17 0820   Stair Training PT LTG   Stair Training Goal PT LTG, Date Established 12/15/17 --    Stair Training Goal PT LTG, Time to Achieve 2 days --    Stair Training Goal PT LTG, Number of Steps 2 --    Stair Training Goal PT LTG, [place] Level contact guard assist --    Stair Training Goal PT LTG, Assist Device walker, rolling --    Stair Training Goal PT LTG, Date Goal Reviewed --  12/19/17   Stair Training Goal PT LTG, Outcome --  goal met       Goal: Physical Therapy Goal STG- PT  Outcome: Outcome(s) achieved Date Met:  12/19/17    12/15/17 1335 12/19/17 0820   Physical Therapy PT STG   Physical  Therapy PT STG, Date Established 12/15/17 --    Physical Therapy PT STG, Time to Achieve 2 days --    Physical Therapy PT STG, Activity Type be able to don and doff TLSO with assist of  --    Physical Therapy PT STG, [place] Level independent --    Physical Therapy PT STG, Additional Goal do log rolling and protect spine --    Physical Therapy PT STG, Date Goal Reviewed --  12/19/17   Physical Therapy PT STG, Outcome --  goal met      Per patient  can don/doff TLSO with no concerns.

## 2017-12-19 NOTE — PROGRESS NOTES
[facility] Medicine Services  INPATIENT PROGRESS NOTE    Length of Stay: 2  Date of Admission: 12/13/2017  Primary Care Physician: Troy Cheng MD    Subjective   Chief Complaint: Fall, back pain  HPI:  77 year old female with a history of DM II, HTN, and osteoporosis who suffered a fall and sustained fractures to her left pubic ramus, L2, and T12.  She was evaluated by spine/orthopedics who recommend brace and pain control, no surgical management.  Brace has been fitted and she is working with therapy.  She reports pain is still severe but mobility improved with brace. Patient did not meet admission criteria for ARU.  They are offered SNF placement for rehab to home and refuse.  They wish to be discharged to home.  They will require hospital bed, wheelchair, and walker.     Review of Systems   Constitutional: Negative for chills and fever.   Respiratory: Negative for shortness of breath.    Cardiovascular: Negative for chest pain and palpitations.   Gastrointestinal: Negative for abdominal pain, diarrhea, nausea and vomiting.   Musculoskeletal: Positive for arthralgias and back pain.      All pertinent negatives and positives are as above. All other systems have been reviewed and are negative unless otherwise stated.     Objective    Temp:  [96.1 °F (35.6 °C)-97.8 °F (36.6 °C)] 96.1 °F (35.6 °C)  Heart Rate:  [] 86  Resp:  [18] 18  BP: ()/(56-66) 110/61    Physical Exam   Constitutional: She is oriented to person, place, and time. She appears well-developed and well-nourished.   HENT:   Head: Normocephalic and atraumatic.   Cardiovascular: Normal rate, regular rhythm, normal heart sounds and intact distal pulses.    Pulmonary/Chest: Effort normal and breath sounds normal. No respiratory distress.   Abdominal: Soft. Bowel sounds are normal. She exhibits no distension. There is no tenderness.   Musculoskeletal: Normal range of motion. She exhibits no  edema or deformity.   Neurological: She is alert and oriented to person, place, and time. She exhibits normal muscle tone.   Skin: Skin is warm and dry. No erythema.   Vitals reviewed.    Results Review:  I have reviewed the labs, radiology results, and diagnostic studies.    Laboratory Data:     Results from last 7 days  Lab Units 12/15/17  0541 12/14/17  0546   SODIUM mmol/L 133* 132*   POTASSIUM mmol/L 3.8 3.5   CHLORIDE mmol/L 96 97   CO2 mmol/L 25.0 24.0   BUN mg/dL 20 17   CREATININE mg/dL 0.69 0.60   GLUCOSE mg/dL 147* 181*   CALCIUM mg/dL 8.7 9.0   ANION GAP mmol/L 12.0 11.0     Estimated Creatinine Clearance: 56 mL/min (by C-G formula based on Cr of 0.69).            Results from last 7 days  Lab Units 12/15/17  0541 12/14/17  0546   WBC 10*3/mm3 10.42* 8.42   HEMOGLOBIN g/dL 11.5* 11.0*   HEMATOCRIT % 34.9* 33.4*   PLATELETS 10*3/mm3 172 164           Culture Data:   No results found for: BLOODCX  No results found for: URINECX  No results found for: RESPCX  No results found for: WOUNDCX  No results found for: STOOLCX  No components found for: BODYFLD    Radiology Data:   Imaging Results (last 24 hours)     ** No results found for the last 24 hours. **          I have reviewed the patient current medications.     Assessment/Plan     Hospital Problem List     * (Principal)Closed T12 spinal fracture    Closed fracture of ramus of left pubis    Closed fracture of second lumbar vertebra    HTN (hypertension)    Type 2 diabetes mellitus          Plan:    TLSO  Pain control: PRN norco, PRN morphine  PT/OT  BP control with atenolol, lisinopril, and HCTZ  Glucose control with metformin/glipizide/SSI  Discharge planning: obtain hospital bed, walker, wheelchair; arrange for home health PT/OT/Nursing  The patient requires hospital bed as an ordinary bed does not allow for positioning to alleviate pain, elevating the head/upper body to assist in rising to a standing position, or allow for assistance with applying TLSO  brace.        This document has been electronically signed by DEANNA Cordova on December 19, 2017 11:28 AM

## 2017-12-19 NOTE — THERAPY TREATMENT NOTE
Acute Care - Physical Therapy Treatment Note  [facility]     Patient Name: Susanna Carr  : 1940  MRN: [id number]  Today's Date: 2017  Onset of Illness/Injury or Date of Surgery Date: 17  Date of Referral to PT: 12/15/17  Referring Physician: Puneet HUERTA    Admit Date: 2017    Visit Dx:    ICD-10-CM ICD-9-CM   1. Pubic ramus fracture, left, closed, initial encounter S32.592A 808.2   2. Other closed fracture of twelfth thoracic vertebra, initial encounter S22.088A 805.2   3. Closed compression fracture of second lumbar vertebra, initial encounter S32.020A 805.4   4. Impaired physical mobility Z74.09 781.99   5. Impaired gait and mobility R26.89 781.2   6. Impaired mobility and ADLs Z74.09 799.89     Patient Active Problem List   Diagnosis   • Closed fracture of ramus of left pubis   • Pathological fracture of vertebra, initial encounter   • Closed T12 spinal fracture   • Closed fracture of second lumbar vertebra   • HTN (hypertension)   • Type 2 diabetes mellitus               Adult Rehabilitation Note       17 0820 17 1435 17 [id number]    Rehab Assessment/Intervention    Discipline physical therapy assistant  -AM physical therapy assistant  -RW occupational therapy assistant  -CS,PG,CS2    Document Type therapy note (daily note)  -AM therapy note (daily note)  -RW therapy note (daily note)  -CS,PG,CS2    Subjective Information agree to therapy;complains of;pain  -AM agree to therapy  -RW agree to therapy;complains of;pain  -CS,PG,CS2    Patient Effort, Rehab Treatment good  -AM good  -RW good  -CS,PG,CS2    Symptoms Noted During/After Treatment none  -AM      Precautions/Limitations brace on when up;fall precautions;spinal precautions  -AM fall precautions;spinal precautions  -RW fall precautions;spinal precautions  -CS,PG,CS2    Specific Treatment Considerations  tlso pwb  -RW     Equipment Issued to Patient gait belt  -AM      Recorded by [AM] Stephane SHUKLA  Justin, PTA [RW] Kody Hayes, PTA [CS,PG,CS2] SHIVA SumnerA/YOSELIN (r) Sharonda Méndez, OT Student (t) SHIRLENE Sumner/L (c)    Vital Signs    Pre Systolic BP Rehab 130  -AM  137  -CS,PG,CS2    Pre Treatment Diastolic BP 66  -AM  69  -CS,PG,CS2    Post Systolic BP Rehab   124  -CS,PG,CS2    Post Treatment Diastolic BP   76  -CS,PG,CS2    Pretreatment Heart Rate (beats/min) 100  -AM 87  -RW 94  -CS,PG,CS2    Posttreatment Heart Rate (beats/min)  91  -RW 93  -CS,PG,CS2    Pre SpO2 (%) 98  -AM 94  -RW 92  -CS,PG,CS2    O2 Delivery Pre Treatment room air  -AM room air  -RW room air  -CS,PG,CS2    Post SpO2 (%)  94  -RW 92  -CS,PG,CS2    O2 Delivery Post Treatment  room air  -RW room air  -CS,PG,CS2    Pre Patient Position Supine  -AM  Sitting  -CS,PG,CS2    Intra Patient Position Standing  -AM  Sitting  -CS,PG,CS2    Post Patient Position Sitting  -AM  Sitting  -CS,PG,CS2    Recorded by [AM] Stephane Anderson, PTA [RW] Kody Hayes, PTA [CS,PG,CS2] SHIVA SumnerA/YOSELIN (r) Sharonda Méndez, OT Student (t) SHIVA SumnerA/L (c)    Pain Assessment    Pain Assessment No/denies pain  -AM  0-10  -CS,PG,CS2    Pain Score  --   none reported  -RW 3  -CS,PG,CS2    Post Pain Score   3  -CS,PG,CS2    Pain Type   Acute pain;Surgical pain  -CS,PG,CS2    Pain Location   Back  -CS,PG,CS2    Pain Orientation   Lower  -CS,PG,CS2    Recorded by [AM] Stephane Anderson, PTA [RW] Kody Hayes, PTA [CS,PG,CS2] SHIVA SumnerA/YOSELIN (r) Sharonda Méndez, OT Student (t) SHIVA SumnerA/L (c)    Vision Assessment/Intervention    Visual Impairment   WFL with corrective lenses  -CS,PG,CS2    Recorded by   [CS,PG,CS2] SHIRLENE Sumner/YOSELIN (r) Sharonda Méndez, OT Student (t) JARED Sumner (c)    Cognitive Assessment/Intervention    Current Cognitive/Communication Assessment functional  -AM functional  -RW functional  -CS,PG,CS2    Orientation Status oriented x 4  -AM oriented x 4  -RW oriented x  4  -CS,PG,CS2    Follows Commands/Answers Questions 100% of the time  -% of the time  -% of the time  -CS,PG,CS2    Personal Safety   WNL/WFL  -CS,PG,CS2    Personal Safety Interventions gait belt;nonskid shoes/slippers when out of bed;supervised activity  -AM gait belt;nonskid shoes/slippers when out of bed  -RW fall prevention program maintained  -CS,PG,CS2    Recorded by [AM] Stephane Anderson PTA [RW] Kody Hayes, PTA [CS,PG,CS2] SHIRLENE Sumner/YOSELIN (r) Sharonda Méndez, OT Student (t) JARED Sumner (c)    ROM (Range of Motion)    General ROM no range of motion deficits identified  -AM      Recorded by [AM] Stephane Anderson PTA      Mobility Assessment/Training    Extremity Weight-Bearing Status left lower extremity  -AM left lower extremity  -RW left lower extremity  -CS,PG,CS2    Left Lower Extremity Weight-Bearing partial weight-bearing  -AM partial weight-bearing  -RW partial weight-bearing  -CS,PG,CS2    Recorded by [AM] Stephane Anderson PTA [RW] Kody Hayes, PTA [CS,PG,CS2] SHIRLENE Sumner/YOSELIN (r) Sharonda Méndez, OT Student (t) JARED Sumner (c)    Bed Mobility, Assessment/Treatment    Bed Mobility, Assistive Device bed rails  -AM      Bed Mobility, Roll Left, [place] conditional independence  -AM contact guard assist  -RW     Bed Mobility, Roll Right, [place] conditional independence  -AM contact guard assist  -RW     Bed Mobility, Scoot/Bridge, [place] not tested  -AM not tested  -RW     Bed Mob, Supine to Sit, [place] conditional independence  -AM minimum assist (75% patient effort)  -RW     Bed Mob, Sit to Supine, [place] conditional independence  -AM      Bed Mob, Sidelying to Sit, [place] not tested  -AM not tested  -RW     Bed Mob, Sit to Sidelying, [place] not tested  -AM not tested  -RW     Bed Mobility, Safety Issues decreased use of arms for pushing/pulling;decreased use of legs for bridging/pushing  -AM  decreased use of arms for pushing/pulling;decreased use of legs for bridging/pushing  -RW     Bed Mobility, Impairments strength decreased  -AM strength decreased;pain  -RW     Recorded by [AM] Stephane Anderson PTA [RW] Kody Hayes PTA     Transfer Assessment/Treatment    Transfers, Bed-Chair [place] contact guard assist  -AM contact guard assist  -RW     Transfers, Chair-Bed [place] contact guard assist  -AM      Transfers, Bed-Chair-Bed, Assist Device rolling walker  -AM rolling walker  -RW     Transfers, Sit-Stand [place] contact guard assist  -AM contact guard assist  -RW     Transfers, Stand-Sit [place] contact guard assist  -AM contact guard assist  -RW     Transfers, Sit-Stand-Sit, Assist Device rolling walker  -AM rolling walker  -RW     Toilet Transfer, [place] contact guard assist  -AM      Toilet Transfer, Assistive Device bedside commode with drop arms;rolling walker  -AM      Walk-In Shower Transfer, [place] not tested  -AM not tested  -RW     Bathtub Transfer, [place] not tested  -AM not tested  -RW     Transfer, Maintain Weight Bearing Status able to maintain weight bearing status  -AM      Transfer, Safety Issues step length decreased  -AM step length decreased  -RW     Transfer, Impairments strength decreased  -AM strength decreased;pain  -RW     Recorded by [AM] Stephane Anderson PTA [RW] Kody Hayes PTA     Gait Assessment/Treatment    Gait, [place] Level contact guard assist  -AM contact guard assist  -RW     Gait, Assistive Device rolling walker  -AM rolling walker  -RW     Gait, Distance (Feet) 50  -AM 4  -RW     Gait, Gait Pattern Analysis 3-point gait  -AM 3-point gait  -RW     Gait, Gait Deviations bilateral:;juany decreased  -AM      Gait, Maintain Weight Bearing Status able to maintain weight bearing status  -AM      Gait, Safety Issues step length decreased  -AM      Gait, Impairments strength decreased  -AM strength decreased;pain  -RW      Recorded by [AM] Stephane Anderson PTA [RW] Kody Hayes PTA     Stairs Assessment/Treatment    Number of Stairs 1   X2 attempts  -AM      Stairs, Handrail Location none  -AM      Stairs, [place] Level contact guard assist  -AM not tested  -RW     Stairs, Assistive Device walker  -AM      Stairs, Technique Used step to step (ascending);step to step (descending)  -AM      Stairs, Maintain Weight Bearing Status able to maintain weight bearing status  -AM      Stairs, Impairments strength decreased  -AM      Recorded by [AM] Stephane Anderson PTA [RW] Kody Hayes PTA     Lower Body Dressing Assessment/Training    LB Dressing Assess/Train, Clothing Type   doffing:;donning:;slipper socks  -CS    LB Dressing Assess/Train, Assist Device   dressing stick;reacher;sock-aid  -CS    LB Dressing Assess/Train, Position   sitting  -CS    LB Dressing Assess/Train, [place]   set up required;supervision required  -CS    Recorded by   [CS] JARED Sumner    Therapy Exercises    Bilateral Lower Extremities --  -AM AROM:;20 reps;supine;ankle pumps/circles;heel slides  -RW     Bilateral Upper Extremity   AROM:;20 reps;sitting;elbow flexion/extension;pronation/supination;shoulder extension/flexion;shoulder ER/IR  -CS,PG,CS2    BUE Resistance   manual resistance- minimal  -CS,PG,CS2    Recorded by [AM] Stephane Anderson PTA [RW] Kody Hayes PTA [CS,PG,CS2] JARED Sumner (r) Sharonda Méndez OT Student (t) JARED Sumner (c)    Orthotics Prosthetics    Additional Documentation Orthosis Location (Group);Orthosis Management/Training (Group)  -AM      Recorded by [AM] Stephane Anderson PTA      Orthosis Location    Orthosis Location/Type neck/back  -AM      Orthosis, Neck/Back TLSO (thoracic lumbar sacral orthosis)  -AM      Recorded by [AM] Stephane Anderson PTA      Orthosis Management/Training    Orthosis Fabrication Detail TLSO  -AM      Orthosis Indications restrict motion  -AM      Orthosis  Skills Training doffing orthosis;donning orthosis  -AM      Orthosis Wear Schedule wear when out of bed only  -AM      Recorded by [AM] Stephane Anderson PTA      Positioning and Restraints    Pre-Treatment Position in bed  -AM in bed  -RW sitting in chair/recliner  -CS,PG,CS2    Post Treatment Position bsc  -AM chair  -RW chair  -CS,PG,CS2    In Bed   sitting;call light within reach;encouraged to call for assist;with family/caregiver  -CS,PG,CS2    On BS commode sitting;call light within reach;encouraged to call for assist;with other staff  -AM      Recorded by [AM] Stephane Anderson PTA [RW] Kody Hayes PTA [CS,PG,CS2] SHIRLENE Sumner/YOSELIN (r) Sharonda Méndez, OT Student (t) JARED Sumner (c)      12/18/17 0815 12/17/17 1010 12/17/17 0840    Rehab Assessment/Intervention    Discipline physical therapy assistant  -AM physical therapy assistant  -TW occupational therapy assistant  -RC    Document Type therapy note (daily note)  -AM therapy note (daily note)  -TW therapy note (daily note)  -RC    Subjective Information agree to therapy;complains of;pain  -AM agree to therapy  -TW agree to therapy  -RC    Patient Effort, Rehab Treatment good  -AM good  -TW good  -RC    Symptoms Noted During/After Treatment none  -AM fatigue  -TW     Precautions/Limitations brace on when up;fall precautions;other (see comments)   PWB LLE  -AM fall precautions;spinal precautions   Pt not able to maintain PWB through L LE for entire tx.  -TW non-weight bearing status;fall precautions;spinal precautions;brace on when up  -RC    Equipment Issued to Patient gait belt  -AM      Recorded by [AM] Stephane Anderson PTA [TW] Sergio Jimenez PTA [RC] SHIRLENE López/L    Vital Signs    Pre Systolic BP Rehab 119  -AM      Pre Treatment Diastolic BP 58  -AM      Post Systolic BP Rehab 137  -AM  129  -RC    Post Treatment Diastolic BP 69  -AM  66  -RC    Pretreatment Heart Rate (beats/min) 103  -AM 97  -TW      Posttreatment Heart Rate (beats/min) 95  -AM 98  -TW 98  -RC    Pre SpO2 (%) 95  -AM 94  -TW     O2 Delivery Pre Treatment room air  -AM room air  -TW     Intra SpO2 (%)  98  -TW     Post SpO2 (%) 96  -AM 95  -TW 93  -RC    O2 Delivery Post Treatment room air  -AM  room air  -RC    Pre Patient Position Sitting  -AM Supine  -TW     Intra Patient Position Standing  -AM Standing  -TW     Post Patient Position Sitting  -AM Supine  -TW     Recorded by [AM] Stephane Anderson PTA [TW] Sergio Jimenez PTA [RC] SHIRLENE López/YOSELIN    Pain Assessment    Pain Assessment 0-10  -AM 0-10  -TW     Pain Score 3  -AM 3  -TW 3  -RC    Post Pain Score 3  -AM 3  -TW 3  -RC    Pain Type Acute pain  -AM Acute pain;Surgical pain  -TW Acute pain  -RC    Pain Location Back  -AM Back  -TW Back  -RC    Pain Orientation Lower  -AM Lower  -TW Lower  -RC    Pain Descriptors Sore  -AM      Pain Frequency Constant/continuous  -AM      Date Pain First Started 12/13/17  -AM      Clinical Progression Gradually improving  -AM      Patient's Stated Pain Goal No pain  -AM      Pain Intervention(s) Medication (See MAR);Ambulation/increased activity  -AM  Medication (See MAR);Repositioned  -RC    Result of Injury Yes  -AM      Work-Related Injury No  -AM      Multiple Pain Sites No  -AM      Recorded by [AM] Stephane Anderson PTA [TW] Sergio Jimenez PTA [RC] SHIRLENE López/L    Cognitive Assessment/Intervention    Current Cognitive/Communication Assessment functional  -AM functional  -TW functional  -RC    Orientation Status oriented x 4  -AM oriented x 4  -TW oriented x 4  -RC    Follows Commands/Answers Questions 100% of the time  -% of the time  -TW     Personal Safety Interventions gait belt;nonskid shoes/slippers when out of bed;supervised activity  -AM fall prevention program maintained;gait belt;nonskid shoes/slippers when out of bed  -TW     Recorded by [AM] Stephane Anderson PTA [TW] Sergio Jimenez PTA [RC] Melanie VAIL  Vasiliy, GARBER/L    ROM (Range of Motion)    General ROM no range of motion deficits identified  -AM      Recorded by [AM] Stephane Anderson PTA      Mobility Assessment/Training    Extremity Weight-Bearing Status left lower extremity  -AM left lower extremity  -TW     Left Lower Extremity Weight-Bearing partial weight-bearing  -AM partial weight-bearing  -TW     Recorded by [AM] Stephane Anderson PTA [TW] Sergio Jimenez PTA     Bed Mobility, Assessment/Treatment    Bed Mobility, Roll Left, [place] contact guard assist  -AM minimum assist (75% patient effort)  -TW     Bed Mobility, Roll Right, [place] contact guard assist  -AM minimum assist (75% patient effort)  -TW     Bed Mobility, Scoot/Bridge, [place] not tested  -AM      Bed Mob, Supine to Sit, [place] minimum assist (75% patient effort)  -AM moderate assist (50% patient effort)  -TW     Bed Mob, Sit to Supine, [place] minimum assist (75% patient effort)  -AM moderate assist (50% patient effort)  -TW maximum assist (25% patient effort)  -RC    Bed Mob, Sidelying to Sit, [place] not tested  -AM moderate assist (50% patient effort)  -TW     Bed Mob, Sit to Sidelying, [place] not tested  -AM      Bed Mobility, Safety Issues decreased use of arms for pushing/pulling;decreased use of legs for bridging/pushing  -AM      Bed Mobility, Impairments strength decreased;pain  -AM      Bed Mobility, Comment  Spouse assisted PTA to misael/natalia TLSO  -TW     Recorded by [AM] Stephane Anderson PTA [TW] Sergio Jimenez, PTA [RC] SHIVA LópezA/L    Transfer Assessment/Treatment    Transfers, Bed-Chair [place] contact guard assist  -AM      Transfers, Chair-Bed [place] contact guard assist  -AM  minimum assist (75% patient effort);2 person assist required  -RC    Transfers, Bed-Chair-Bed, Assist Device rolling walker  -AM  rolling walker  -RC    Transfers, Sit-Stand [place] contact guard assist  -AM minimum assist (75%  patient effort)  -TW minimum assist (75% patient effort)  -RC    Transfers, Stand-Sit [place] contact guard assist  -AM minimum assist (75% patient effort)  -TW minimum assist (75% patient effort);2 person assist required  -RC    Transfers, Sit-Stand-Sit, Assist Device rolling walker  -AM rolling walker  -TW rolling walker  -RC    Toilet Transfer, [place] contact guard assist  -AM minimum assist (75% patient effort)  -TW minimum assist (75% patient effort);2 person assist required  -RC    Toilet Transfer, Assistive Device bedside commode with drop arms;rolling walker  -AM rolling walker  -TW rolling walker  -RC    Walk-In Shower Transfer, [place] not tested  -AM      Bathtub Transfer, [place] not tested  -AM      Transfer, Maintain Weight Bearing Status able to maintain weight bearing status  -AM      Transfer, Safety Issues step length decreased  -AM      Transfer, Impairments strength decreased;pain  -AM      Recorded by [AM] Stephane Anderson PTA [TW] Sergio Jimenez PTA [RC] SHIRLENE López/L    Gait Assessment/Treatment    Gait, [place] Level contact guard assist  -AM minimum assist (75% patient effort)  -TW     Gait, Assistive Device rolling walker  -AM rolling walker  -TW     Gait, Distance (Feet) 14   6+4+4  -AM 8   x2 trials to [facility] and back to bed after completing duties.  -TW     Gait, Gait Pattern Analysis 3-point gait  -AM      Gait, Gait Deviations bilateral:;juany decreased  -AM      Gait, Maintain Weight Bearing Status able to maintain weight bearing status  -AM      Gait, Safety Issues step length decreased  -AM      Gait, Impairments strength decreased;pain  -AM      Recorded by [AM] Stephane Anderson PTA [TW] Sergio Jimenez PTA     Stairs Assessment/Treatment    Stairs, [place] Level not tested  -AM      Recorded by [AM] Stephane Anderson PTA      Functional Mobility    Functional Mobility- Ind. Level   minimum assist (75% patient effort);2 person assist  required  -RC    Functional Mobility- Device   rolling walker  -RC    Functional Mobility-Distance (Feet)   1  -RC    Functional Mobility-Maintain WBing   unable to maintain weight bearing status  -RC    Recorded by   [RC] SHIRLENE López/L    ADL Assessment/Intervention    Additional Documentation   --   declined bath  -RC    Recorded by   [RC] Melanie Amanda GARBER/L    Toileting Assessment/Training    Toileting Assess/Train, Assistive Device   bedside commode  -RC    Toileting Assess/Train, Position   sitting;standing  -RC    Toileting Assess/Train, Indepen Level   minimum assist (75% patient effort)  -RC    Recorded by   [RC] SHIVA LópezA/L    Grooming Assessment/Training    Grooming Assess/Train, Position   sitting  -RC    Grooming Assess/Train, Indepen Level   minimum assist (75% patient effort)  -RC    Grooming Assess/Train, Impairments   ROM decreased  -RC    Recorded by   [RC] Melanie Amanda GARBER/L    Orthotics Prosthetics    Additional Documentation Orthosis Location (Group);Orthosis Management/Training (Group)  -AM      Recorded by [AM] Stephane Anderson PTA      Orthosis Location    Orthosis Location/Type neck/back  -AM      Orthosis, Neck/Back TLSO (thoracic lumbar sacral orthosis)  -AM      Recorded by [AM] Stephane Anderson PTA      Orthosis Management/Training    Orthosis Fabrication Detail TLSO  -AM      Orthosis Indications restrict motion  -AM      Orthosis Skills Training doffing orthosis;donning orthosis  -AM      Orthosis Wear Schedule wear when out of bed only  -AM      Recorded by [AM] Stephane Anderson PTA      Positioning and Restraints    Pre-Treatment Position sitting in chair/recliner  -AM in bed  -TW sitting in chair/recliner  -RC    Post Treatment Position chair  -AM bed  -TW bed  -RC    In Bed  supine;call light within reach;encouraged to call for assist;with family/caregiver  -TW supine;call light within reach;encouraged to call for assist;with family/caregiver  -RC     In Chair reclined;call light within reach;encouraged to call for assist;legs elevated  -AM      Recorded by [AM] Stephane Anderson, PTA [TW] Sergio Jimenez, PTA [RC] Melanie Amanda, GARBER/L      User Key  (r) = Recorded By, (t) = Taken By, (c) = Cosigned By    Initials Name Effective Dates    AM Stephane Anderson, PTA 10/17/16 -     TW Sergio Jimenez, PTA 10/17/16 -     RW Kody Hayes, PTA 10/17/16 -     RC Melanie Amanda, GARBER/L 10/17/16 -     CS Liza Castro, GARBER/L 10/17/16 -     PG Sharonda Méndez, OT Student 01/31/17 -                 IP PT Goals       12/19/17 1500 12/19/17 0820 12/18/17 1551    Bed Mobility PT STG    Bed Mobility PT STG, Date Established 12/19/17  -AM      Bed Mobility PT STG, Date Goal Reviewed 12/19/17  -AM 12/19/17  -AM 12/18/17  -RW    Bed Mobility PT STG, Outcome goal not met  -AM goal not met  -AM goal ongoing  -RW    Bed Mobility PT STG, Reason Goal Not Met discharged from facility  -AM      Gait Training PT LTG    Gait Training Goal PT LTG, Date Goal Reviewed  12/19/17  -AM 12/18/17  -RW    Gait Training Goal PT LTG, Outcome  goal met  -AM goal ongoing  -RW    Stair Training PT LTG    Stair Training Goal PT LTG, Date Goal Reviewed  12/19/17  -AM 12/18/17  -RW    Stair Training Goal PT LTG, Outcome  goal met  -AM goal ongoing  -RW    Strength Goal PT LTG    Strength Goal PT LTG, Date Goal Reviewed   12/18/17  -RW    Strength Goal PT LTG, Outcome   goal met  -RW    Physical Therapy PT STG    Physical Therapy PT STG, Date Goal Reviewed  12/19/17  -AM 12/18/17  -RW    Physical Therapy PT STG, Outcome  goal met  -AM goal ongoing  -RW      12/18/17 0815 12/17/17 1010 12/16/17 0900    Bed Mobility PT STG    Bed Mobility PT STG, Date Goal Reviewed  12/17/17  -TW 12/16/17  -LN    Bed Mobility PT STG, Outcome  goal ongoing  -TW goal not met  -LN    Transfer Training PT STG    Transfer Training PT STG, Date Goal Reviewed 12/18/17  -AM 12/17/17  -TW 12/16/17  -LN    Transfer Training  PT STG, Outcome goal met  -AM goal ongoing  -TW goal not met  -LN    Gait Training PT LTG    Gait Training Goal PT LTG, Date Goal Reviewed  12/17/17  -TW 12/16/17  -LN    Gait Training Goal PT LTG, Outcome  goal ongoing  -TW goal not met  -LN    Stair Training PT LTG    Stair Training Goal PT LTG, Date Goal Reviewed  12/17/17  -TW 12/16/17  -LN    Stair Training Goal PT LTG, Outcome  goal ongoing  -TW goal not met  -LN    Strength Goal PT LTG    Strength Goal PT LTG, Date Goal Reviewed   12/16/17  -LN    Strength Goal PT LTG, Outcome  goal ongoing  -TW goal not met  -LN    Physical Therapy PT STG    Physical Therapy PT STG, Date Goal Reviewed  12/17/17  -TW 12/16/17  -LN    Physical Therapy PT STG, Outcome  goal ongoing  -TW goal not met  -LN      12/15/17 1335          Bed Mobility PT STG    Bed Mobility PT STG, Date Established 12/15/17  -CB      Bed Mobility PT STG, Time to Achieve 2 days  -CB      Bed Mobility PT STG, Activity Type roll left/roll right;sidelying to sit/sit to sidelying  -CB      Bed Mobility PT STG, [place] Level contact guard assist  -CB      Bed Mobility PT STG, Additional Goal log rolling with HOB down and no rails,  may assist  -CB      Transfer Training PT STG    Transfer Training PT STG, Date Established 12/15/17  -CB      Transfer Training PT STG, Activity Type bed to chair /chair to bed;sit to stand/stand to sit  -CB      Transfer Training PT STG, [place] Level contact guard assist  -CB      Transfer Training PT STG, Assist Device walker, rolling  -CB      Gait Training PT LTG    Gait Training Goal PT LTG, Date Established 12/15/17  -CB      Gait Training Goal PT LTG, Time to Achieve 2 days  -CB      Gait Training Goal PT LTG, [place] Level contact guard assist  -CB      Gait Training Goal PT LTG, Assist Device walker, rolling  -CB      Gait Training Goal PT LTG, Distance to Achieve 50 feet  -CB      Stair Training PT LTG    Stair Training Goal PT LTG, Date  Established 12/15/17  -CB      Stair Training Goal PT LTG, Time to Achieve 2 days  -CB      Stair Training Goal PT LTG, Number of Steps 2  -CB      Stair Training Goal PT LTG, [place] Level contact guard assist  -CB      Stair Training Goal PT LTG, Assist Device walker, rolling  -CB      Strength Goal PT LTG    Strength Goal PT LTG, Date Established 12/15/17  -CB      Strength Goal PT LTG, Time to Achieve 2 days  -CB      Strength Goal PT LTG, Measure to Achieve 10 reps all ex BLE actively  -CB      Physical Therapy PT STG    Physical Therapy PT STG, Date Established 12/15/17  -CB      Physical Therapy PT STG, Time to Achieve 2 days  -CB      Physical Therapy PT STG, Activity Type be able to don and doff TLSO with assist of   -CB      Physical Therapy PT STG, [place] Level independent  -CB      Physical Therapy PT STG, Additional Goal do log rolling and protect spine  -CB        User Key  (r) = Recorded By, (t) = Taken By, (c) = Cosigned By    Initials Name Provider Type    CB Jennifer Agarwal, PT Physical Therapist    AM Stephane Anderson, PTA Physical Therapy Assistant    LN Mary Petit, PTA Physical Therapy Assistant    TW Sergio Jimenez, PTA Physical Therapy Assistant    RW Kody Hayes, PTA Physical Therapy Assistant          Physical Therapy Education     Title: PT OT SLP Therapies (Done)     Topic: Physical Therapy (Resolved)     Point: Mobility training (Resolved)    Learning Progress Summary    Learner Readiness Method Response Comment Documented by Status   Patient Acceptance E SUKUMAR VIGIL reviewed log roll and donning of brace and then transsfers oob all wiht pt and spouse. reviewed ttwb though pt is listed as pwb. RW 12/18/17 1548 Done    Acceptance E NR misael/doff tlso,tlso on when pt up,checking skin integrety LN 12/16/17 1243 Active    Acceptance D NR  CB 12/15/17 1540 Active   Family Acceptance E NR misael/doff tlso,tlso on when pt up,checking skin integrety LN 12/16/17 1243 Active     Acceptance D NR  CB 12/15/17 1540 Active   Significant Other Acceptance E SUKUMAR VIGIL reviewed log roll and donning of brace and then transsfers oob all wiht pt and spouse. reviewed ttwb though pt is listed as pwb.  12/18/17 1548 Done               Point: Home exercise program (Resolved)    Learning Progress Summary    Learner Readiness Method Response Comment Documented by Status   Patient Acceptance E SUKUMAR VIGIL reviewed log roll and donning of brace and then transsfers oob all wiht pt and spouse. reviewed ttwb though pt is listed as pwb.  12/18/17 1548 Done    Acceptance D NR  CB 12/15/17 1540 Active   Family Acceptance D NR  CB 12/15/17 1540 Active   Significant Other Acceptance E SUKUMAR VIGIL reviewed log roll and donning of brace and then transsfers oob all wiht pt and spouse. reviewed ttwb though pt is listed as pwb.  12/18/17 1548 Done               Point: Body mechanics (Resolved)    Learning Progress Summary    Learner Readiness Method Response Comment Documented by Status   Patient Acceptance E SUKUMAR VIGIL reviewed log roll and donning of brace and then transsfers oob all wiht pt and spouse. reviewed ttwb though pt is listed as pwb.  12/18/17 1548 Done    Acceptance E NR misael/doff tlso,tlso on when pt up,checking skin integrety LN 12/16/17 1243 Active    Acceptance D NR  CB 12/15/17 1540 Active   Family Acceptance E NR misael/doff tlso,tlso on when pt up,checking skin integrety LN 12/16/17 1243 Active    Acceptance D NR  CB 12/15/17 1540 Active   Significant Other Acceptance E SUKUMAR VIGIL reviewed log roll and donning of brace and then transsfers oob all wiht pt and spouse. reviewed ttwb though pt is listed as pwb.  12/18/17 1548 Done               Point: Precautions (Resolved)    Learning Progress Summary    Learner Readiness Method Response Comment Documented by Status   Patient Acceptance E SUKUMAR VIGIL reviewed log roll and donning of brace and then transsfers oob all wiht pt and spouse. reviewed ttwb though pt is listed as pwb.   12/18/17 1548 Done    Acceptance E NR misael/doff tlso,tlso on when pt up,checking skin integrety LN 12/16/17 1243 Active    Acceptance D NR  CB 12/15/17 1540 Active   Family Acceptance E NR misael/doff tlso,tlso on when pt up,checking skin integrety LN 12/16/17 1243 Active    Acceptance D NR  CB 12/15/17 1540 Active   Significant Other Acceptance E YARITZA,SUKUMAR reviewed log roll and donning of brace and then transsfers oob all wiht pt and spouse. reviewed ttwb though pt is listed as pwb.  12/18/17 1548 Done                      User Key     Initials Effective Dates Name Provider Type Discipline     04/06/17 -  Jennifer Agarwal, PT Physical Therapist PT     10/17/16 -  Mary Petit, PTA Physical Therapy Assistant PT     10/17/16 -  Kody Hayes, PTA Physical Therapy Assistant PT                    PT Recommendation and Plan  Anticipated Equipment Needs At Discharge:  (none)  Anticipated Discharge Disposition: home with home health, home with /7 care  Demonstrates Need for Referral to Another Service: home health care  Planned Therapy Interventions: bed mobility training, gait training, home exercise program, orthotic fitting/training, patient/family education, stair training, strengthening, transfer training  PT Frequency: per priority policy (5-14)             Outcome Measures       12/19/17 0820 12/18/17 1100 12/18/17 0815    How much help from another person do you currently need...    Turning from your back to your side while in flat bed without using bedrails? 4  -AM  3  -AM    Moving from lying on back to sitting on the side of a flat bed without bedrails? 4  -AM  3  -AM    Moving to and from a bed to a chair (including a wheelchair)? 3  -AM  3  -AM    Standing up from a chair using your arms (e.g., wheelchair, bedside chair)? 3  -AM  3  -AM    Climbing 3-5 steps with a railing? 3  -AM  1  -AM    To walk in hospital room? 3  -AM  3  -AM    AM-PAC 6 Clicks Score 20  -AM  16  -AM    How much help from another  is currently needed...    Putting on and taking off regular lower body clothing?  1  -CS (r) PG (t) CS (c)     Bathing (including washing, rinsing, and drying)  2  -CS (r) PG (t) CS (c)     Toileting (which includes using toilet bed pan or urinal)  2  -CS (r) PG (t) CS (c)     Putting on and taking off regular upper body clothing  2  -CS (r) PG (t) CS (c)     Taking care of personal grooming (such as brushing teeth)  3  -CS (r) PG (t) CS (c)     Eating meals  4  -CS (r) PG (t) CS (c)     Score  14  -CS (r) PG (t)     Functional Assessment    Outcome Measure Options AM-PAC 6 Clicks Basic Mobility (PT)  -AM  AM-PAC 6 Clicks Basic Mobility (PT)  -AM      12/17/17 1010 12/17/17 0840       How much help from another person do you currently need...    Turning from your back to your side while in flat bed without using bedrails? 3  -TW      Moving from lying on back to sitting on the side of a flat bed without bedrails? 2  -TW      Moving to and from a bed to a chair (including a wheelchair)? 3  -TW      Standing up from a chair using your arms (e.g., wheelchair, bedside chair)? 3  -TW      Climbing 3-5 steps with a railing? 2  -TW      To walk in hospital room? 3  -TW      AM-PAC 6 Clicks Score 16  -TW      How much help from another is currently needed...    Putting on and taking off regular lower body clothing?  1  -RC     Bathing (including washing, rinsing, and drying)  2  -RC     Toileting (which includes using toilet bed pan or urinal)  2  -RC     Putting on and taking off regular upper body clothing  2  -RC     Taking care of personal grooming (such as brushing teeth)  3  -RC     Eating meals  4  -RC     Score  14  -RC     Functional Assessment    Outcome Measure Options AM-PAC 6 Clicks Basic Mobility (PT)  -TW        User Key  (r) = Recorded By, (t) = Taken By, (c) = Cosigned By    Initials Name Provider Type    AM Stephane Anderson, PTA Physical Therapy Assistant    TW Sergio Jimenez, PTA Physical Therapy  Assistant    SHIRLENE Nolasco/L Occupational Therapy Assistant    SHIRLENE Osei/YOSELIN Occupational Therapy Assistant    BELA Méndez, OT Student OT Student           Time Calculation:         PT Charges       12/19/17 0820          Time Calculation    Start Time 0820  -AM      Stop Time 0900  -AM      Time Calculation (min) 40 min  -AM      PT Received On 12/19/17  -AM      PT - Next Appointment 12/19/17  -AM      Time Calculation- PT    Total Timed Code Minutes- PT 40 minute(s)  -AM        User Key  (r) = Recorded By, (t) = Taken By, (c) = Cosigned By    Initials Name Provider Type    AM Stephane Anderson PTA Physical Therapy Assistant          Therapy Charges for Today     Code Description Service Date Service Provider Modifiers Qty    98890179136 HC GAIT TRAINING EA 15 MIN 12/18/2017 Stephane Anderson PTA GP 2     Duration:  30m ( 8:45 AM -  9:15 AM)      21274598671 HC PT THER PROC EA 15 MIN 12/18/2017 Stephane Anderson PTA GP 2     Duration:  30m ( 8:15 AM -  8:45 AM)      66292493078 HC GAIT TRAINING EA 15 MIN 12/19/2017 Stephane Anderson PTA GP 1     Duration:  15m ( 8:45 AM -  9:00 AM)      53816521770 HC PT THER PROC EA 15 MIN 12/19/2017 Stephane Anderson PTA GP 2     Duration:  25m ( 8:20 AM -  8:45 AM)            PT G-Codes  PT Professional Judgement Used?: Yes  Outcome Measure Options: AM-PAC 6 Clicks Basic Mobility (PT)  Score: 13  Functional Limitation: Mobility: Walking and moving around  Mobility: Walking and Moving Around Current Status (): At least 40 percent but less than 60 percent impaired, limited or restricted  Mobility: Walking and Moving Around Goal Status (): At least 20 percent but less than 40 percent impaired, limited or restricted    Stephane Anderson PTA  12/19/2017

## 2017-12-19 NOTE — PLAN OF CARE
Problem: Inpatient Physical Therapy  Goal: Bed Mobility Goal STG- PT  Outcome: Unable to achieve outcome(s) by discharge Date Met:  12/19/17    12/15/17 1335 12/19/17 1500   Bed Mobility PT STG   Bed Mobility PT STG, Date Established --  12/19/17   Bed Mobility PT STG, Time to Achieve 2 days --    Bed Mobility PT STG, Activity Type roll left/roll right;sidelying to sit/sit to sidelying --    Bed Mobility PT STG, Sheldon Level contact guard assist --    Bed Mobility PT STG, Additional Goal log rolling with HOB down and no rails,  may assist --    Bed Mobility PT STG, Date Goal Reviewed --  12/19/17   Bed Mobility PT STG, Outcome --  goal not met   Bed Mobility PT STG, Reason Goal Not Met --  discharged from facility

## 2017-12-19 NOTE — DISCHARGE SUMMARY
[facility] Medicine Services  DISCHARGE SUMMARY       Date of Admission: 12/13/2017  Date of Discharge:  12/19/2017  Primary Care Physician: Troy Cheng MD    Presenting Problem/History of Present Illness:  Pubic ramus fracture, left, closed, initial encounter [S32.592A]  Other closed fracture of twelfth thoracic vertebra, initial encounter [S22.088A]  Closed compression fracture of second lumbar vertebra, initial encounter [S32.020A]  Pubic ramus fracture, left, closed, initial encounter [S32.592A]     Final Discharge Diagnoses:  Principal Problem:    Closed T12 spinal fracture  Active Problems:    Closed fracture of ramus of left pubis    Closed fracture of second lumbar vertebra    HTN (hypertension)    Type 2 diabetes mellitus      Consults:   Consults     Date and Time Order Name Status Description    12/13/2017 1409 Inpatient Consult to Orthopedic Surgery Completed     12/13/2017 1218 Hospitalist (on-call MD unless specified)            HPI/Hospital Course:  The patient is a 77 y.o. female with a history of DM II, HTN, and osteoporosis who presented to [facility] after falling down stairs at home.  She sustained fractures to her left pubic ramus, L2, and T12.  She was admitted for orthopedic evaluation and pain control.  She was evaluated by spine/orthopedics who recommend brace and pain control, no surgical management.  She was fitted with TLSO brace and has been working with therapy.  Her pain has improved and she has been mobile with a walker.  She was evaluated by ARU for additional rehab but did not meet criteria for admission.  They were offered SNF placement for rehab to home and refused.  The patient and  wish for her to be discharged to home with home health.  They already have a wheelchair and walker.  She requires a hospital bed which has been delivered.  She is stable and discharged to home with home health  "services.    Condition on Discharge:  Stable    Physical Exam on Discharge:  /61 (BP Location: Right arm, Patient Position: Lying)  Pulse 86  Temp 96.1 °F (35.6 °C)  Resp 18  Ht 162.6 cm (64\")  Wt 70.9 kg (156 lb 3.2 oz)  SpO2 99%  BMI 26.81 kg/m2  Physical Exam   Constitutional: She is oriented to person, place, and time. She appears well-developed and well-nourished.   HENT:   Head: Normocephalic and atraumatic.   Cardiovascular: Normal rate, regular rhythm, normal heart sounds and intact distal pulses.    Pulmonary/Chest: Effort normal and breath sounds normal. No respiratory distress.   Abdominal: Soft. Bowel sounds are normal. She exhibits no distension. There is no tenderness.   Musculoskeletal: Normal range of motion. She exhibits no edema.   Neurological: She is alert and oriented to person, place, and time.   Skin: Skin is warm and dry. She is not diaphoretic. No erythema.   Vitals reviewed.    Discharge Disposition:  Home-Health Care [facility]    Discharge Medications:   Susanna Carr   Home Medication Instructions DUANE:[id number]    Printed on:12/19/17 2533   Medication Information                      aspirin 81 MG chewable tablet  Chew 81 mg Daily.             atenolol (TENORMIN) 25 MG tablet  Take 12.5 mg by mouth Daily. Take one-half tablet once daily             Calcium 500-100 MG-UNIT chewable tablet  Chew 1 tablet 2 (Two) Times a Day.             calcium carbonate (OS-SIM) 600 MG tablet  Take 600 mg by mouth Daily.             ferrous sulfate 324 (65 Fe) MG tablet delayed-release EC tablet  Take 324 mg by mouth Daily With Breakfast.             glipiZIDE (GLUCOTROL) 5 MG tablet  Take 5 mg by mouth 2 (Two) Times a Day Before Meals.             hydrochlorothiazide (HYDRODIURIL) 12.5 MG tablet  Take 12.5 mg by mouth Daily.             HYDROcodone-acetaminophen (NORCO) 5-325 MG per tablet  Take 1 tablet by mouth Every 4 (Four) Hours As Needed for Moderate Pain .             lisinopril " (PRINIVIL,ZESTRIL) 2.5 MG tablet  Take 2.5 mg by mouth Daily.             metFORMIN (GLUCOPHAGE) 1000 MG tablet  Take 1,000 mg by mouth 2 (Two) Times a Day With Meals.             methocarbamol (ROBAXIN) 750 MG tablet  Take 1 tablet by mouth Every 8 (Eight) Hours As Needed for Muscle Spasms.             omeprazole (priLOSEC) 20 MG capsule  Take 20 mg by mouth Daily.             sennosides-docusate sodium (SENOKOT-S) 8.6-50 MG tablet  Take 1 tablet by mouth Daily As Needed for Constipation.             simvastatin (ZOCOR) 40 MG tablet  Take 40 mg by mouth Every Night.             vitamin B-12 (CYANOCOBALAMIN) 100 MCG tablet  Take 50 mcg by mouth Daily.                 Discharge Diet:   Diet Instructions     Diet: Regular, Cardiac; Thin       Discharge Diet:   Regular  Cardiac      Fluid Consistency:  Thin                 Activity at Discharge:   Activity Instructions     Activity as Tolerated           Discharge Activity Restrictions       Partial weight bearing on the left, wear TLSO for mobilizing                   Follow-up Appointments:   1. PCP 1 week  Future Appointments  Date Time Provider Department Center   1/8/2018 2:50 PM Cristofer Bauman MD [facility] None       Puneet Haider, DEANNA  12/19/17  2:42 PM    Time: Discharge planning and teaching took greater than 30 minutes.

## 2017-12-19 NOTE — SIGNIFICANT NOTE
12/19/17 1342   Rehab Treatment   Discipline occupational therapy assistant   Treatment Not Performed patient/family declined treatment  (Pt states she is awaiting d/c home as soon as her hospital bed gets to her house, offered ther/ex and ADL to pt, however pt continue to decline, also pt receive visitors further encouraging pt to decline )

## 2017-12-19 NOTE — THERAPY DISCHARGE NOTE
Acute Care - Physical Therapy Discharge Summary  [facility]       Patient Name: Susanna Carr  : 1940  MRN: [id number]    Today's Date: 2017  Onset of Illness/Injury or Date of Surgery Date: 17    Date of Referral to PT: 12/15/17  Referring Physician: Puneet HUERTA      Admit Date: 2017      PT Recommendation and Plan    Visit Dx:    ICD-10-CM ICD-9-CM   1. Pubic ramus fracture, left, closed, initial encounter S32.592A 808.2   2. Other closed fracture of twelfth thoracic vertebra, initial encounter S22.088A 805.2   3. Closed compression fracture of second lumbar vertebra, initial encounter S32.020A 805.4   4. Impaired physical mobility Z74.09 781.99   5. Impaired gait and mobility R26.89 781.2   6. Impaired mobility and ADLs Z74.09 799.89             Outcome Measures       17 0820 17 1100 17 0815    How much help from another person do you currently need...    Turning from your back to your side while in flat bed without using bedrails? 4  -AM  3  -AM    Moving from lying on back to sitting on the side of a flat bed without bedrails? 4  -AM  3  -AM    Moving to and from a bed to a chair (including a wheelchair)? 3  -AM  3  -AM    Standing up from a chair using your arms (e.g., wheelchair, bedside chair)? 3  -AM  3  -AM    Climbing 3-5 steps with a railing? 3  -AM  1  -AM    To walk in hospital room? 3  -AM  3  -AM    AM-PAC 6 Clicks Score 20  -AM  16  -AM    How much help from another is currently needed...    Putting on and taking off regular lower body clothing?  1  -CS (r) PG (t) CS (c)     Bathing (including washing, rinsing, and drying)  2  -CS (r) PG (t) CS (c)     Toileting (which includes using toilet bed pan or urinal)  2  -CS (r) PG (t) CS (c)     Putting on and taking off regular upper body clothing  2  -CS (r) PG (t) CS (c)     Taking care of personal grooming (such as brushing teeth)  3  -CS (r) PG (t) CS (c)     Eating meals  4  -CS (r) PG  (t) CS (c)     Score  14  -CS (r) PG (t)     Functional Assessment    Outcome Measure Options AM-PAC 6 Clicks Basic Mobility (PT)  -AM  AM-PAC 6 Clicks Basic Mobility (PT)  -AM      12/17/17 1010 12/17/17 0840       How much help from another person do you currently need...    Turning from your back to your side while in flat bed without using bedrails? 3  -TW      Moving from lying on back to sitting on the side of a flat bed without bedrails? 2  -TW      Moving to and from a bed to a chair (including a wheelchair)? 3  -TW      Standing up from a chair using your arms (e.g., wheelchair, bedside chair)? 3  -TW      Climbing 3-5 steps with a railing? 2  -TW      To walk in hospital room? 3  -TW      AM-PAC 6 Clicks Score 16  -TW      How much help from another is currently needed...    Putting on and taking off regular lower body clothing?  1  -RC     Bathing (including washing, rinsing, and drying)  2  -RC     Toileting (which includes using toilet bed pan or urinal)  2  -RC     Putting on and taking off regular upper body clothing  2  -RC     Taking care of personal grooming (such as brushing teeth)  3  -RC     Eating meals  4  -RC     Score  14  -RC     Functional Assessment    Outcome Measure Options AM-PAC 6 Clicks Basic Mobility (PT)  -TW        User Key  (r) = Recorded By, (t) = Taken By, (c) = Cosigned By    Initials Name Provider Type    AM Stephane Anderson PTA Physical Therapy Assistant    TW Sergio Jimenez PTA Physical Therapy Assistant    RC SHIRLENE López/L Occupational Therapy Assistant    SHIRLENE Osei/L Occupational Therapy Assistant    BELA Méndez, OT Student OT Student                PT Charges       12/19/17 0820          Time Calculation    Start Time 0820  -AM      Stop Time 0900  -AM      Time Calculation (min) 40 min  -AM      PT Received On 12/19/17  -AM      PT - Next Appointment 12/19/17  -AM      Time Calculation- PT    Total Timed Code Minutes- PT 40  minute(s)  -AM        User Key  (r) = Recorded By, (t) = Taken By, (c) = Cosigned By    Initials Name Provider Type    AM Stephane Anderson, PTA Physical Therapy Assistant                  IP PT Goals       12/19/17 1500 12/19/17 0820 12/18/17 1551    Bed Mobility PT STG    Bed Mobility PT STG, Date Established 12/19/17  -AM      Bed Mobility PT STG, Date Goal Reviewed 12/19/17  -AM 12/19/17  -AM 12/18/17  -RW    Bed Mobility PT STG, Outcome goal not met  -AM goal not met  -AM goal ongoing  -RW    Bed Mobility PT STG, Reason Goal Not Met discharged from facility  -AM      Gait Training PT LTG    Gait Training Goal PT LTG, Date Goal Reviewed  12/19/17  -AM 12/18/17  -RW    Gait Training Goal PT LTG, Outcome  goal met  -AM goal ongoing  -RW    Stair Training PT LTG    Stair Training Goal PT LTG, Date Goal Reviewed  12/19/17  -AM 12/18/17  -RW    Stair Training Goal PT LTG, Outcome  goal met  -AM goal ongoing  -RW    Strength Goal PT LTG    Strength Goal PT LTG, Date Goal Reviewed   12/18/17  -RW    Strength Goal PT LTG, Outcome   goal met  -RW    Physical Therapy PT STG    Physical Therapy PT STG, Date Goal Reviewed  12/19/17  -AM 12/18/17  -RW    Physical Therapy PT STG, Outcome  goal met  -AM goal ongoing  -RW      12/18/17 0815 12/17/17 1010 12/16/17 0900    Bed Mobility PT STG    Bed Mobility PT STG, Date Goal Reviewed  12/17/17  -TW 12/16/17  -LN    Bed Mobility PT STG, Outcome  goal ongoing  -TW goal not met  -LN    Transfer Training PT STG    Transfer Training PT STG, Date Goal Reviewed 12/18/17  -AM 12/17/17  -TW 12/16/17  -LN    Transfer Training PT STG, Outcome goal met  -AM goal ongoing  -TW goal not met  -LN    Gait Training PT LTG    Gait Training Goal PT LTG, Date Goal Reviewed  12/17/17  -TW 12/16/17  -LN    Gait Training Goal PT LTG, Outcome  goal ongoing  -TW goal not met  -LN    Stair Training PT LTG    Stair Training Goal PT LTG, Date Goal Reviewed  12/17/17  -TW 12/16/17  -LN    Stair Training Goal  PT LTG, Outcome  goal ongoing  -TW goal not met  -LN    Strength Goal PT LTG    Strength Goal PT LTG, Date Goal Reviewed   12/16/17  -LN    Strength Goal PT LTG, Outcome  goal ongoing  -TW goal not met  -LN    Physical Therapy PT STG    Physical Therapy PT STG, Date Goal Reviewed  12/17/17  -TW 12/16/17  -LN    Physical Therapy PT STG, Outcome  goal ongoing  -TW goal not met  -LN      12/15/17 1335          Bed Mobility PT STG    Bed Mobility PT STG, Date Established 12/15/17  -CB      Bed Mobility PT STG, Time to Achieve 2 days  -CB      Bed Mobility PT STG, Activity Type roll left/roll right;sidelying to sit/sit to sidelying  -CB      Bed Mobility PT STG, [place] Level contact guard assist  -CB      Bed Mobility PT STG, Additional Goal log rolling with HOB down and no rails,  may assist  -CB      Transfer Training PT STG    Transfer Training PT STG, Date Established 12/15/17  -CB      Transfer Training PT STG, Activity Type bed to chair /chair to bed;sit to stand/stand to sit  -CB      Transfer Training PT STG, [place] Level contact guard assist  -CB      Transfer Training PT STG, Assist Device walker, rolling  -CB      Gait Training PT LTG    Gait Training Goal PT LTG, Date Established 12/15/17  -CB      Gait Training Goal PT LTG, Time to Achieve 2 days  -CB      Gait Training Goal PT LTG, [place] Level contact guard assist  -CB      Gait Training Goal PT LTG, Assist Device walker, rolling  -CB      Gait Training Goal PT LTG, Distance to Achieve 50 feet  -CB      Stair Training PT LTG    Stair Training Goal PT LTG, Date Established 12/15/17  -CB      Stair Training Goal PT LTG, Time to Achieve 2 days  -CB      Stair Training Goal PT LTG, Number of Steps 2  -CB      Stair Training Goal PT LTG, [place] Level contact guard assist  -CB      Stair Training Goal PT LTG, Assist Device walker, rolling  -CB      Strength Goal PT LTG    Strength Goal PT LTG, Date Established 12/15/17  -CB       Strength Goal PT LTG, Time to Achieve 2 days  -CB      Strength Goal PT LTG, Measure to Achieve 10 reps all ex BLE actively  -CB      Physical Therapy PT STG    Physical Therapy PT STG, Date Established 12/15/17  -CB      Physical Therapy PT STG, Time to Achieve 2 days  -CB      Physical Therapy PT STG, Activity Type be able to don and doff TLSO with assist of   -CB      Physical Therapy PT STG, [place] Level independent  -CB      Physical Therapy PT STG, Additional Goal do log rolling and protect spine  -CB        User Key  (r) = Recorded By, (t) = Taken By, (c) = Cosigned By    Initials Name Provider Type    CB Jennifer Agarwal, PT Physical Therapist    AM Stephane Anderson, PTA Physical Therapy Assistant    LN Mary Petit, PTA Physical Therapy Assistant    TW Sergio Jimenez, PTA Physical Therapy Assistant    RW Kody Hayes, PTA Physical Therapy Assistant          Therapy Charges for Today     Code Description Service Date Service Provider Modifiers Qty    29431294015 HC GAIT TRAINING EA 15 MIN 12/18/2017 Stephane Anderson PTA GP 2     Duration:  30m ( 8:45 AM -  9:15 AM)      62598365117 HC PT THER PROC EA 15 MIN 12/18/2017 Stephane Anderson PTA GP 2     Duration:  30m ( 8:15 AM -  8:45 AM)      98923889434 HC GAIT TRAINING EA 15 MIN 12/19/2017 Stephane Anderson PTA GP 1     Duration:  15m ( 8:45 AM -  9:00 AM)      23729379465 HC PT THER PROC EA 15 MIN 12/19/2017 Stephane Anderson PTA GP 2     Duration:  25m ( 8:20 AM -  8:45 AM)            PT Discharge Summary  Anticipated Discharge Disposition: home with home health, home with 24/7 care  Reason for Discharge: Discharge from facility, Per MD order  Outcomes Achieved: Patient able to partially acheive established goals  Discharge Destination: Home with assist, Home with home health      Stephane Anderson PTA   12/19/2017

## 2017-12-20 ENCOUNTER — LAB REQUISITION (OUTPATIENT)
Dept: LAB | Facility: HOSPITAL | Age: 77
End: 2017-12-20

## 2017-12-20 DIAGNOSIS — E11.9 TYPE 2 DIABETES MELLITUS WITHOUT COMPLICATIONS (HCC): ICD-10-CM

## 2017-12-20 LAB — HBA1C MFR BLD: 7.1 % (ref 4–5.6)

## 2017-12-20 PROCEDURE — 83036 HEMOGLOBIN GLYCOSYLATED A1C: CPT | Performed by: FAMILY MEDICINE

## 2017-12-20 NOTE — PLAN OF CARE
Problem: Inpatient Occupational Therapy  Goal: Bed Mobility Goal LTG- OT  Outcome: Unable to achieve outcome(s) by discharge Date Met: 12/20/17 12/16/17 1029 12/20/17 1248   Bed Mobility OT LTG   Bed Mobility OT LTG, Date Established 12/16/17 --    Bed Mobility OT LTG, Time to Achieve by discharge --    Bed Mobility OT LTG, Activity Type all bed mobility --    Bed Mobility OT LTG, [place] Level supervision required  (to engage in ADL) --    Bed Mobility OT LTG, Date Goal Reviewed --  12/20/17   Bed Mobility OT LTG, Outcome --  goal not met   Bed Mobility OT LTG, Reason Goal Not Met --  discharged from facility     Goal: Transfer Training Goal 1 LTG- OT  Outcome: Unable to achieve outcome(s) by discharge Date Met: 12/20/17 12/16/17 1029 12/20/17 1248   Transfer Training OT LTG   Transfer Training OT LTG, Date Established 12/16/17 --    Transfer Training OT LTG, Time to Achieve by discharge --    Transfer Training OT LTG, Activity Type toilet --    Transfer Training OT LTG, [place] Level supervision required --    Transfer Training OT LTG, Date Goal Reviewed --  12/20/17   Transfer Training OT LTG, Outcome --  goal not met   Transfer Training OT LTG, Reason Goal Not Met --  discharged from facility     Goal: Caregiver Training Goal LTG- OT  Outcome: Unable to achieve outcome(s) by discharge Date Met: 12/20/17 12/16/17 1029 12/20/17 1248   Caregiver Training OT LTG   Caregiver Training OT LTG, Date Established 12/16/17 --    Caregiver Training OT LTG, Time to Achieve by discharge --    Caregiver Training OT LTG, Activity Type with home and ADL safety and orthotic management --    Caregiver Training OT LTG, [place] Level able to assist adequately;able to cue patient adequately --    Caregiver Training OT LTG, Date Goal Reviewed --  12/20/17   Caregiver Training OT LTG, Outcome --  goal not met   Caregiver Training OT LTG, Reason Goal Not Met --  discharged from facility     Goal: ADL Goal LTG-  OT  Outcome: Unable to achieve outcome(s) by discharge Date Met: 12/20/17 12/16/17 1029 12/20/17 1248   ADL OT LTG   ADL OT LTG, Date Established 12/16/17 --    ADL OT LTG, Time to Achieve by discharge --    ADL OT LTG, Activity Type ADL skills --    ADL OT LTG, Additional Goal set up feeding/grooming/toileting - min A with UB bath/dressing/brace application and LB dressing/bath. AE/AD as needed. --    ADL OT LTG, Date Goal Reviewed --  12/20/17   ADL OT LTG, Outcome --  goal not met   ADL OT LTG, Reason Goal Not Met --  discharged from facility     Goal: Functional Mobility Goal LTG- OT  Outcome: Unable to achieve outcome(s) by discharge Date Met: 12/20/17 12/16/17 1029 12/20/17 1248   Functional Mobility OT LTG   Functional Mobility Goal OT LTG, Date Established 12/16/17 --    Functional Mobility Goal OT LTG, Time to Achieve by discharge --    Functional Mobility Goal OT LTG, [place] Level standby assist --    Functional Mobility Goal OT LTG, Assist Device rolling walker --    Functional Mobility Goal OT LTG, Distance to Achieve to the sink;to the bathroom  (within precuations/weight bear status) --    Functional Mobility Goal OT LTG, Date Goal Reviewed --  12/20/17   Functional Mobility Goal OT LTG, Outcome --  goal not met   Functional Mobility Goal OT LTG, Reason Goal Not Met --  discharged from facility

## 2017-12-20 NOTE — THERAPY DISCHARGE NOTE
Acute Care - Occupational Therapy Discharge Summary  [facility]     Patient Name: Susanna Carr  : 1940  MRN: [id number]    Today's Date: 2017  Onset of Illness/Injury or Date of Surgery Date: 17    Date of Referral to OT: 12/15/17  Referring Physician: Puneet HUERTA      Admit Date: 2017        OT Recommendation and Plan    Visit Dx:    ICD-10-CM ICD-9-CM   1. Pubic ramus fracture, left, closed, initial encounter S32.592A 808.2   2. Other closed fracture of twelfth thoracic vertebra, initial encounter S22.088A 805.2   3. Closed compression fracture of second lumbar vertebra, initial encounter S32.020A 805.4   4. Impaired physical mobility Z74.09 781.99   5. Impaired gait and mobility R26.89 781.2   6. Impaired mobility and ADLs Z74.09 799.89                     OT Goals       17 1248 17 1113 17 1307    Bed Mobility OT LTG    Bed Mobility OT LTG, Date Goal Reviewed 17  - 17  -CS (r) PG (t) CS (c) 17  -    Bed Mobility OT LTG, Outcome goal not met  -  goal ongoing  -    Bed Mobility OT LTG, Reason Goal Not Met discharged from facility  -      Transfer Training OT LTG    Transfer Training OT LTG, Date Goal Reviewed 17  - 17  -CS (r) PG (t) CS (c) 17  -    Transfer Training OT LTG, Outcome goal not met  -  goal ongoing  -    Transfer Training OT LTG, Reason Goal Not Met discharged from facility  -      Caregiver Training OT LTG    Caregiver Training OT LTG, Date Goal Reviewed 17  - 17  -CS (r) PG (t) CS (c) 17  -    Caregiver Training OT LTG, Outcome goal not met  -  goal ongoing  -    Caregiver Training OT LTG, Reason Goal Not Met discharged from [facility]  -      ADL OT LTG    ADL OT LTG, Date Goal Reviewed 17  - 17  -CS (r) PG (t) CS (c) 17  -    ADL OT LTG, Outcome goal not met  -  goal ongoing  -RC    ADL OT LTG, Reason Goal Not Met discharged from  facility  -      Functional Mobility OT LTG    Functional Mobility Goal OT LTG, Date Goal Reviewed 12/20/17  - 12/18/17  -CS (r) PG (t) CS (c) 12/17/17  -    Functional Mobility Goal OT LTG, Outcome goal not met  -  goal ongoing  -    Functional Mobility Goal OT LTG, Reason Goal Not Met discharged from facility  -        12/16/17 1029          Bed Mobility OT LTG    Bed Mobility OT LTG, Date Established 12/16/17  -      Bed Mobility OT LTG, Time to Achieve by discharge  -      Bed Mobility OT LTG, Activity Type all bed mobility  -      Bed Mobility OT LTG, [place] Level supervision required   to engage in ADL  -      Transfer Training OT LTG    Transfer Training OT LTG, Date Established 12/16/17  -      Transfer Training OT LTG, Time to Achieve by discharge  -      Transfer Training OT LTG, Activity Type toilet  -      Transfer Training OT LTG, [place] Level supervision required  -      Caregiver Training OT LTG    Caregiver Training OT LTG, Date Established 12/16/17  -      Caregiver Training OT LTG, Time to Achieve by discharge  -      Caregiver Training OT LTG, Activity Type with home and ADL safety and orthotic management  -      Caregiver Training OT LTG, [place] Level able to assist adequately;able to cue patient adequately  -      ADL OT LTG    ADL OT LTG, Date Established 12/16/17  -      ADL OT LTG, Time to Achieve by discharge  -      ADL OT LTG, Activity Type ADL skills  -      ADL OT LTG, Additional Goal set up feeding/grooming/toileting - min A with UB bath/dressing/brace application and LB dressing/bath.  AE/AD as needed.  -      Functional Mobility OT LTG    Functional Mobility Goal OT LTG, Date Established 12/16/17  -      Functional Mobility Goal OT LTG, Time to Achieve by discharge  -      Functional Mobility Goal OT LTG, [place] Level standby assist  -      Functional Mobility Goal OT LTG, Assist Device rolling walker  -       Functional Mobility Goal OT LTG, Distance to Achieve to the sink;to the bathroom   within precuations/weight bear status  -        User Key  (r) = Recorded By, (t) = Taken By, (c) = Cosigned By    Initials Name Provider Type     Yakelin Garber, OTR/L Occupational Therapist    LEE Amanda, GARBER/L Occupational Therapy Assistant    DARLENE Castro, GARBER/L Occupational Therapy Assistant    BELA Méndez, OT Student OT Student                Outcome Measures       12/19/17 0820 12/18/17 1100 12/18/17 0815    How much help from another person do you currently need...    Turning from your back to your side while in flat bed without using bedrails? 4  -AM  3  -AM    Moving from lying on back to sitting on the side of a flat bed without bedrails? 4  -AM  3  -AM    Moving to and from a bed to a chair (including a wheelchair)? 3  -AM  3  -AM    Standing up from a chair using your arms (e.g., wheelchair, bedside chair)? 3  -AM  3  -AM    Climbing 3-5 steps with a railing? 3  -AM  1  -AM    To walk in hospital room? 3  -AM  3  -AM    AM-PAC 6 Clicks Score 20  -AM  16  -AM    How much help from another is currently needed...    Putting on and taking off regular lower body clothing?  1  -CS (r) PG (t) CS (c)     Bathing (including washing, rinsing, and drying)  2  -CS (r) PG (t) CS (c)     Toileting (which includes using toilet bed pan or urinal)  2  -CS (r) PG (t) CS (c)     Putting on and taking off regular upper body clothing  2  -CS (r) PG (t) CS (c)     Taking care of personal grooming (such as brushing teeth)  3  -CS (r) PG (t) CS (c)     Eating meals  4  -CS (r) PG (t) CS (c)     Score  14  -CS (r) PG (t)     Functional Assessment    Outcome Measure Options AM-PAC 6 Clicks Basic Mobility (PT)  -AM  AM-PAC 6 Clicks Basic Mobility (PT)  -AM      User Key  (r) = Recorded By, (t) = Taken By, (c) = Cosigned By    Initials Name Provider Type    AM Stephane Anderson, PTA Physical Therapy Assistant    DARLENE WYATT  JARED Castro Occupational Therapy Assistant    PG Sharonda Méndez, OT Student OT Student              OT Discharge Summary  Anticipated Discharge Disposition: home with home health, home with 24/7 care, home with outpatient services, inpatient rehabilitation facility  Reason for Discharge: Discharge from facility, Per MD order  Outcomes Achieved: Refer to plan of care for updates on goals achieved  Discharge Destination: Home with home health      ROGELIO Cadena/YOSELIN  12/20/2017

## 2018-01-05 DIAGNOSIS — M54.5 LOW BACK PAIN, UNSPECIFIED BACK PAIN LATERALITY, UNSPECIFIED CHRONICITY, WITH SCIATICA PRESENCE UNSPECIFIED: Primary | ICD-10-CM

## 2018-01-08 ENCOUNTER — OFFICE VISIT (OUTPATIENT)
Dept: ORTHOPEDIC SURGERY | Facility: CLINIC | Age: 78
End: 2018-01-08

## 2018-01-08 VITALS — BODY MASS INDEX: 25.27 KG/M2 | HEIGHT: 64 IN | WEIGHT: 148 LBS

## 2018-01-08 DIAGNOSIS — S22.089A CLOSED FRACTURE OF TWELFTH THORACIC VERTEBRA, UNSPECIFIED FRACTURE MORPHOLOGY, INITIAL ENCOUNTER (HCC): Primary | ICD-10-CM

## 2018-01-08 DIAGNOSIS — S32.592A CLOSED FRACTURE OF RAMUS OF LEFT PUBIS, INITIAL ENCOUNTER (HCC): ICD-10-CM

## 2018-01-08 DIAGNOSIS — M81.0 OSTEOPOROSIS, SENILE: ICD-10-CM

## 2018-01-08 DIAGNOSIS — S32.029A CLOSED FRACTURE OF SECOND LUMBAR VERTEBRA, UNSPECIFIED FRACTURE MORPHOLOGY, INITIAL ENCOUNTER (HCC): ICD-10-CM

## 2018-01-08 PROCEDURE — 99024 POSTOP FOLLOW-UP VISIT: CPT | Performed by: ORTHOPAEDIC SURGERY

## 2018-01-08 NOTE — PROGRESS NOTES
"Susanna Carr is a 77 y.o. female   Primary provider:  Troy Cheng MD       Chief Complaint   Patient presents with   • Lumbar Spine - Follow-up   • Thoracic Spine - Follow-up   • Pelvis - Follow-up       HISTORY OF PRESENT ILLNESS: Patient is here today following a fall 12/12/2017. Patient went to the ER on 12/13/2017 and was admitted to the hospital. Patient sustained a fracture of the T-12 spine and vertebral body compression at L-2. Also a displaced fracture of the inferior left pubic ramus. Patient states that she is not having any pain at this time. Patient was sent to xray upon arrival.   History of Present Illness.    Wearing brace, states she has tolerated this well.  States pain is improved.     CONCURRENT MEDICAL HISTORY:    Past Medical History:   Diagnosis Date   • Cancer    • Diabetes mellitus    • Hypertension    • Irregular heart beat        Allergies   Allergen Reactions   • Niacin And Related    • Other      Allergic to \"all mycins\"         Current Outpatient Prescriptions:   •  aspirin 81 MG chewable tablet, Chew 81 mg Daily., Disp: , Rfl:   •  atenolol (TENORMIN) 25 MG tablet, Take 12.5 mg by mouth Daily. Take one-half tablet once daily, Disp: , Rfl:   •  Calcium 500-100 MG-UNIT chewable tablet, Chew 1 tablet 2 (Two) Times a Day., Disp: , Rfl:   •  calcium carbonate (OS-SIM) 600 MG tablet, Take 600 mg by mouth Daily., Disp: , Rfl:   •  ferrous sulfate 324 (65 Fe) MG tablet delayed-release EC tablet, Take 324 mg by mouth Daily With Breakfast., Disp: , Rfl:   •  glipiZIDE (GLUCOTROL) 5 MG tablet, Take 5 mg by mouth 2 (Two) Times a Day Before Meals., Disp: , Rfl:   •  hydrochlorothiazide (HYDRODIURIL) 12.5 MG tablet, Take 12.5 mg by mouth Daily., Disp: , Rfl:   •  HYDROcodone-acetaminophen (NORCO) 5-325 MG per tablet, Take 1 tablet by mouth Every 4 (Four) Hours As Needed for Moderate Pain ., Disp: 18 tablet, Rfl: 0  •  lisinopril (PRINIVIL,ZESTRIL) 2.5 MG tablet, Take 2.5 mg by " "mouth Daily., Disp: , Rfl:   •  metFORMIN (GLUCOPHAGE) 1000 MG tablet, Take 1,000 mg by mouth 2 (Two) Times a Day With Meals., Disp: , Rfl:   •  methocarbamol (ROBAXIN) 750 MG tablet, Take 1 tablet by mouth Every 8 (Eight) Hours As Needed for Muscle Spasms., Disp: 30 tablet, Rfl: 0  •  omeprazole (priLOSEC) 20 MG capsule, Take 20 mg by mouth Daily., Disp: , Rfl:   •  sennosides-docusate sodium (SENOKOT-S) 8.6-50 MG tablet, Take 1 tablet by mouth Daily As Needed for Constipation., Disp: 30 tablet, Rfl: 0  •  simvastatin (ZOCOR) 40 MG tablet, Take 40 mg by mouth Every Night., Disp: , Rfl:   •  vitamin B-12 (CYANOCOBALAMIN) 100 MCG tablet, Take 50 mcg by mouth Daily., Disp: , Rfl:     Past Surgical History:   Procedure Laterality Date   • ANKLE SURGERY     • CHOLECYSTECTOMY         No family history on file.     Social History     Social History   • Marital status:      Spouse name: N/A   • Number of children: N/A   • Years of education: N/A     Occupational History   • Not on file.     Social History Main Topics   • Smoking status: Former Smoker   • Smokeless tobacco: Not on file   • Alcohol use No   • Drug use: No   • Sexual activity: Not on file     Other Topics Concern   • Not on file     Social History Narrative        Review of Systems    PHYSICAL EXAMINATION:       Ht 162.6 cm (64\")  Wt 67.1 kg (148 lb)  BMI 25.4 kg/m2    Physical Exam    GAIT:     []  Normal  []  Antalgic    Assistive device: []  None  []  Walker     []  Crutches  []  Cane     [x]  Wheelchair  []  Stretcher    Ortho Exam  In wheelchair  Wearing brace, alignment is maintained well  Moving legs spontaneously.      Xr Spine Lumbar 2 Or 3 Vw    Result Date: 1/8/2018  Narrative: Lumbar spine xray with AP and lateral view bone quality is poor.  Mild kyphosis of the thoracolumbar region, vertebral compression fracture of L2 and T12.  Vertebra collapse is evident of T12 and L2.      Ct Head Without Contrast    Result Date: " 12/13/2017  Narrative: .    EXAMINATION:  Computed Tomography    REGION:  Head         INDICATION:  injury  HISTORY: CORRELATIVE IMAGING:    none  TECHNIQUE:  iv contrast:  no  This exam was performed according to the departmental dose-optimization program which includes automated exposure control, adjustment of the mA and/or kV according to patient size and/or use of iterative reconstruction technique.          COMMENTS:            - atrophy: wnl for age     - cortex:                wnl for age    - deep white mat: wnl for age     - hemorrhage:        none     - fluid collection:  no intra/extra axial fluid collection   - mass / lesion:     no focal parenchymal lesion(s)     - gray/white jxn:  borders preserved       - brain stem:        wnl     - cerebellum:       wnl     - globes / retro:    wnl     - ventricles:         normal size / configuration     - midline shift:     no     - sinuses:             wnl     - mastoids:           wnl      - osseous:             wnl     - misc.: .       Impression: CONCLUSION: 1.  Negative examination for acute intracranial pathology.    Electronically signed by:  YULIYA Soriano MD  12/13/2017 11:49 AM CST Workstation: [id number]    Ct Chest Without Contrast, Ct Abdomen Pelvis Without Contrast    Result Date: 12/13/2017  Narrative: EXAMINATION:  Computed Tomography         REGION:    Chest / Abdomen / Pelvis                 INDICATION:     injury  HISTORY: ZANDRA. IMAGING:    none        TECHNIQUE:    - reconstructions:    axial, coronal, sagittal       - contrast:      oral:  no ;   intravenous:  no   - Please note:     - Lack of IV contrast limits assessment of solid organ parenchyma, urinary system, or vascular structures.     - Lack of oral contrast limits assessment of GI tract structures or peritoneal disease. This exam was performed according to the departmental dose-optimization program which includes automated exposure control, adjustment of the mA and/or kV according to  patient size and/or use of iterative reconstruction technique.      COMMENTS:       PULMONARY PARENCHYMA:       The air spaces are grossly unremarkable.  The pulmonary interstitium are grossly within normal limits for age.       There are no pulmonary nodules or mass.             MEDIASTINUM / JUAN J:       The heart is of normal size and there is no pericardial fluid.   The aorta and great vessels are of normal caliber and configuration for age.   No mediastinal mass or significant adenopathy.         PLEURAL COMPARTMENT:     There is no pleural fluid or air.            MISC:     The inferior neck is negative.   The osseous and body wall are negative.       ABDOMEN:  Limited assessment of the solid organ parenchyma is grossly unremarkable demonstrating no evidence of organomegaly. Status post cholecystectomy Limited assessment of the viscera is grossly unremarkable demonstrating normal caliber bowel loops. No evidence of free fluid or free intraperitoneal air. No jaime fracture. Vertebral body compression deformities of L2, and to a lesser extent at T12. The T12 level may be associated with a transverse fracture, nondisplaced.  Sclerotic centimeter size bone lesion involving L4, likely benign. RETROPERITONEUM: Limited assessment of the kidneys demonstrates overall normal size. Limited assessment of the ureters demonstrates normal caliber and course. The adrenal glands are of normal size and contour. No gross evidence of significant retroperitoneal adenopathy. The vascular structures are grossly within normal limits for age.  PELVIS: Limited assessment of the viscera is grossly unremarkable demonstrating normal caliber bowel loops. No evidence of free fluid or free intraperitoneal air. There is a minimally displaced fracture of the inferior pubic ramus on the left. The vascular structures are grossly within normal limits for age.  .       Impression:  CONCLUSION: 1. Limited examination due to the lack of intravenous  contrast. 2. No gross evidence of vascular or solid organ injury, examination limited in that regard lacking intravenous contrast, which is recommended for trauma assessments. 3. There is a minimally displaced fracture involving the left inferior pubic ramus. 4. Possible acute transverse fracture involving T12 which is nondisplaced. 5. Vertebral body compression deformity of L2, time course uncertain. Electronically signed by:  YULIYA Soriano MD  12/13/2017 12:00 PM CST Workstation: [id number]          ASSESSMENT:    Diagnoses and all orders for this visit:    Closed fracture of twelfth thoracic vertebra, unspecified fracture morphology, initial encounter  -     DEXA Bone Density Appendicular; Future    Closed fracture of second lumbar vertebra, unspecified fracture morphology, initial encounter    Closed fracture of ramus of left pubis, initial encounter    Osteoporosis, senile  -     DEXA Bone Density Appendicular; Future          PLAN    Continue use of brace for another month.  Weight bearing as tolerated.    Check DEXA scan  Cristofer Bauman MD

## 2018-01-15 ENCOUNTER — TELEPHONE (OUTPATIENT)
Dept: ORTHOPEDIC SURGERY | Facility: CLINIC | Age: 78
End: 2018-01-15

## 2018-01-15 NOTE — TELEPHONE ENCOUNTER
Called patient to let her know she has osteoporosis. Patient states that she has been on fosamax and something else but was unable to take.

## 2018-02-19 ENCOUNTER — OFFICE VISIT (OUTPATIENT)
Dept: ORTHOPEDIC SURGERY | Facility: CLINIC | Age: 78
End: 2018-02-19

## 2018-02-19 VITALS — WEIGHT: 148.7 LBS | HEIGHT: 64 IN | BODY MASS INDEX: 25.39 KG/M2

## 2018-02-19 DIAGNOSIS — S22.089D CLOSED FRACTURE OF TWELFTH THORACIC VERTEBRA WITH ROUTINE HEALING, UNSPECIFIED FRACTURE MORPHOLOGY, SUBSEQUENT ENCOUNTER: Primary | ICD-10-CM

## 2018-02-19 DIAGNOSIS — S32.592D CLOSED FRACTURE OF RAMUS OF LEFT PUBIS WITH ROUTINE HEALING, SUBSEQUENT ENCOUNTER: ICD-10-CM

## 2018-02-19 DIAGNOSIS — M54.5 LOW BACK PAIN, UNSPECIFIED BACK PAIN LATERALITY, UNSPECIFIED CHRONICITY, WITH SCIATICA PRESENCE UNSPECIFIED: ICD-10-CM

## 2018-02-19 DIAGNOSIS — S32.029D CLOSED FRACTURE OF SECOND LUMBAR VERTEBRA WITH ROUTINE HEALING, UNSPECIFIED FRACTURE MORPHOLOGY, SUBSEQUENT ENCOUNTER: ICD-10-CM

## 2018-02-19 DIAGNOSIS — M81.0 OSTEOPOROSIS, SENILE: ICD-10-CM

## 2018-02-19 PROCEDURE — 99024 POSTOP FOLLOW-UP VISIT: CPT | Performed by: ORTHOPAEDIC SURGERY

## 2018-02-19 NOTE — PROGRESS NOTES
"Susanna Carr is a 77 y.o. female returns for     Chief Complaint   Patient presents with   • Lumbar Spine - Follow-up       HISTORY OF PRESENT ILLNESS: patient states doing much better.   She has been wearing brace, states she is doing better.  Minimal pain, she has been compliant with wearing of the brace.    States back pain is improving, she is ambulating with a cane.     CONCURRENT MEDICAL HISTORY:    Past Medical History:   Diagnosis Date   • Cancer    • Diabetes mellitus    • Hypertension    • Irregular heart beat        Allergies   Allergen Reactions   • Alendronate Unknown (See Comments)   • Niacin And Related    • Other      Allergic to \"all mycins\"         Current Outpatient Prescriptions:   •  aspirin 81 MG chewable tablet, Chew 81 mg Daily., Disp: , Rfl:   •  atenolol (TENORMIN) 25 MG tablet, Take 12.5 mg by mouth Daily. Take one-half tablet once daily, Disp: , Rfl:   •  Calcium 500-100 MG-UNIT chewable tablet, Chew 1 tablet 2 (Two) Times a Day., Disp: , Rfl:   •  calcium carbonate (OS-SIM) 600 MG tablet, Take 600 mg by mouth Daily., Disp: , Rfl:   •  ferrous sulfate 324 (65 Fe) MG tablet delayed-release EC tablet, Take 324 mg by mouth Daily With Breakfast., Disp: , Rfl:   •  glipiZIDE (GLUCOTROL) 5 MG tablet, Take 5 mg by mouth 2 (Two) Times a Day Before Meals., Disp: , Rfl:   •  hydrochlorothiazide (HYDRODIURIL) 12.5 MG tablet, Take 12.5 mg by mouth Daily., Disp: , Rfl:   •  HYDROcodone-acetaminophen (NORCO) 5-325 MG per tablet, Take 1 tablet by mouth Every 4 (Four) Hours As Needed for Moderate Pain ., Disp: 18 tablet, Rfl: 0  •  lisinopril (PRINIVIL,ZESTRIL) 2.5 MG tablet, Take 2.5 mg by mouth Daily., Disp: , Rfl:   •  metFORMIN (GLUCOPHAGE) 1000 MG tablet, Take 1,000 mg by mouth 2 (Two) Times a Day With Meals., Disp: , Rfl:   •  methocarbamol (ROBAXIN) 750 MG tablet, Take 1 tablet by mouth Every 8 (Eight) Hours As Needed for Muscle Spasms., Disp: 30 tablet, Rfl: 0  •  omeprazole (priLOSEC) 20 MG " "capsule, Take 20 mg by mouth Daily., Disp: , Rfl:   •  sennosides-docusate sodium (SENOKOT-S) 8.6-50 MG tablet, Take 1 tablet by mouth Daily As Needed for Constipation., Disp: 30 tablet, Rfl: 0  •  simvastatin (ZOCOR) 40 MG tablet, Take 40 mg by mouth Every Night., Disp: , Rfl:   •  vitamin B-12 (CYANOCOBALAMIN) 100 MCG tablet, Take 50 mcg by mouth Daily., Disp: , Rfl:     Past Surgical History:   Procedure Laterality Date   • ANKLE SURGERY     • CHOLECYSTECTOMY         ROS  No fevers or chills.  No chest pain or shortness of air.  No GI or  disturbances.    PHYSICAL EXAMINATION:       Ht 162.6 cm (64\")  Wt 67.4 kg (148 lb 11.2 oz)  BMI 25.52 kg/m2    Physical Exam    GAIT:     []  Normal  []  Antalgic    Assistive device: []  None  []  Walker     []  Crutches  []  Cane     []  Wheelchair  []  Stretcher    Ortho Exam  Ambulating well with a cane.  Wearing TLSO.  No pain with palpation of the thoracolumbar region.       No results found.    I reviewed the DEXA SCAN, which shows osteoporosis, -3 to -4      ASSESSMENT:    Diagnoses and all orders for this visit:    Closed fracture of twelfth thoracic vertebra with routine healing, unspecified fracture morphology, subsequent encounter    Closed fracture of second lumbar vertebra with routine healing, unspecified fracture morphology, subsequent encounter    Closed fracture of ramus of left pubis with routine healing, subsequent encounter    Osteoporosis, senile    Low back pain, unspecified back pain laterality, unspecified chronicity, with sciatica presence unspecified          PLAN    Ambulation as tolerated, discontinue use of TLSO, except for when she is outside the home.  I advised her to take forteo or prolia, information is provided for her to review and she will consider these medications for osteoporosis.        Cristofer Bauman MD  "

## 2020-09-15 ENCOUNTER — LAB (OUTPATIENT)
Dept: LAB | Facility: HOSPITAL | Age: 80
End: 2020-09-15

## 2020-09-15 PROCEDURE — U0003 INFECTIOUS AGENT DETECTION BY NUCLEIC ACID (DNA OR RNA); SEVERE ACUTE RESPIRATORY SYNDROME CORONAVIRUS 2 (SARS-COV-2) (CORONAVIRUS DISEASE [COVID-19]), AMPLIFIED PROBE TECHNIQUE, MAKING USE OF HIGH THROUGHPUT TECHNOLOGIES AS DESCRIBED BY CMS-2020-01-R: HCPCS | Performed by: OPHTHALMOLOGY

## 2020-09-15 PROCEDURE — C9803 HOPD COVID-19 SPEC COLLECT: HCPCS | Performed by: OPHTHALMOLOGY

## 2020-09-16 LAB
COVID LABCORP PRIORITY: NORMAL
SARS-COV-2 RNA RESP QL NAA+PROBE: NOT DETECTED

## 2020-09-18 ENCOUNTER — ANESTHESIA EVENT (OUTPATIENT)
Dept: PERIOP | Facility: HOSPITAL | Age: 80
End: 2020-09-18

## 2020-09-18 ENCOUNTER — HOSPITAL ENCOUNTER (OUTPATIENT)
Facility: HOSPITAL | Age: 80
Setting detail: HOSPITAL OUTPATIENT SURGERY
Discharge: HOME | End: 2020-09-18
Attending: OPHTHALMOLOGY | Admitting: OPHTHALMOLOGY

## 2020-09-18 ENCOUNTER — ANESTHESIA (OUTPATIENT)
Dept: PERIOP | Facility: HOSPITAL | Age: 80
End: 2020-09-18

## 2020-09-18 VITALS
HEIGHT: 61 IN | BODY MASS INDEX: 27.06 KG/M2 | SYSTOLIC BLOOD PRESSURE: 130 MMHG | WEIGHT: 143.3 LBS | OXYGEN SATURATION: 100 % | RESPIRATION RATE: 18 BRPM | TEMPERATURE: 97 F | HEART RATE: 79 BPM | DIASTOLIC BLOOD PRESSURE: 60 MMHG

## 2020-09-18 LAB — GLUCOSE BLDC GLUCOMTR-MCNC: 131 MG/DL (ref 70–130)

## 2020-09-18 PROCEDURE — 82962 GLUCOSE BLOOD TEST: CPT

## 2020-09-18 PROCEDURE — 25010000002 MIDAZOLAM PER 1 MG: Performed by: NURSE ANESTHETIST, CERTIFIED REGISTERED

## 2020-09-18 PROCEDURE — 25010000002 VANCOMYCIN 1 G RECONSTITUTED SOLUTION 1 EACH VIAL: Performed by: OPHTHALMOLOGY

## 2020-09-18 PROCEDURE — 25010000002 EPINEPHRINE PER 0.1 MG: Performed by: OPHTHALMOLOGY

## 2020-09-18 PROCEDURE — V2632 POST CHMBR INTRAOCULAR LENS: HCPCS | Performed by: OPHTHALMOLOGY

## 2020-09-18 DEVICE — LENS ACRYSOF IQ 6X13MM SN60WF 22.5: Type: IMPLANTABLE DEVICE | Site: EYE | Status: FUNCTIONAL

## 2020-09-18 RX ORDER — MIDAZOLAM HYDROCHLORIDE 1 MG/ML
INJECTION INTRAMUSCULAR; INTRAVENOUS AS NEEDED
Status: DISCONTINUED | OUTPATIENT
Start: 2020-09-18 | End: 2020-09-18 | Stop reason: SURG

## 2020-09-18 RX ORDER — MOXIFLOXACIN 5 MG/ML
SOLUTION/ DROPS OPHTHALMIC AS NEEDED
Status: DISCONTINUED | OUTPATIENT
Start: 2020-09-18 | End: 2020-09-18 | Stop reason: HOSPADM

## 2020-09-18 RX ORDER — TETRACAINE HYDROCHLORIDE 5 MG/ML
SOLUTION OPHTHALMIC AS NEEDED
Status: DISCONTINUED | OUTPATIENT
Start: 2020-09-18 | End: 2020-09-18 | Stop reason: HOSPADM

## 2020-09-18 RX ORDER — PHENYLEPHRINE HCL 2.5 %
1 DROPS OPHTHALMIC (EYE)
Status: COMPLETED | OUTPATIENT
Start: 2020-09-18 | End: 2020-09-18

## 2020-09-18 RX ORDER — SODIUM CHLORIDE 0.9 % (FLUSH) 0.9 %
10 SYRINGE (ML) INJECTION AS NEEDED
Status: DISCONTINUED | OUTPATIENT
Start: 2020-09-18 | End: 2020-09-18 | Stop reason: HOSPADM

## 2020-09-18 RX ORDER — PREDNISOLONE ACETATE 10 MG/ML
SUSPENSION/ DROPS OPHTHALMIC AS NEEDED
Status: DISCONTINUED | OUTPATIENT
Start: 2020-09-18 | End: 2020-09-18 | Stop reason: HOSPADM

## 2020-09-18 RX ORDER — CYCLOPENTOLATE HYDROCHLORIDE 10 MG/ML
1 SOLUTION/ DROPS OPHTHALMIC
Status: COMPLETED | OUTPATIENT
Start: 2020-09-18 | End: 2020-09-18

## 2020-09-18 RX ORDER — TETRACAINE HYDROCHLORIDE 5 MG/ML
1 SOLUTION OPHTHALMIC
Status: COMPLETED | OUTPATIENT
Start: 2020-09-18 | End: 2020-09-18

## 2020-09-18 RX ORDER — BRIMONIDINE TARTRATE 0.15 %
DROPS OPHTHALMIC (EYE) AS NEEDED
Status: DISCONTINUED | OUTPATIENT
Start: 2020-09-18 | End: 2020-09-18 | Stop reason: HOSPADM

## 2020-09-18 RX ADMIN — CYCLOPENTOLATE HYDROCHLORIDE 1 DROP: 10 SOLUTION/ DROPS OPHTHALMIC at 06:29

## 2020-09-18 RX ADMIN — TETRACAINE HYDROCHLORIDE 1 DROP: 5 SOLUTION OPHTHALMIC at 06:49

## 2020-09-18 RX ADMIN — PHENYLEPHRINE HYDROCHLORIDE 1 DROP: 25 SOLUTION/ DROPS OPHTHALMIC at 06:49

## 2020-09-18 RX ADMIN — MIDAZOLAM HYDROCHLORIDE 1 MG: 2 INJECTION, SOLUTION INTRAMUSCULAR; INTRAVENOUS at 08:00

## 2020-09-18 RX ADMIN — PHENYLEPHRINE HYDROCHLORIDE 1 DROP: 25 SOLUTION/ DROPS OPHTHALMIC at 06:39

## 2020-09-18 RX ADMIN — TETRACAINE HYDROCHLORIDE 1 DROP: 5 SOLUTION OPHTHALMIC at 06:29

## 2020-09-18 RX ADMIN — CYCLOPENTOLATE HYDROCHLORIDE 1 DROP: 10 SOLUTION/ DROPS OPHTHALMIC at 06:39

## 2020-09-18 RX ADMIN — PHENYLEPHRINE HYDROCHLORIDE 1 DROP: 25 SOLUTION/ DROPS OPHTHALMIC at 06:29

## 2020-09-18 RX ADMIN — CYCLOPENTOLATE HYDROCHLORIDE 1 DROP: 10 SOLUTION/ DROPS OPHTHALMIC at 06:49

## 2020-09-18 NOTE — ANESTHESIA POSTPROCEDURE EVALUATION
Patient: Susanna Carr    Procedure Summary     Date: 09/18/20 Room / Location: [facility] OR 06 / [facility] OR    Anesthesia Start: 0801 Anesthesia Stop: 0814    Procedure: REMOVE CATARACT AND IMPLANT  INTRAOCULAR LENS (Left Eye) Diagnosis:       Age-related nuclear cataract of left eye      (age-related nuclear cataract of left eye)    Surgeon: Juan Tineo MD Provider: Kenneth Hayes CRNA    Anesthesia Type: MAC ASA Status: 3          Anesthesia Type: MAC    Vitals  No vitals data found for the desired time range.          Post Anesthesia Care and Evaluation    Patient location during evaluation: bedside  Patient participation: complete - patient participated  Level of consciousness: awake and alert  Pain score: 0  Pain management: adequate  Airway patency: patent  Anesthetic complications: No anesthetic complications  PONV Status: none  Cardiovascular status: acceptable  Respiratory status: acceptable  Hydration status: acceptable

## 2020-09-18 NOTE — ANESTHESIA PREPROCEDURE EVALUATION
Anesthesia Evaluation     Patient summary reviewed   NPO Solid Status: > 8 hours             Airway   Mallampati: II  Neck ROM: full  Dental    (+) poor dentition    Pulmonary - normal exam   (+) a smoker, COPD,   Cardiovascular   Exercise tolerance: good (4-7 METS)    NYHA Classification: II  ECG reviewed    (+) hypertension, BLACKMAN, murmur, hyperlipidemia,       Neuro/Psych  (+) psychiatric history Anxiety,     GI/Hepatic/Renal/Endo    (+)   diabetes mellitus,     Musculoskeletal     (+) back pain,   Abdominal    Substance History      OB/GYN          Other   arthritis,    history of cancer                    Anesthesia Plan    ASA 3     MAC     intravenous induction     Anesthetic plan, all risks, benefits, and alternatives have been provided, discussed and informed consent has been obtained with: patient.

## 2020-09-29 ENCOUNTER — LAB (OUTPATIENT)
Dept: LAB | Facility: HOSPITAL | Age: 80
End: 2020-09-29

## 2020-09-29 PROCEDURE — C9803 HOPD COVID-19 SPEC COLLECT: HCPCS | Performed by: OPHTHALMOLOGY

## 2020-09-29 PROCEDURE — U0003 INFECTIOUS AGENT DETECTION BY NUCLEIC ACID (DNA OR RNA); SEVERE ACUTE RESPIRATORY SYNDROME CORONAVIRUS 2 (SARS-COV-2) (CORONAVIRUS DISEASE [COVID-19]), AMPLIFIED PROBE TECHNIQUE, MAKING USE OF HIGH THROUGHPUT TECHNOLOGIES AS DESCRIBED BY CMS-2020-01-R: HCPCS | Performed by: OPHTHALMOLOGY

## 2020-09-30 LAB
COVID LABCORP PRIORITY: NORMAL
SARS-COV-2 RNA RESP QL NAA+PROBE: NOT DETECTED

## 2020-10-02 ENCOUNTER — ANESTHESIA EVENT (OUTPATIENT)
Dept: PERIOP | Facility: HOSPITAL | Age: 80
End: 2020-10-02

## 2020-10-02 ENCOUNTER — ANESTHESIA (OUTPATIENT)
Dept: PERIOP | Facility: HOSPITAL | Age: 80
End: 2020-10-02

## 2020-10-02 ENCOUNTER — HOSPITAL ENCOUNTER (OUTPATIENT)
Facility: HOSPITAL | Age: 80
Setting detail: HOSPITAL OUTPATIENT SURGERY
Discharge: HOME | End: 2020-10-02
Attending: OPHTHALMOLOGY | Admitting: OPHTHALMOLOGY

## 2020-10-02 VITALS
HEIGHT: 61 IN | HEART RATE: 71 BPM | OXYGEN SATURATION: 95 % | BODY MASS INDEX: 27.26 KG/M2 | RESPIRATION RATE: 20 BRPM | DIASTOLIC BLOOD PRESSURE: 57 MMHG | SYSTOLIC BLOOD PRESSURE: 121 MMHG | WEIGHT: 144.4 LBS | TEMPERATURE: 97.8 F

## 2020-10-02 LAB — GLUCOSE BLDC GLUCOMTR-MCNC: 123 MG/DL (ref 70–130)

## 2020-10-02 PROCEDURE — 25010000002 VANCOMYCIN 1 G RECONSTITUTED SOLUTION 1 EACH VIAL: Performed by: OPHTHALMOLOGY

## 2020-10-02 PROCEDURE — 82962 GLUCOSE BLOOD TEST: CPT

## 2020-10-02 PROCEDURE — 25010000002 EPINEPHRINE PER 0.1 MG: Performed by: OPHTHALMOLOGY

## 2020-10-02 PROCEDURE — 25010000002 MIDAZOLAM PER 1 MG: Performed by: NURSE ANESTHETIST, CERTIFIED REGISTERED

## 2020-10-02 PROCEDURE — 25010000002 FENTANYL CITRATE (PF) 100 MCG/2ML SOLUTION: Performed by: NURSE ANESTHETIST, CERTIFIED REGISTERED

## 2020-10-02 PROCEDURE — V2632 POST CHMBR INTRAOCULAR LENS: HCPCS | Performed by: OPHTHALMOLOGY

## 2020-10-02 DEVICE — LENS ACRYSOF IQ 6X13MM SN60WF 22.5: Type: IMPLANTABLE DEVICE | Site: EYE | Status: FUNCTIONAL

## 2020-10-02 RX ORDER — PREDNISOLONE ACETATE 10 MG/ML
SUSPENSION/ DROPS OPHTHALMIC AS NEEDED
Status: DISCONTINUED | OUTPATIENT
Start: 2020-10-02 | End: 2020-10-02 | Stop reason: HOSPADM

## 2020-10-02 RX ORDER — BRIMONIDINE TARTRATE 0.15 %
DROPS OPHTHALMIC (EYE) AS NEEDED
Status: DISCONTINUED | OUTPATIENT
Start: 2020-10-02 | End: 2020-10-02 | Stop reason: HOSPADM

## 2020-10-02 RX ORDER — TETRACAINE HYDROCHLORIDE 5 MG/ML
1 SOLUTION OPHTHALMIC AS NEEDED
Status: COMPLETED | OUTPATIENT
Start: 2020-10-02 | End: 2020-10-02

## 2020-10-02 RX ORDER — FENTANYL CITRATE 50 UG/ML
INJECTION, SOLUTION INTRAMUSCULAR; INTRAVENOUS AS NEEDED
Status: DISCONTINUED | OUTPATIENT
Start: 2020-10-02 | End: 2020-10-02 | Stop reason: SURG

## 2020-10-02 RX ORDER — MIDAZOLAM HYDROCHLORIDE 1 MG/ML
INJECTION INTRAMUSCULAR; INTRAVENOUS AS NEEDED
Status: DISCONTINUED | OUTPATIENT
Start: 2020-10-02 | End: 2020-10-02 | Stop reason: SURG

## 2020-10-02 RX ORDER — MOXIFLOXACIN 5 MG/ML
SOLUTION/ DROPS OPHTHALMIC AS NEEDED
Status: DISCONTINUED | OUTPATIENT
Start: 2020-10-02 | End: 2020-10-02 | Stop reason: HOSPADM

## 2020-10-02 RX ORDER — CYCLOPENTOLATE HYDROCHLORIDE 20 MG/ML
1 SOLUTION/ DROPS OPHTHALMIC
Status: COMPLETED | OUTPATIENT
Start: 2020-10-02 | End: 2020-10-02

## 2020-10-02 RX ORDER — ONDANSETRON 2 MG/ML
4 INJECTION INTRAMUSCULAR; INTRAVENOUS ONCE AS NEEDED
Status: DISCONTINUED | OUTPATIENT
Start: 2020-10-02 | End: 2020-10-02 | Stop reason: HOSPADM

## 2020-10-02 RX ORDER — TETRACAINE HYDROCHLORIDE 5 MG/ML
SOLUTION OPHTHALMIC AS NEEDED
Status: DISCONTINUED | OUTPATIENT
Start: 2020-10-02 | End: 2020-10-02 | Stop reason: HOSPADM

## 2020-10-02 RX ORDER — SODIUM CHLORIDE 0.9 % (FLUSH) 0.9 %
10 SYRINGE (ML) INJECTION AS NEEDED
Status: DISCONTINUED | OUTPATIENT
Start: 2020-10-02 | End: 2020-10-02 | Stop reason: HOSPADM

## 2020-10-02 RX ORDER — PHENYLEPHRINE HCL 2.5 %
1 DROPS OPHTHALMIC (EYE)
Status: COMPLETED | OUTPATIENT
Start: 2020-10-02 | End: 2020-10-02

## 2020-10-02 RX ADMIN — CYCLOPENTOLATE HYDROCHLORIDE 1 DROP: 20 SOLUTION/ DROPS OPHTHALMIC at 07:21

## 2020-10-02 RX ADMIN — PHENYLEPHRINE HYDROCHLORIDE 1 DROP: 25 SOLUTION/ DROPS OPHTHALMIC at 07:21

## 2020-10-02 RX ADMIN — PHENYLEPHRINE HYDROCHLORIDE 1 DROP: 25 SOLUTION/ DROPS OPHTHALMIC at 07:31

## 2020-10-02 RX ADMIN — CYCLOPENTOLATE HYDROCHLORIDE 1 DROP: 20 SOLUTION/ DROPS OPHTHALMIC at 07:44

## 2020-10-02 RX ADMIN — TETRACAINE HYDROCHLORIDE 1 DROP: 5 SOLUTION OPHTHALMIC at 07:21

## 2020-10-02 RX ADMIN — TETRACAINE HYDROCHLORIDE 1 DROP: 5 SOLUTION OPHTHALMIC at 07:43

## 2020-10-02 RX ADMIN — PHENYLEPHRINE HYDROCHLORIDE 1 DROP: 25 SOLUTION/ DROPS OPHTHALMIC at 07:43

## 2020-10-02 RX ADMIN — Medication 5 ML: at 09:03

## 2020-10-02 RX ADMIN — FENTANYL CITRATE 50 MCG: 50 INJECTION, SOLUTION INTRAMUSCULAR; INTRAVENOUS at 09:03

## 2020-10-02 RX ADMIN — MIDAZOLAM HYDROCHLORIDE 0.5 MG: 2 INJECTION, SOLUTION INTRAMUSCULAR; INTRAVENOUS at 09:05

## 2020-10-02 RX ADMIN — MIDAZOLAM HYDROCHLORIDE 0.5 MG: 2 INJECTION, SOLUTION INTRAMUSCULAR; INTRAVENOUS at 09:01

## 2020-10-02 RX ADMIN — CYCLOPENTOLATE HYDROCHLORIDE 1 DROP: 20 SOLUTION/ DROPS OPHTHALMIC at 07:31

## 2020-10-02 RX ADMIN — Medication 5 ML: at 09:05

## 2020-10-02 NOTE — ANESTHESIA PREPROCEDURE EVALUATION
Anesthesia Evaluation     Patient summary reviewed and Nursing notes reviewed   NPO Solid Status: > 8 hours  NPO Liquid Status: > 8 hours           Airway   Mallampati: II  Neck ROM: full  Dental    (+) poor dentition    Pulmonary     breath sounds clear to auscultation  (+) a smoker Former,   Cardiovascular   Exercise tolerance: good (4-7 METS)    NYHA Classification: II  Patient on routine beta blocker and Beta blocker given within 24 hours of surgery  Rhythm: regular  Rate: normal    (+) hypertension, murmur, hyperlipidemia,       Neuro/Psych- negative ROS  GI/Hepatic/Renal/Endo    (+)  GERD well controlled,  diabetes mellitus type 2,     Musculoskeletal (-) negative ROS    Abdominal    Substance History - negative use     OB/GYN negative ob/gyn ROS         Other - negative ROS                         Anesthesia Plan    ASA 3     MAC     intravenous induction     Anesthetic plan, all risks, benefits, and alternatives have been provided, discussed and informed consent has been obtained with: patient.

## 2020-10-02 NOTE — ANESTHESIA POSTPROCEDURE EVALUATION
Patient: Susanna Carr    Procedure Summary     Date: 10/02/20 Room / Location: [facility] OR 06 / [facility] OR    Anesthesia Start: 0903 Anesthesia Stop: 0924    Procedure: REMOVE CATARACT AND IMPLANT INTRAOCULAR LENS (Right Eye) Diagnosis:       Age-related nuclear cataract of right eye      (age-related nuclear cataract of right eye)    Surgeon: Juan Tineo MD Provider: Klever Dewitt MD    Anesthesia Type: MAC ASA Status: 3          Anesthesia Type: MAC    Vitals  No vitals data found for the desired time range.          Post Anesthesia Care and Evaluation    Patient location during evaluation: PHASE II  Patient participation: complete - patient participated  Level of consciousness: awake  Pain score: 0  Pain management: adequate  Airway patency: patent  Anesthetic complications: No anesthetic complications  PONV Status: none  Cardiovascular status: acceptable  Respiratory status: acceptable  Hydration status: acceptable

## 2020-11-21 ENCOUNTER — APPOINTMENT (OUTPATIENT)
Dept: CT IMAGING | Facility: HOSPITAL | Age: 80
End: 2020-11-21

## 2020-11-21 ENCOUNTER — HOSPITAL ENCOUNTER (EMERGENCY)
Facility: HOSPITAL | Age: 80
Discharge: HOME | End: 2020-11-21
Attending: FAMILY MEDICINE | Admitting: FAMILY MEDICINE

## 2020-11-21 VITALS
HEIGHT: 61 IN | DIASTOLIC BLOOD PRESSURE: 62 MMHG | OXYGEN SATURATION: 96 % | RESPIRATION RATE: 18 BRPM | TEMPERATURE: 98.2 F | SYSTOLIC BLOOD PRESSURE: 137 MMHG | BODY MASS INDEX: 27.19 KG/M2 | HEART RATE: 94 BPM | WEIGHT: 144 LBS
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
## 2020-11-21 DIAGNOSIS — H05.232 PERIORBITAL HEMATOMA OF LEFT EYE: Primary | ICD-10-CM

## 2020-11-21 LAB
HOLD SPECIMEN: NORMAL
HOLD SPECIMEN: NORMAL
INR PPP: 0.98 (ref 0.8–1.2)
PROTHROMBIN TIME: 13.4 SECONDS (ref 11.1–15.3)
WHOLE BLOOD HOLD SPECIMEN: NORMAL

## 2020-11-21 PROCEDURE — 85610 PROTHROMBIN TIME: CPT | Performed by: FAMILY MEDICINE

## 2020-11-21 PROCEDURE — 99283 EMERGENCY DEPT VISIT LOW MDM: CPT

## 2020-11-21 PROCEDURE — 70486 CT MAXILLOFACIAL W/O DYE: CPT

## 2020-11-21 PROCEDURE — 70450 CT HEAD/BRAIN W/O DYE: CPT

## 2020-11-21 NOTE — ED PROVIDER NOTES
"Subjective     History provided by:  Patient   used: No    Patient is a 80 years old female who presented here today because of a fall that happened yesterday.  She said that she lost her balance and hit her face on the floor.  She is on Coumadin.  Denies any trauma to any other part of the body.  Complained having some left eye pain and swelling.  And some  Discoloration.      ion.Review of Systems   All other systems reviewed and are negative.      Past Medical History:   Diagnosis Date   • Cataract    • Diabetes mellitus (CMS/HCC)    • Hyperlipidemia    • Hypertension    • Irregular heart beat    • Osteoporosis    • Skin cancer    • Wears dentures     upper and lower       Allergies   Allergen Reactions   • Alendronate Unknown (See Comments)     \"gives me chest pain\"   • Latex Other (See Comments)     irritate skin   • Niacin And Related Hives   • Other Hives     Allergic to \"all mycins\"       Past Surgical History:   Procedure Laterality Date   • CATARACT EXTRACTION W/ INTRAOCULAR LENS IMPLANT Left 2020    Procedure: REMOVE CATARACT AND IMPLANT  INTRAOCULAR LENS;  Surgeon: Juan Tineo MD;  Location: [facility];  Service: Ophthalmology;  Laterality: Left;   • CATARACT EXTRACTION W/ INTRAOCULAR LENS IMPLANT Right 10/2/2020    Procedure: REMOVE CATARACT AND IMPLANT INTRAOCULAR LENS;  Surgeon: Juan Tineo MD;  Location: [facility];  Service: Ophthalmology;  Laterality: Right;   • CHOLECYSTECTOMY     • ORIF ANKLE FRACTURE Left        History reviewed. No pertinent family history.    Social History     Socioeconomic History   • Marital status:      Spouse name: Not on file   • Number of children: Not on file   • Years of education: Not on file   • Highest education level: Not on file   Tobacco Use   • Smoking status: Former Smoker     Quit date:      Years since quittin.8   • Smokeless tobacco: Never Used   Substance and Sexual Activity   • Alcohol use: No " "  • Drug use: No   • Sexual activity: Defer       /62 (BP Location: Right arm, Patient Position: Sitting)   Pulse 94   Temp 98.2 °F (36.8 °C) (Infrared)   Resp 18   Ht 154.9 cm (61\")   Wt 65.3 kg (144 lb)   SpO2 96%   BMI 27.21 kg/m²     Objective   Physical Exam  Vitals signs and nursing note reviewed.   Constitutional:       Appearance: Normal appearance. She is normal weight.   HENT:      Head: Normocephalic and atraumatic.      Right Ear: Tympanic membrane, ear canal and external ear normal.      Left Ear: Tympanic membrane, ear canal and external ear normal.      Nose: Nose normal.   Eyes:      General:         Right eye: No foreign body or discharge.         Left eye: No foreign body or discharge.      Extraocular Movements: Extraocular movements intact.      Right eye: Normal extraocular motion.      Left eye: Normal extraocular motion.      Conjunctiva/sclera: Conjunctivae normal.      Pupils: Pupils are equal, round, and reactive to light.   Neck:      Musculoskeletal: Normal range of motion and neck supple.   Cardiovascular:      Rate and Rhythm: Normal rate and regular rhythm.      Pulses: Normal pulses.      Heart sounds: Normal heart sounds.   Pulmonary:      Effort: Pulmonary effort is normal.      Breath sounds: Normal breath sounds.   Abdominal:      General: Abdomen is flat. Bowel sounds are normal.      Palpations: Abdomen is soft.   Musculoskeletal: Normal range of motion.   Skin:     Capillary Refill: Capillary refill takes less than 2 seconds.      Findings: Bruising, ecchymosis and erythema present.          Neurological:      General: No focal deficit present.      Mental Status: She is alert and oriented to person, place, and time.         Procedures           ED Course           Labs Reviewed   PROTIME-INR - Normal    Narrative:     Therapeutic range for most indications is 2.0-3.0 INR,  or 2.5-3.5 for mechanical heart valves.   EXTRA TUBES    Narrative:     The following orders " were created for panel order Extra Tubes.  Procedure                               Abnormality         Status                     ---------                               -----------         ------                     Lavender Top[[id number]]                                     In process                 Gold Top - SST[[id number]]                                   In process                 Green Top (Gel)[[id number]]                                  In process                   Please view results for these tests on the individual orders.   LAVENDER TOP   GOLD TOP - SST   GREEN TOP       CT Head Without Contrast   Final Result   1. No acute intracranial abnormality.   2. Left periorbital/supraorbital and left frontal scalp hematoma      If pain or symptoms persist beyond reasonable expectations, an   MRI examination is suggested as is deemed clinically appropriate.      Electronically signed by:  Crystal Ordaz MD  11/21/2020 12:52 PM   CST Workstation: [id number]      CT Facial Bones Without Contrast   Final Result   1. No evidence of acute traumatic osseous injury.    2. Left-sided soft tissue swelling/subcutaneous stranding/small   scalp hematoma, as above   3. Mild chronic appearing maxillary sinusitis         Electronically signed by:  Crystal Ordaz MD  11/21/2020 1:03 PM   CST Workstation: [id number]                                      [facility]    Final diagnoses:   Periorbital hematoma of left eye   Fall, initial encounter            George Rea MD  11/21/20 [id number]

## 2021-02-10 ENCOUNTER — IMMUNIZATION (OUTPATIENT)
Dept: VACCINE CLINIC | Facility: HOSPITAL | Age: 81
End: 2021-02-10

## 2021-02-10 PROCEDURE — 91300 HC SARSCOV02 VAC 30MCG/0.3ML IM: CPT | Performed by: THORACIC SURGERY (CARDIOTHORACIC VASCULAR SURGERY)

## 2021-02-10 PROCEDURE — 0001A: CPT | Performed by: THORACIC SURGERY (CARDIOTHORACIC VASCULAR SURGERY)

## 2021-03-03 ENCOUNTER — IMMUNIZATION (OUTPATIENT)
Dept: VACCINE CLINIC | Facility: HOSPITAL | Age: 81
End: 2021-03-03

## 2021-03-03 PROCEDURE — 91300 HC SARSCOV02 VAC 30MCG/0.3ML IM: CPT | Performed by: NURSE PRACTITIONER

## 2021-03-03 PROCEDURE — 0002A: CPT | Performed by: NURSE PRACTITIONER

## 2022-03-25 ENCOUNTER — HOSPITAL ENCOUNTER (OUTPATIENT)
Age: 82
Setting detail: SPECIMEN
Discharge: HOME | End: 2022-03-25
Payer: MEDICARE

## 2022-03-25 PROCEDURE — 88305 TISSUE EXAM BY PATHOLOGIST: CPT

## 2023-05-29 ENCOUNTER — APPOINTMENT (OUTPATIENT)
Dept: GENERAL RADIOLOGY | Facility: HOSPITAL | Age: 83
DRG: 482 | End: 2023-05-29
Payer: MEDICARE

## 2023-05-29 ENCOUNTER — HOSPITAL ENCOUNTER (INPATIENT)
Facility: HOSPITAL | Age: 83
LOS: 3 days | Discharge: HOME HEALTH | DRG: 482 | End: 2023-06-02
Attending: EMERGENCY MEDICINE | Admitting: HOSPITALIST
Payer: MEDICARE

## 2023-05-29 DIAGNOSIS — S72.002A CLOSED FRACTURE OF LEFT HIP, INITIAL ENCOUNTER: Primary | ICD-10-CM

## 2023-05-29 DIAGNOSIS — Z74.09 IMPAIRED FUNCTIONAL MOBILITY, BALANCE, GAIT, AND ENDURANCE: ICD-10-CM

## 2023-05-29 DIAGNOSIS — Z74.09 IMPAIRED MOBILITY AND ADLS: ICD-10-CM

## 2023-05-29 DIAGNOSIS — Z78.9 IMPAIRED MOBILITY AND ADLS: ICD-10-CM

## 2023-05-29 PROBLEM — S72.009A HIP FRACTURE: Status: ACTIVE | Noted: 2023-05-29

## 2023-05-29 LAB
ALBUMIN SERPL-MCNC: 4.3 G/DL (ref 3.5–5.2)
ALBUMIN/GLOB SERPL: 1.7 G/DL
ALP SERPL-CCNC: 68 U/L (ref 39–117)
ALT SERPL W P-5'-P-CCNC: 15 U/L (ref 1–33)
ANION GAP SERPL CALCULATED.3IONS-SCNC: 18 MMOL/L (ref 5–15)
APTT PPP: 28 SECONDS (ref 20–40.3)
AST SERPL-CCNC: 19 U/L (ref 1–32)
BACTERIA UR QL AUTO: ABNORMAL /HPF
BASOPHILS # BLD AUTO: 0.07 10*3/MM3 (ref 0–0.2)
BASOPHILS NFR BLD AUTO: 0.5 % (ref 0–1.5)
BILIRUB SERPL-MCNC: 0.2 MG/DL (ref 0–1.2)
BILIRUB UR QL STRIP: NEGATIVE
BUN SERPL-MCNC: 16 MG/DL (ref 8–23)
BUN/CREAT SERPL: 24.6 (ref 7–25)
CALCIUM SPEC-SCNC: 9.9 MG/DL (ref 8.6–10.5)
CHLORIDE SERPL-SCNC: 94 MMOL/L (ref 98–107)
CLARITY UR: ABNORMAL
CO2 SERPL-SCNC: 22 MMOL/L (ref 22–29)
COLOR UR: YELLOW
CREAT SERPL-MCNC: 0.65 MG/DL (ref 0.57–1)
DEPRECATED RDW RBC AUTO: 37 FL (ref 37–54)
EGFRCR SERPLBLD CKD-EPI 2021: 86.9 ML/MIN/1.73
EOSINOPHIL # BLD AUTO: 0.05 10*3/MM3 (ref 0–0.4)
EOSINOPHIL NFR BLD AUTO: 0.4 % (ref 0.3–6.2)
ERYTHROCYTE [DISTWIDTH] IN BLOOD BY AUTOMATED COUNT: 12.8 % (ref 12.3–15.4)
GLOBULIN UR ELPH-MCNC: 2.6 GM/DL
GLUCOSE BLDC GLUCOMTR-MCNC: 202 MG/DL (ref 70–130)
GLUCOSE SERPL-MCNC: 199 MG/DL (ref 65–99)
GLUCOSE UR STRIP-MCNC: ABNORMAL MG/DL
HCT VFR BLD AUTO: 36.9 % (ref 34–46.6)
HGB BLD-MCNC: 12.3 G/DL (ref 12–15.9)
HGB UR QL STRIP.AUTO: NEGATIVE
HOLD SPECIMEN: NORMAL
HYALINE CASTS UR QL AUTO: ABNORMAL /LPF
IMM GRANULOCYTES # BLD AUTO: 0.11 10*3/MM3 (ref 0–0.05)
IMM GRANULOCYTES NFR BLD AUTO: 0.8 % (ref 0–0.5)
INR PPP: 0.9 (ref 0.8–1.2)
KETONES UR QL STRIP: ABNORMAL
LEUKOCYTE ESTERASE UR QL STRIP.AUTO: NEGATIVE
LYMPHOCYTES # BLD AUTO: 1.43 10*3/MM3 (ref 0.7–3.1)
LYMPHOCYTES NFR BLD AUTO: 10.1 % (ref 19.6–45.3)
MCH RBC QN AUTO: 26.8 PG (ref 26.6–33)
MCHC RBC AUTO-ENTMCNC: 33.3 G/DL (ref 31.5–35.7)
MCV RBC AUTO: 80.4 FL (ref 79–97)
MONOCYTES # BLD AUTO: 0.87 10*3/MM3 (ref 0.1–0.9)
MONOCYTES NFR BLD AUTO: 6.1 % (ref 5–12)
NEUTROPHILS NFR BLD AUTO: 11.66 10*3/MM3 (ref 1.7–7)
NEUTROPHILS NFR BLD AUTO: 82.1 % (ref 42.7–76)
NITRITE UR QL STRIP: NEGATIVE
NRBC BLD AUTO-RTO: 0 /100 WBC (ref 0–0.2)
PH UR STRIP.AUTO: 8 [PH] (ref 5–9)
PLATELET # BLD AUTO: 253 10*3/MM3 (ref 140–450)
PMV BLD AUTO: 10.4 FL (ref 6–12)
POTASSIUM SERPL-SCNC: 3.5 MMOL/L (ref 3.5–5.2)
PROT SERPL-MCNC: 6.9 G/DL (ref 6–8.5)
PROT UR QL STRIP: ABNORMAL
PROTHROMBIN TIME: 12.2 SECONDS (ref 11.1–15.3)
RBC # BLD AUTO: 4.59 10*6/MM3 (ref 3.77–5.28)
RBC # UR STRIP: ABNORMAL /HPF
REF LAB TEST METHOD: ABNORMAL
SODIUM SERPL-SCNC: 134 MMOL/L (ref 136–145)
SP GR UR STRIP: 1.01 (ref 1–1.03)
SQUAMOUS #/AREA URNS HPF: ABNORMAL /HPF
UROBILINOGEN UR QL STRIP: ABNORMAL
WBC # UR STRIP: ABNORMAL /HPF
WBC NRBC COR # BLD: 14.19 10*3/MM3 (ref 3.4–10.8)

## 2023-05-29 PROCEDURE — 25010000002 HYDRALAZINE PER 20 MG: Performed by: PHYSICIAN ASSISTANT

## 2023-05-29 PROCEDURE — 73502 X-RAY EXAM HIP UNI 2-3 VIEWS: CPT

## 2023-05-29 PROCEDURE — 99285 EMERGENCY DEPT VISIT HI MDM: CPT

## 2023-05-29 PROCEDURE — 93005 ELECTROCARDIOGRAM TRACING: CPT | Performed by: PHYSICIAN ASSISTANT

## 2023-05-29 PROCEDURE — 82948 REAGENT STRIP/BLOOD GLUCOSE: CPT

## 2023-05-29 PROCEDURE — 25010000002 ONDANSETRON PER 1 MG: Performed by: EMERGENCY MEDICINE

## 2023-05-29 PROCEDURE — 85610 PROTHROMBIN TIME: CPT | Performed by: EMERGENCY MEDICINE

## 2023-05-29 PROCEDURE — G0378 HOSPITAL OBSERVATION PER HR: HCPCS

## 2023-05-29 PROCEDURE — 81001 URINALYSIS AUTO W/SCOPE: CPT | Performed by: EMERGENCY MEDICINE

## 2023-05-29 PROCEDURE — 85025 COMPLETE CBC W/AUTO DIFF WBC: CPT | Performed by: EMERGENCY MEDICINE

## 2023-05-29 PROCEDURE — 80053 COMPREHEN METABOLIC PANEL: CPT | Performed by: EMERGENCY MEDICINE

## 2023-05-29 PROCEDURE — 85730 THROMBOPLASTIN TIME PARTIAL: CPT | Performed by: EMERGENCY MEDICINE

## 2023-05-29 PROCEDURE — 25010000002 MORPHINE PER 10 MG: Performed by: EMERGENCY MEDICINE

## 2023-05-29 RX ORDER — HYDRALAZINE HYDROCHLORIDE 20 MG/ML
10 INJECTION INTRAMUSCULAR; INTRAVENOUS EVERY 6 HOURS PRN
Status: DISCONTINUED | OUTPATIENT
Start: 2023-05-29 | End: 2023-06-02 | Stop reason: HOSPADM

## 2023-05-29 RX ORDER — ONDANSETRON 2 MG/ML
4 INJECTION INTRAMUSCULAR; INTRAVENOUS EVERY 6 HOURS PRN
Status: DISCONTINUED | OUTPATIENT
Start: 2023-05-29 | End: 2023-06-02 | Stop reason: HOSPADM

## 2023-05-29 RX ORDER — DEXTROSE MONOHYDRATE 25 G/50ML
25 INJECTION, SOLUTION INTRAVENOUS
Status: DISCONTINUED | OUTPATIENT
Start: 2023-05-29 | End: 2023-06-02 | Stop reason: HOSPADM

## 2023-05-29 RX ORDER — SODIUM CHLORIDE 0.9 % (FLUSH) 0.9 %
10 SYRINGE (ML) INJECTION AS NEEDED
Status: DISCONTINUED | OUTPATIENT
Start: 2023-05-29 | End: 2023-06-02 | Stop reason: HOSPADM

## 2023-05-29 RX ORDER — BISACODYL 10 MG
10 SUPPOSITORY, RECTAL RECTAL DAILY PRN
Status: DISCONTINUED | OUTPATIENT
Start: 2023-05-29 | End: 2023-06-02 | Stop reason: HOSPADM

## 2023-05-29 RX ORDER — SODIUM CHLORIDE 0.9 % (FLUSH) 0.9 %
10 SYRINGE (ML) INJECTION EVERY 12 HOURS SCHEDULED
Status: DISCONTINUED | OUTPATIENT
Start: 2023-05-29 | End: 2023-06-02 | Stop reason: HOSPADM

## 2023-05-29 RX ORDER — INSULIN ASPART 100 [IU]/ML
0-9 INJECTION, SOLUTION INTRAVENOUS; SUBCUTANEOUS
Status: DISCONTINUED | OUTPATIENT
Start: 2023-05-30 | End: 2023-06-02 | Stop reason: HOSPADM

## 2023-05-29 RX ORDER — ONDANSETRON 4 MG/1
4 TABLET, FILM COATED ORAL EVERY 6 HOURS PRN
Status: DISCONTINUED | OUTPATIENT
Start: 2023-05-29 | End: 2023-06-02 | Stop reason: HOSPADM

## 2023-05-29 RX ORDER — BISACODYL 5 MG/1
5 TABLET, DELAYED RELEASE ORAL DAILY PRN
Status: DISCONTINUED | OUTPATIENT
Start: 2023-05-29 | End: 2023-06-02 | Stop reason: HOSPADM

## 2023-05-29 RX ORDER — POLYETHYLENE GLYCOL 3350 17 G/17G
17 POWDER, FOR SOLUTION ORAL DAILY PRN
Status: DISCONTINUED | OUTPATIENT
Start: 2023-05-29 | End: 2023-06-02 | Stop reason: HOSPADM

## 2023-05-29 RX ORDER — SODIUM CHLORIDE 0.9 % (FLUSH) 0.9 %
10 SYRINGE (ML) INJECTION AS NEEDED
Status: DISCONTINUED | OUTPATIENT
Start: 2023-05-29 | End: 2023-05-31

## 2023-05-29 RX ORDER — NICOTINE POLACRILEX 4 MG
15 LOZENGE BUCCAL
Status: DISCONTINUED | OUTPATIENT
Start: 2023-05-29 | End: 2023-06-02 | Stop reason: HOSPADM

## 2023-05-29 RX ORDER — ONDANSETRON 2 MG/ML
4 INJECTION INTRAMUSCULAR; INTRAVENOUS ONCE
Status: COMPLETED | OUTPATIENT
Start: 2023-05-29 | End: 2023-05-29

## 2023-05-29 RX ORDER — FERROUS SULFATE TAB EC 324 MG (65 MG FE EQUIVALENT) 324 (65 FE) MG
324 TABLET DELAYED RESPONSE ORAL
Status: DISCONTINUED | OUTPATIENT
Start: 2023-05-30 | End: 2023-06-02 | Stop reason: HOSPADM

## 2023-05-29 RX ORDER — MORPHINE SULFATE 2 MG/ML
1 INJECTION, SOLUTION INTRAMUSCULAR; INTRAVENOUS EVERY 4 HOURS PRN
Status: DISCONTINUED | OUTPATIENT
Start: 2023-05-29 | End: 2023-06-02 | Stop reason: HOSPADM

## 2023-05-29 RX ORDER — ATORVASTATIN CALCIUM 20 MG/1
20 TABLET, FILM COATED ORAL DAILY
Status: DISCONTINUED | OUTPATIENT
Start: 2023-05-30 | End: 2023-06-02 | Stop reason: HOSPADM

## 2023-05-29 RX ORDER — LISINOPRIL 2.5 MG/1
2.5 TABLET ORAL DAILY
Status: DISCONTINUED | OUTPATIENT
Start: 2023-05-30 | End: 2023-06-02 | Stop reason: HOSPADM

## 2023-05-29 RX ORDER — HYDROCHLOROTHIAZIDE 12.5 MG/1
12.5 TABLET ORAL DAILY
Status: DISCONTINUED | OUTPATIENT
Start: 2023-05-30 | End: 2023-06-02 | Stop reason: HOSPADM

## 2023-05-29 RX ORDER — AMOXICILLIN 250 MG
2 CAPSULE ORAL 2 TIMES DAILY
Status: DISCONTINUED | OUTPATIENT
Start: 2023-05-29 | End: 2023-06-02 | Stop reason: HOSPADM

## 2023-05-29 RX ORDER — PANTOPRAZOLE SODIUM 40 MG/1
40 TABLET, DELAYED RELEASE ORAL
Status: DISCONTINUED | OUTPATIENT
Start: 2023-05-30 | End: 2023-06-02 | Stop reason: HOSPADM

## 2023-05-29 RX ORDER — NALOXONE HCL 0.4 MG/ML
0.4 VIAL (ML) INJECTION
Status: DISCONTINUED | OUTPATIENT
Start: 2023-05-29 | End: 2023-06-02 | Stop reason: HOSPADM

## 2023-05-29 RX ORDER — SODIUM CHLORIDE 9 MG/ML
40 INJECTION, SOLUTION INTRAVENOUS AS NEEDED
Status: DISCONTINUED | OUTPATIENT
Start: 2023-05-29 | End: 2023-06-02 | Stop reason: HOSPADM

## 2023-05-29 RX ORDER — ASPIRIN 81 MG/1
81 TABLET, CHEWABLE ORAL DAILY
Status: DISCONTINUED | OUTPATIENT
Start: 2023-05-30 | End: 2023-06-02 | Stop reason: HOSPADM

## 2023-05-29 RX ORDER — ATENOLOL 25 MG/1
12.5 TABLET ORAL DAILY
Status: DISCONTINUED | OUTPATIENT
Start: 2023-05-30 | End: 2023-06-02 | Stop reason: HOSPADM

## 2023-05-29 RX ADMIN — MORPHINE SULFATE 4 MG: 4 INJECTION, SOLUTION INTRAMUSCULAR; INTRAVENOUS at 18:25

## 2023-05-29 RX ADMIN — DOCUSATE SODIUM 50 MG AND SENNOSIDES 8.6 MG 2 TABLET: 8.6; 5 TABLET, FILM COATED ORAL at 21:30

## 2023-05-29 RX ADMIN — HYDRALAZINE HYDROCHLORIDE 10 MG: 20 INJECTION INTRAMUSCULAR; INTRAVENOUS at 18:54

## 2023-05-29 RX ADMIN — ONDANSETRON 4 MG: 2 INJECTION INTRAMUSCULAR; INTRAVENOUS at 18:25

## 2023-05-29 RX ADMIN — Medication 10 ML: at 21:30

## 2023-05-29 NOTE — H&P
"    [facility] Medicine  HISTORY AND PHYSICAL      Date of Admission: 5/29/2023  Primary Care Physician: Troy Cheng MD    Subjective     Chief Complaint: Fall, left hip pain    History of Present Illness  Patient is an 83 year old female (PMHx DM, HTN, HLD, Osteoporosis) who presented to [facility] ED after fall. She tells me she went to sit down and \"sat too hard\", falling into the floor. She complains of left hip pain. She denies hitting her head or losing consciousness. She does not take any blood thinners. Patient reports history of hip fracture in the past, but is unsure which hip (xray imaging does not show any hardware in either hip).     ED findings demonstrates left intertrochanteric fracture today. Mild leukocytosis noted at 14,000. CMP stable. UA is cloudy with trace bacteria, but patient denies urinary symptoms.       Review of Systems   Constitutional: Negative for appetite change, chills, diaphoresis, fatigue and fever.   HENT: Negative for congestion, ear pain, postnasal drip, rhinorrhea, sinus pressure, sinus pain, sneezing, sore throat and trouble swallowing.    Eyes: Negative for photophobia, pain and discharge.   Respiratory: Negative for cough, chest tightness, shortness of breath and wheezing.    Cardiovascular: Negative for chest pain, palpitations and leg swelling.   Gastrointestinal: Negative for abdominal pain, blood in stool, constipation, diarrhea, nausea and vomiting.   Endocrine: Negative for polydipsia, polyphagia and polyuria.   Genitourinary: Negative for difficulty urinating, dysuria, flank pain, frequency, hematuria and urgency.   Musculoskeletal: Positive for arthralgias (left hip pain). Negative for back pain, myalgias and neck pain.   Skin: Negative for color change, rash and wound.   Neurological: Negative for dizziness, seizures, syncope, weakness and headaches.   Hematological: Does not bruise/bleed easily. " "  Psychiatric/Behavioral: Negative for behavioral problems, confusion, hallucinations, sleep disturbance and suicidal ideas.        Otherwise complete ROS reviewed and negative except as mentioned in the HPI.    Past Medical History:   Past Medical History:   Diagnosis Date   • Cataract    • Diabetes mellitus    • Hyperlipidemia    • Hypertension    • Irregular heart beat    • Osteoporosis    • Skin cancer    • Wears dentures     upper and lower     Past Surgical History:  Past Surgical History:   Procedure Laterality Date   • CATARACT EXTRACTION W/ INTRAOCULAR LENS IMPLANT Left 9/18/2020    Procedure: REMOVE CATARACT AND IMPLANT  INTRAOCULAR LENS;  Surgeon: Juan Tineo MD;  Location: [facility];  Service: Ophthalmology;  Laterality: Left;   • CATARACT EXTRACTION W/ INTRAOCULAR LENS IMPLANT Right 10/2/2020    Procedure: REMOVE CATARACT AND IMPLANT INTRAOCULAR LENS;  Surgeon: Juan Tineo MD;  Location: [facility];  Service: Ophthalmology;  Laterality: Right;   • CHOLECYSTECTOMY     • ORIF ANKLE FRACTURE Left      Social History:  reports that she quit smoking about 10 years ago. She has never used smokeless tobacco. She reports that she does not drink alcohol and does not use drugs.    Family History: family history is not on file.       Allergies:  Allergies   Allergen Reactions   • Alendronate Unknown (See Comments)     \"gives me chest pain\"   • Latex Other (See Comments)     irritate skin   • Niacin And Related Hives   • Other Hives     Allergic to \"all mycins\"       Medications:  Prior to Admission medications    Medication Sig Start Date End Date Taking? Authorizing Provider   aspirin 81 MG chewable tablet Chew 81 mg Daily.    Deborah Lind MD   atenolol (TENORMIN) 25 MG tablet Take 12.5 mg by mouth Daily. Take one-half tablet once daily    Deborah Lind MD   Calcium 500-100 MG-UNIT chewable tablet Chew 1 tablet 2 (Two) Times a Day. Plus d    Deborah Lind MD " "  ferrous sulfate 324 (65 Fe) MG tablet delayed-release EC tablet Take 324 mg by mouth Daily With Breakfast.    Deborah Lind MD   glipiZIDE (GLUCOTROL) 5 MG tablet Take 5 mg by mouth 2 (Two) Times a Day Before Meals.    Deborah Lind MD   hydrochlorothiazide (HYDRODIURIL) 12.5 MG tablet Take 12.5 mg by mouth Daily.    Deborah Lind MD   lisinopril (PRINIVIL,ZESTRIL) 2.5 MG tablet Take 2.5 mg by mouth Daily.    Deborah Lind MD   metFORMIN (GLUCOPHAGE) 1000 MG tablet Take 1,000 mg by mouth 2 (Two) Times a Day With Meals.    Deborah Lind MD   omeprazole (priLOSEC) 20 MG capsule Take 20 mg by mouth Daily.    Deborah Lind MD   sennosides-docusate sodium (SENOKOT-S) 8.6-50 MG tablet Take 1 tablet by mouth Daily As Needed for Constipation. 12/19/17   Puneet Haider APRN   simvastatin (ZOCOR) 40 MG tablet Take 40 mg by mouth Every Night.    Deborah Lind MD   vitamin B-12 (CYANOCOBALAMIN) 100 MCG tablet Take 50 mcg by mouth Daily.    Deborah Lind MD     I have utilized all available immediate resources to obtain, update, and review the patient's current medications.    Objective     Vital Signs: BP (!) 224/102   Pulse 88   Temp 97.7 °F (36.5 °C) (Oral)   Resp 18   Ht 154.9 cm (61\")   Wt 65.3 kg (144 lb)   SpO2 97%   BMI 27.21 kg/m²   Physical Exam  Vitals and nursing note reviewed.   Constitutional:       Appearance: Normal appearance.   HENT:      Head: Normocephalic and atraumatic.      Right Ear: External ear normal.      Left Ear: External ear normal.      Nose: Nose normal. No congestion or rhinorrhea.      Mouth/Throat:      Mouth: Mucous membranes are moist.      Pharynx: Oropharynx is clear.   Eyes:      General: No scleral icterus.     Extraocular Movements: Extraocular movements intact.      Conjunctiva/sclera: Conjunctivae normal.      Pupils: Pupils are equal, round, and reactive to light.   Neck:      Vascular: No carotid " bruit.   Cardiovascular:      Rate and Rhythm: Normal rate and regular rhythm.      Pulses: Normal pulses.      Heart sounds: Normal heart sounds. No murmur heard.  Pulmonary:      Effort: Pulmonary effort is normal.      Breath sounds: Normal breath sounds. No wheezing, rhonchi or rales.   Abdominal:      General: Abdomen is flat. Bowel sounds are normal.      Palpations: Abdomen is soft.      Tenderness: There is no abdominal tenderness. There is no guarding.   Musculoskeletal:         General: No swelling or deformity. Normal range of motion.      Cervical back: Normal range of motion and neck supple. No tenderness.      Left hip: Tenderness present.      Right lower leg: No edema.      Left lower leg: No edema.      Comments: Pedal pulses and sensation intact   Skin:     General: Skin is warm and dry.      Capillary Refill: Capillary refill takes less than 2 seconds.      Findings: No rash.   Neurological:      General: No focal deficit present.      Mental Status: She is alert and oriented to person, place, and time.      Motor: No weakness.   Psychiatric:         Mood and Affect: Mood normal.         Behavior: Behavior normal.         Thought Content: Thought content normal.         Judgment: Judgment normal.              Results Reviewed:  Lab Results (last 24 hours)     Procedure Component Value Units Date/Time    Urinalysis, Microscopic Only - Urine, Clean Catch [[id number]]  (Abnormal) Collected: 05/29/23 1819    Specimen: Urine, Clean Catch Updated: 05/29/23 1830     RBC, UA 0-2 /HPF      WBC, UA 0-2 /HPF      Bacteria, UA Trace /HPF      Squamous Epithelial Cells, UA 6-12 /HPF      Hyaline Casts, UA 0-2 /LPF      Methodology Automated Microscopy    Urinalysis With Microscopic If Indicated (No Culture) - Urine, Clean Catch [[id number]]  (Abnormal) Collected: 05/29/23 1819    Specimen: Urine, Clean Catch Updated: 05/29/23 1830     Color, UA Yellow     Appearance, UA Cloudy     pH, UA 8.0     Specific  Gravity, UA 1.015     Glucose,  mg/dL (Trace)     Ketones, UA 15 mg/dL (1+)     Bilirubin, UA Negative     Blood, UA Negative     Protein, UA 30 mg/dL (1+)     Leuk Esterase, UA Negative     Nitrite, UA Negative     Urobilinogen, UA 0.2 E.U./dL    CBC & Differential [[id number]]  (Abnormal) Collected: 05/29/23 1818    Specimen: Blood Updated: 05/29/23 1826    Narrative:      The following orders were created for panel order CBC & Differential.  Procedure                               Abnormality         Status                     ---------                               -----------         ------                     CBC Auto Differential[[id number]]        Abnormal            Final result                 Please view results for these tests on the individual orders.    CBC Auto Differential [[id number]]  (Abnormal) Collected: 05/29/23 1818    Specimen: Blood Updated: 05/29/23 1826     WBC 14.19 10*3/mm3      RBC 4.59 10*6/mm3      Hemoglobin 12.3 g/dL      Hematocrit 36.9 %      MCV 80.4 fL      MCH 26.8 pg      MCHC 33.3 g/dL      RDW 12.8 %      RDW-SD 37.0 fl      MPV 10.4 fL      Platelets 253 10*3/mm3      Neutrophil % 82.1 %      Lymphocyte % 10.1 %      Monocyte % 6.1 %      Eosinophil % 0.4 %      Basophil % 0.5 %      Immature Grans % 0.8 %      Neutrophils, Absolute 11.66 10*3/mm3      Lymphocytes, Absolute 1.43 10*3/mm3      Monocytes, Absolute 0.87 10*3/mm3      Eosinophils, Absolute 0.05 10*3/mm3      Basophils, Absolute 0.07 10*3/mm3      Immature Grans, Absolute 0.11 10*3/mm3      nRBC 0.0 /100 WBC     Extra Tubes [[id number]] Collected: 05/29/23 1823    Specimen: Blood, Venous Line Updated: 05/29/23 1823    Narrative:      The following orders were created for panel order Extra Tubes.  Procedure                               Abnormality         Status                     ---------                               -----------         ------                     Gold Top - [facility][[id number]]                     "               In process                   Please view results for these tests on the individual orders.    Gold Top - SST [[id number]] Collected: 05/29/23 1823    Specimen: Blood Updated: 05/29/23 1823    Comprehensive Metabolic Panel [[id number]] Collected: 05/29/23 1818    Specimen: Blood Updated: 05/29/23 1823    Protime-INR [[id number]] Collected: 05/29/23 1818    Specimen: Blood Updated: 05/29/23 1823    aPTT [[id number]] Collected: 05/29/23 1818    Specimen: Blood Updated: 05/29/23 1823        Imaging Results (Last 24 Hours)     Procedure Component Value Units Date/Time    XR Hip With or Without Pelvis 2 - 3 View Left [[id number]] Collected: 05/29/23 1803     Updated: 05/29/23 1809    Narrative:      HISTORY:  Fall, hip pain.    COMPARISON:  None.    FINDINGS:  AP view of the pelvis and lateral view of the left hip demonstrate left  intertrochanteric fracture.  There is angulation.  There is no dislocation.      Impression:      Acute left intertrochanteric fracture.        I have personally reviewed and interpreted the radiology studies and ECG obtained at time of admission.       Assessment / Plan   Treatment Plan:  1. Left Hip fracture   -Orthopedics consulting; planning for OR tomorrow   -Pain and nausea control   -PT and OT after surgery; lives alone     2. DM   -Consistent carbohydrate diet, Acchecks with sliding scale insulin    3. HTN   -Continue home meds; PRN hydralazine       VTE PPx: SCDs      Medical Decision Making  Number and Complexity of problems: 1 high, 2 moderate    Conditions and Status:        Condition is unchanged.    [facility] Data  External documents reviewed: The patient's recent past medical charts for this facility as well as outside facilities via the \"Care Everywhere\" application of Epic reviewed.     Discussed with: admitting provider, ED provider     I have utilized all available immediate resources to obtain, update, or review the patient's current medications (including all " prescriptions, over-the-counter products, herbals, cannabis/cannabidiol products, and vitamin/mineral/dietary (nutritional) supplements).     Care Planning  Shared decision making: Patient updated on current status and informed of proposed care plan; is in agreement with plan  Code status and discussions: Full code  I confirmed that the patient's Advance Care Plan is present, code status is documented, or surrogate decision maker is listed in the patient's medical record.       Disposition  Social Determinants of Health that impact treatment or disposition: n/a  I expect the patient to be discharged to home w/ HHPT vs ARU vs skilled rehab in 2-3 days.     The patient was seen and examined by me on 05/29/23.        Electronically signed by Ina Man PA-C, 05/29/23, 18:31 CDT.

## 2023-05-29 NOTE — ED PROVIDER NOTES
"Subjective   History of Present Illness  83-year-old female presents to the emergency department with complaint of hip pain on the left side after an accidental fall.  She reports she has walked but it is uncomfortable.  Denies hitting her head or loss of consciousness.  Denies any other injury.    Family history, surgical history, social history, current medications and allergies are reviewed with the patient and triage documentation and vitals are reviewed.    History provided by:  Patient   used: No        Review of Systems   Constitutional: Negative for chills and fever.   HENT: Negative for congestion and sore throat.    Eyes: Negative for photophobia and visual disturbance.   Respiratory: Negative for cough, shortness of breath and wheezing.    Cardiovascular: Negative for chest pain, palpitations and leg swelling.   Gastrointestinal: Negative for abdominal pain, diarrhea, nausea and vomiting.   Endocrine: Negative for polydipsia, polyphagia and polyuria.   Genitourinary: Negative for dysuria, frequency and urgency.   Musculoskeletal: Positive for arthralgias. Negative for back pain, myalgias and neck pain.   Skin: Negative for wound.   Allergic/Immunologic: Negative.    Neurological: Negative for weakness, light-headedness and numbness.   Hematological: Negative.    Psychiatric/Behavioral: Negative.        Past Medical History:   Diagnosis Date   • Cataract    • Diabetes mellitus    • Hyperlipidemia    • Hypertension    • Irregular heart beat    • Osteoporosis    • Skin cancer    • Wears dentures     upper and lower       Allergies   Allergen Reactions   • Alendronate Unknown (See Comments)     \"gives me chest pain\"   • Latex Other (See Comments)     irritate skin   • Niacin And Related Hives   • Other Hives     Allergic to \"all mycins\"       Past Surgical History:   Procedure Laterality Date   • CATARACT EXTRACTION W/ INTRAOCULAR LENS IMPLANT Left 9/18/2020    Procedure: REMOVE CATARACT " AND IMPLANT  INTRAOCULAR LENS;  Surgeon: Juan Tineo MD;  Location: [facility];  Service: Ophthalmology;  Laterality: Left;   • CATARACT EXTRACTION W/ INTRAOCULAR LENS IMPLANT Right 10/2/2020    Procedure: REMOVE CATARACT AND IMPLANT INTRAOCULAR LENS;  Surgeon: Juan Tineo MD;  Location: [facility];  Service: Ophthalmology;  Laterality: Right;   • CHOLECYSTECTOMY     • ORIF ANKLE FRACTURE Left        No family history on file.    Social History     Socioeconomic History   • Marital status:    Tobacco Use   • Smoking status: Former     Types: Cigarettes     Quit date: 2013     Years since quitting: 10.4   • Smokeless tobacco: Never   Vaping Use   • Vaping Use: Never used   Substance and Sexual Activity   • Alcohol use: No   • Drug use: No   • Sexual activity: Defer           Objective   Physical Exam  Vitals and nursing note reviewed.   Constitutional:       General: She is not in acute distress.     Appearance: Normal appearance. She is normal weight. She is not ill-appearing.   HENT:      Head: Normocephalic and atraumatic.      Nose: Nose normal.   Eyes:      Conjunctiva/sclera: Conjunctivae normal.      Pupils: Pupils are equal, round, and reactive to light.   Cardiovascular:      Rate and Rhythm: Normal rate and regular rhythm.      Pulses: Normal pulses.   Pulmonary:      Effort: Pulmonary effort is normal.      Breath sounds: Normal breath sounds.   Abdominal:      General: Bowel sounds are normal.      Palpations: Abdomen is soft.   Musculoskeletal:      Cervical back: Normal range of motion and neck supple. No tenderness.      Left hip: Tenderness present. No deformity or crepitus. Decreased range of motion.   Skin:     General: Skin is warm and dry.      Capillary Refill: Capillary refill takes less than 2 seconds.   Neurological:      General: No focal deficit present.      Mental Status: She is alert and oriented to person, place, and time.   Psychiatric:         Mood and  Affect: Mood normal.         Behavior: Behavior normal.         Procedures  none         ED Course      Labs Reviewed   COMPREHENSIVE METABOLIC PANEL   PROTIME-INR   APTT   CBC WITH AUTO DIFFERENTIAL   URINALYSIS W/ MICROSCOPIC IF INDICATED (NO CULTURE)   CBC AND DIFFERENTIAL    Narrative:     The following orders were created for panel order CBC & Differential.  Procedure                               Abnormality         Status                     ---------                               -----------         ------                     CBC Auto Differential[[id number]]                            In process                   Please view results for these tests on the individual orders.   EXTRA TUBES    Narrative:     The following orders were created for panel order Extra Tubes.  Procedure                               Abnormality         Status                     ---------                               -----------         ------                     Gold Top - [facility][[id number]]                                   In process                   Please view results for these tests on the individual orders.   GOLD TOP - [facility]     XR Hip With or Without Pelvis 2 - 3 View Left    Result Date: 5/29/2023  Narrative: HISTORY: Fall, hip pain. COMPARISON: None. FINDINGS: AP view of the pelvis and lateral view of the left hip demonstrate left intertrochanteric fracture.  There is angulation.  There is no dislocation.     Impression: Acute left intertrochanteric fracture.      Medical Decision Making  Amount and/or Complexity of Data Reviewed  Labs: ordered.  Radiology: ordered.      Risk  Prescription drug management.  Decision regarding hospitalization.      X-ray imaging reveals evidence of a left intertrochanteric hip fracture.  Spoke with Dr. Campoverde on-call for Ortho.  Patient's last oral intake was around 5 PM.  Patient will be admitted to the hospitalist service for planned intervention tomorrow.  She reports minimal pain.  She  is hypertensive.  Reports she has not taken her blood pressure medication today.  Patient acknowledges understanding of need for admission and agreeable. No other injury noted. Hospitalist is agreeable to plan.     Final diagnoses:   Closed fracture of left hip, initial encounter       ED Disposition  ED Disposition     ED Disposition   Decision to Admit    Condition   --    Comment   Level of Care: Med/Surg [1]   Diagnosis: Hip fracture [616101]   Admitting Physician: LIZ JIMENEZ [[id number]]               No follow-up provider specified.       Medication List      No changes were made to your prescriptions during this visit.          Stephane Jimenez, DO  05/29/23 [id number]

## 2023-05-30 ENCOUNTER — ANESTHESIA (OUTPATIENT)
Dept: PERIOP | Facility: HOSPITAL | Age: 83
DRG: 482 | End: 2023-05-30
Payer: MEDICARE

## 2023-05-30 ENCOUNTER — APPOINTMENT (OUTPATIENT)
Dept: GENERAL RADIOLOGY | Facility: HOSPITAL | Age: 83
DRG: 482 | End: 2023-05-30
Payer: MEDICARE

## 2023-05-30 ENCOUNTER — ANESTHESIA EVENT (OUTPATIENT)
Dept: PERIOP | Facility: HOSPITAL | Age: 83
DRG: 482 | End: 2023-05-30
Payer: MEDICARE
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
## 2023-05-30 PROBLEM — S72.002A CLOSED FRACTURE OF LEFT HIP, INITIAL ENCOUNTER: Status: ACTIVE | Noted: 2023-05-30
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
## 2023-05-30 PROBLEM — E11.9 TYPE 2 DIABETES MELLITUS: Status: RESOLVED | Noted: 2017-12-14 | Resolved: 2023-05-30

## 2023-05-30 PROBLEM — S72.142A: Status: ACTIVE | Noted: 2023-05-29

## 2023-05-30 LAB
ANION GAP SERPL CALCULATED.3IONS-SCNC: 12 MMOL/L (ref 5–15)
BASOPHILS # BLD AUTO: 0.03 10*3/MM3 (ref 0–0.2)
BASOPHILS NFR BLD AUTO: 0.3 % (ref 0–1.5)
BUN SERPL-MCNC: 14 MG/DL (ref 8–23)
BUN/CREAT SERPL: 23.7 (ref 7–25)
CALCIUM SPEC-SCNC: 9.4 MG/DL (ref 8.6–10.5)
CHLORIDE SERPL-SCNC: 94 MMOL/L (ref 98–107)
CO2 SERPL-SCNC: 25 MMOL/L (ref 22–29)
CREAT SERPL-MCNC: 0.59 MG/DL (ref 0.57–1)
DEPRECATED RDW RBC AUTO: 36.5 FL (ref 37–54)
EGFRCR SERPLBLD CKD-EPI 2021: 89 ML/MIN/1.73
EOSINOPHIL # BLD AUTO: 0.02 10*3/MM3 (ref 0–0.4)
EOSINOPHIL NFR BLD AUTO: 0.2 % (ref 0.3–6.2)
ERYTHROCYTE [DISTWIDTH] IN BLOOD BY AUTOMATED COUNT: 12.8 % (ref 12.3–15.4)
GLUCOSE BLDC GLUCOMTR-MCNC: 183 MG/DL (ref 70–130)
GLUCOSE BLDC GLUCOMTR-MCNC: 186 MG/DL (ref 70–130)
GLUCOSE BLDC GLUCOMTR-MCNC: 217 MG/DL (ref 70–130)
GLUCOSE BLDC GLUCOMTR-MCNC: 276 MG/DL (ref 70–130)
GLUCOSE BLDC GLUCOMTR-MCNC: 327 MG/DL (ref 70–130)
GLUCOSE SERPL-MCNC: 154 MG/DL (ref 65–99)
HCT VFR BLD AUTO: 33.1 % (ref 34–46.6)
HCT VFR BLD AUTO: 33.6 % (ref 34–46.6)
HGB BLD-MCNC: 11.2 G/DL (ref 12–15.9)
HGB BLD-MCNC: 11.3 G/DL (ref 12–15.9)
IMM GRANULOCYTES # BLD AUTO: 0.05 10*3/MM3 (ref 0–0.05)
IMM GRANULOCYTES NFR BLD AUTO: 0.5 % (ref 0–0.5)
LYMPHOCYTES # BLD AUTO: 1.68 10*3/MM3 (ref 0.7–3.1)
LYMPHOCYTES NFR BLD AUTO: 18.1 % (ref 19.6–45.3)
MCH RBC QN AUTO: 26.6 PG (ref 26.6–33)
MCHC RBC AUTO-ENTMCNC: 33.6 G/DL (ref 31.5–35.7)
MCV RBC AUTO: 79.1 FL (ref 79–97)
MONOCYTES # BLD AUTO: 1 10*3/MM3 (ref 0.1–0.9)
MONOCYTES NFR BLD AUTO: 10.8 % (ref 5–12)
NEUTROPHILS NFR BLD AUTO: 6.52 10*3/MM3 (ref 1.7–7)
NEUTROPHILS NFR BLD AUTO: 70.1 % (ref 42.7–76)
NRBC BLD AUTO-RTO: 0 /100 WBC (ref 0–0.2)
PLATELET # BLD AUTO: 242 10*3/MM3 (ref 140–450)
PMV BLD AUTO: 10.7 FL (ref 6–12)
POTASSIUM SERPL-SCNC: 3.6 MMOL/L (ref 3.5–5.2)
QT INTERVAL: 364 MS
QTC INTERVAL: 459 MS
RBC # BLD AUTO: 4.25 10*6/MM3 (ref 3.77–5.28)
SODIUM SERPL-SCNC: 131 MMOL/L (ref 136–145)
WBC NRBC COR # BLD: 9.3 10*3/MM3 (ref 3.4–10.8)

## 2023-05-30 PROCEDURE — 76000 FLUOROSCOPY <1 HR PHYS/QHP: CPT

## 2023-05-30 PROCEDURE — C1713 ANCHOR/SCREW BN/BN,TIS/BN: HCPCS | Performed by: ORTHOPAEDIC SURGERY

## 2023-05-30 PROCEDURE — 99223 1ST HOSP IP/OBS HIGH 75: CPT | Performed by: ORTHOPAEDIC SURGERY

## 2023-05-30 PROCEDURE — 80048 BASIC METABOLIC PNL TOTAL CA: CPT | Performed by: PHYSICIAN ASSISTANT

## 2023-05-30 PROCEDURE — 85014 HEMATOCRIT: CPT | Performed by: ORTHOPAEDIC SURGERY

## 2023-05-30 PROCEDURE — 27245 TREAT THIGH FRACTURE: CPT | Performed by: SPECIALIST/TECHNOLOGIST, OTHER

## 2023-05-30 PROCEDURE — 25010000002 ONDANSETRON PER 1 MG: Performed by: ORTHOPAEDIC SURGERY

## 2023-05-30 PROCEDURE — 63710000001 INSULIN ASPART PER 5 UNITS: Performed by: ORTHOPAEDIC SURGERY

## 2023-05-30 PROCEDURE — 25010000002 CEFAZOLIN PER 500 MG: Performed by: STUDENT IN AN ORGANIZED HEALTH CARE EDUCATION/TRAINING PROGRAM

## 2023-05-30 PROCEDURE — 25010000002 FENTANYL CITRATE (PF) 100 MCG/2ML SOLUTION

## 2023-05-30 PROCEDURE — 25010000002 ONDANSETRON PER 1 MG

## 2023-05-30 PROCEDURE — 25010000002 PROPOFOL 200 MG/20ML EMULSION

## 2023-05-30 PROCEDURE — 25010000002 DEXAMETHASONE PER 1 MG

## 2023-05-30 PROCEDURE — 85018 HEMOGLOBIN: CPT | Performed by: ORTHOPAEDIC SURGERY

## 2023-05-30 PROCEDURE — 25010000002 HYDROMORPHONE 1 MG/ML SOLUTION

## 2023-05-30 PROCEDURE — 25010000002 MORPHINE PER 10 MG: Performed by: HOSPITALIST

## 2023-05-30 PROCEDURE — 25010000002 CEFAZOLIN PER 500 MG: Performed by: ORTHOPAEDIC SURGERY

## 2023-05-30 PROCEDURE — 0QS736Z REPOSITION LEFT UPPER FEMUR WITH INTRAMEDULLARY INTERNAL FIXATION DEVICE, PERCUTANEOUS APPROACH: ICD-10-PCS | Performed by: ORTHOPAEDIC SURGERY

## 2023-05-30 PROCEDURE — 27245 TREAT THIGH FRACTURE: CPT | Performed by: ORTHOPAEDIC SURGERY

## 2023-05-30 PROCEDURE — 85025 COMPLETE CBC W/AUTO DIFF WBC: CPT | Performed by: PHYSICIAN ASSISTANT

## 2023-05-30 PROCEDURE — 82948 REAGENT STRIP/BLOOD GLUCOSE: CPT

## 2023-05-30 DEVICE — TROCHANTERIC NAIL KIT, TI
Type: IMPLANTABLE DEVICE | Site: HIP | Status: FUNCTIONAL
Brand: GAMMA

## 2023-05-30 DEVICE — LOCKING SCREW, FULLY THREADED: Type: IMPLANTABLE DEVICE | Site: HIP | Status: FUNCTIONAL

## 2023-05-30 DEVICE — LAG SCREW, TI
Type: IMPLANTABLE DEVICE | Site: HIP | Status: FUNCTIONAL
Brand: GAMMA

## 2023-05-30 RX ORDER — NALOXONE HCL 0.4 MG/ML
0.4 VIAL (ML) INJECTION AS NEEDED
Status: DISCONTINUED | OUTPATIENT
Start: 2023-05-30 | End: 2023-05-30 | Stop reason: HOSPADM

## 2023-05-30 RX ORDER — LIDOCAINE HYDROCHLORIDE 10 MG/ML
INJECTION, SOLUTION EPIDURAL; INFILTRATION; INTRACAUDAL; PERINEURAL AS NEEDED
Status: DISCONTINUED | OUTPATIENT
Start: 2023-05-30 | End: 2023-05-30 | Stop reason: SURG

## 2023-05-30 RX ORDER — HEPARIN SODIUM 5000 [USP'U]/ML
5000 INJECTION, SOLUTION INTRAVENOUS; SUBCUTANEOUS EVERY 8 HOURS SCHEDULED
Status: DISCONTINUED | OUTPATIENT
Start: 2023-05-31 | End: 2023-06-02

## 2023-05-30 RX ORDER — OXYCODONE HYDROCHLORIDE 5 MG/1
5 TABLET ORAL EVERY 4 HOURS PRN
Status: DISCONTINUED | OUTPATIENT
Start: 2023-05-30 | End: 2023-06-02 | Stop reason: HOSPADM

## 2023-05-30 RX ORDER — DIPHENHYDRAMINE HYDROCHLORIDE 50 MG/ML
12.5 INJECTION INTRAMUSCULAR; INTRAVENOUS
Status: DISCONTINUED | OUTPATIENT
Start: 2023-05-30 | End: 2023-05-30 | Stop reason: HOSPADM

## 2023-05-30 RX ORDER — BUPIVACAINE HCL/0.9 % NACL/PF 0.1 %
2 PLASTIC BAG, INJECTION (ML) EPIDURAL EVERY 8 HOURS
Status: COMPLETED | OUTPATIENT
Start: 2023-05-30 | End: 2023-05-31

## 2023-05-30 RX ORDER — FLUMAZENIL 0.1 MG/ML
0.2 INJECTION INTRAVENOUS AS NEEDED
Status: DISCONTINUED | OUTPATIENT
Start: 2023-05-30 | End: 2023-05-30 | Stop reason: HOSPADM

## 2023-05-30 RX ORDER — ACETAMINOPHEN 325 MG/1
650 TABLET ORAL ONCE AS NEEDED
Status: DISCONTINUED | OUTPATIENT
Start: 2023-05-30 | End: 2023-05-30 | Stop reason: HOSPADM

## 2023-05-30 RX ORDER — DEXAMETHASONE SODIUM PHOSPHATE 4 MG/ML
INJECTION, SOLUTION INTRA-ARTICULAR; INTRALESIONAL; INTRAMUSCULAR; INTRAVENOUS; SOFT TISSUE AS NEEDED
Status: DISCONTINUED | OUTPATIENT
Start: 2023-05-30 | End: 2023-05-30 | Stop reason: SURG

## 2023-05-30 RX ORDER — ONDANSETRON 2 MG/ML
4 INJECTION INTRAMUSCULAR; INTRAVENOUS ONCE AS NEEDED
Status: DISCONTINUED | OUTPATIENT
Start: 2023-05-30 | End: 2023-05-30 | Stop reason: HOSPADM

## 2023-05-30 RX ORDER — ONDANSETRON 2 MG/ML
INJECTION INTRAMUSCULAR; INTRAVENOUS AS NEEDED
Status: DISCONTINUED | OUTPATIENT
Start: 2023-05-30 | End: 2023-05-30 | Stop reason: SURG

## 2023-05-30 RX ORDER — FENTANYL CITRATE 50 UG/ML
INJECTION, SOLUTION INTRAMUSCULAR; INTRAVENOUS AS NEEDED
Status: DISCONTINUED | OUTPATIENT
Start: 2023-05-30 | End: 2023-05-30 | Stop reason: SURG

## 2023-05-30 RX ORDER — BUPIVACAINE HCL/0.9 % NACL/PF 0.1 %
2 PLASTIC BAG, INJECTION (ML) EPIDURAL ONCE
Status: COMPLETED | OUTPATIENT
Start: 2023-05-30 | End: 2023-05-30

## 2023-05-30 RX ORDER — EPHEDRINE SULFATE 50 MG/ML
5 INJECTION, SOLUTION INTRAVENOUS ONCE AS NEEDED
Status: DISCONTINUED | OUTPATIENT
Start: 2023-05-30 | End: 2023-05-30 | Stop reason: HOSPADM

## 2023-05-30 RX ORDER — SODIUM CHLORIDE 9 MG/ML
80 INJECTION, SOLUTION INTRAVENOUS CONTINUOUS
Status: DISCONTINUED | OUTPATIENT
Start: 2023-05-30 | End: 2023-06-02 | Stop reason: HOSPADM

## 2023-05-30 RX ORDER — PROPOFOL 10 MG/ML
INJECTION, EMULSION INTRAVENOUS AS NEEDED
Status: DISCONTINUED | OUTPATIENT
Start: 2023-05-30 | End: 2023-05-30 | Stop reason: SURG

## 2023-05-30 RX ADMIN — MORPHINE SULFATE 1 MG: 2 INJECTION, SOLUTION INTRAMUSCULAR; INTRAVENOUS at 09:51

## 2023-05-30 RX ADMIN — INSULIN ASPART 4 UNITS: 100 INJECTION, SOLUTION INTRAVENOUS; SUBCUTANEOUS at 16:30

## 2023-05-30 RX ADMIN — SODIUM CHLORIDE 80 ML/HR: 9 INJECTION, SOLUTION INTRAVENOUS at 08:10

## 2023-05-30 RX ADMIN — OXYCODONE HYDROCHLORIDE 5 MG: 5 TABLET ORAL at 16:29

## 2023-05-30 RX ADMIN — FENTANYL CITRATE 25 MCG: 50 INJECTION, SOLUTION INTRAMUSCULAR; INTRAVENOUS at 14:28

## 2023-05-30 RX ADMIN — CEFAZOLIN 2 G: 10 INJECTION, POWDER, FOR SOLUTION INTRAVENOUS at 21:30

## 2023-05-30 RX ADMIN — ATORVASTATIN CALCIUM 20 MG: 20 TABLET, FILM COATED ORAL at 08:10

## 2023-05-30 RX ADMIN — PANTOPRAZOLE SODIUM 40 MG: 40 TABLET, DELAYED RELEASE ORAL at 05:16

## 2023-05-30 RX ADMIN — OXYCODONE HYDROCHLORIDE 5 MG: 5 TABLET ORAL at 21:23

## 2023-05-30 RX ADMIN — HYDROMORPHONE HYDROCHLORIDE 0.5 MG: 1 INJECTION, SOLUTION INTRAMUSCULAR; INTRAVENOUS; SUBCUTANEOUS at 15:39

## 2023-05-30 RX ADMIN — MORPHINE SULFATE 1 MG: 2 INJECTION, SOLUTION INTRAMUSCULAR; INTRAVENOUS at 05:29

## 2023-05-30 RX ADMIN — FERROUS SULFATE TAB EC 324 MG (65 MG FE EQUIVALENT) 324 MG: 324 (65 FE) TABLET DELAYED RESPONSE at 08:10

## 2023-05-30 RX ADMIN — Medication 10 ML: at 20:16

## 2023-05-30 RX ADMIN — Medication 10 ML: at 08:11

## 2023-05-30 RX ADMIN — DOCUSATE SODIUM 50 MG AND SENNOSIDES 8.6 MG 2 TABLET: 8.6; 5 TABLET, FILM COATED ORAL at 16:29

## 2023-05-30 RX ADMIN — DEXAMETHASONE SODIUM PHOSPHATE 4 MG: 4 INJECTION, SOLUTION INTRAMUSCULAR; INTRAVENOUS at 14:30

## 2023-05-30 RX ADMIN — FENTANYL CITRATE 25 MCG: 50 INJECTION, SOLUTION INTRAMUSCULAR; INTRAVENOUS at 14:40

## 2023-05-30 RX ADMIN — TRANEXAMIC ACID 1000 MG: 1 INJECTION, SOLUTION INTRAVENOUS at 13:09

## 2023-05-30 RX ADMIN — LIDOCAINE HYDROCHLORIDE 50 MG: 10 INJECTION, SOLUTION EPIDURAL; INFILTRATION; INTRACAUDAL; PERINEURAL at 14:19

## 2023-05-30 RX ADMIN — ONDANSETRON 4 MG: 2 INJECTION INTRAMUSCULAR; INTRAVENOUS at 15:02

## 2023-05-30 RX ADMIN — ATENOLOL 12.5 MG: 25 TABLET ORAL at 08:10

## 2023-05-30 RX ADMIN — LISINOPRIL 2.5 MG: 2.5 TABLET ORAL at 16:30

## 2023-05-30 RX ADMIN — Medication 2 G: at 14:25

## 2023-05-30 RX ADMIN — FENTANYL CITRATE 25 MCG: 50 INJECTION, SOLUTION INTRAMUSCULAR; INTRAVENOUS at 14:29

## 2023-05-30 RX ADMIN — FENTANYL CITRATE 25 MCG: 50 INJECTION, SOLUTION INTRAMUSCULAR; INTRAVENOUS at 14:16

## 2023-05-30 RX ADMIN — ONDANSETRON 4 MG: 2 INJECTION INTRAMUSCULAR; INTRAVENOUS at 16:29

## 2023-05-30 RX ADMIN — SODIUM CHLORIDE 80 ML/HR: 9 INJECTION, SOLUTION INTRAVENOUS at 16:10

## 2023-05-30 RX ADMIN — HYDROCHLOROTHIAZIDE 12.5 MG: 12.5 TABLET ORAL at 08:10

## 2023-05-30 RX ADMIN — PROPOFOL 120 MG: 10 INJECTION, EMULSION INTRAVENOUS at 14:20

## 2023-05-30 RX ADMIN — DOCUSATE SODIUM 50 MG AND SENNOSIDES 8.6 MG 2 TABLET: 8.6; 5 TABLET, FILM COATED ORAL at 20:16

## 2023-05-30 NOTE — CONSULTS
[facility]   Orthopedic Consult Note    Patient Name: Susanna Carr  : 1940  MRN: [id number]  Primary Care Physician: Troy Cheng MD  Referring Physician: Stephane Jimenez DO  Date of admission: 2023    Orthopedics (on-call MD unless specified)  Consult performed by: Dominick Monae MD  Consult ordered by: Ina Man PA-C  Reason for consult: left hip fracture        Subjective   Subjective     Reason for Consult/ Chief Complaint: left hip fracture    History of Present Illness  Patient is an 83-year-old female who states that she was helping give her granddaughter a bath.  After the bath she was bringing her shower bench back into the bathroom to place it back onto the tub when she fell and landed on her left side.  She had immediate pain and inability to stand, inability to bear weight on the left leg.  She is brought to the emergency department where she was found to have a left intertrochanteric hip fracture.  She denies any syncopal episode preceding her fall and denies any loss of consciousness associated with her fall.  No other bony complaints.  No fevers or chills.    Review of Systems   Constitutional: Negative for chills and fever.   Respiratory: Negative.    Cardiovascular: Negative.    Gastrointestinal: Negative.    Musculoskeletal:        Left hip pain   All other systems reviewed and are negative.       Personal History     Past Medical History:   Diagnosis Date   • Cataract    • Diabetes mellitus    • Hyperlipidemia    • Hypertension    • Irregular heart beat    • Osteoporosis    • Skin cancer    • Wears dentures     upper and lower       Past Surgical History:   Procedure Laterality Date   • CATARACT EXTRACTION W/ INTRAOCULAR LENS IMPLANT Left 2020    Procedure: REMOVE CATARACT AND IMPLANT  INTRAOCULAR LENS;  Surgeon: Juan Tineo MD;  Location: [facility];  Service: Ophthalmology;  Laterality: Left;   • CATARACT EXTRACTION W/  "INTRAOCULAR LENS IMPLANT Right 10/2/2020    Procedure: REMOVE CATARACT AND IMPLANT INTRAOCULAR LENS;  Surgeon: Juan Tineo MD;  Location: [facility];  Service: Ophthalmology;  Laterality: Right;   • CHOLECYSTECTOMY     • ORIF ANKLE FRACTURE Left        Family History: family history includes Diabetes in her mother; Lung cancer in her brother and sister; Prostate cancer in her brother. Otherwise pertinent FHx was reviewed and not pertinent to current issue.    Social History:  reports that she quit smoking about 10 years ago. Her smoking use included cigarettes. She has never used smokeless tobacco. She reports that she does not drink alcohol and does not use drugs.    Home Medications:  Calcium, aspirin, atenolol, ferrous sulfate, glipizide, hydroCHLOROthiazide, lisinopril, metFORMIN, omeprazole, sennosides-docusate, simvastatin, and vitamin B-12      Allergies:  Allergies   Allergen Reactions   • Alendronate Unknown (See Comments)     \"gives me chest pain\"   • Latex Other (See Comments)     irritate skin   • Niacin And Related Hives   • Other Hives     Allergic to \"all mycins\"       Objective    Objective   Vitals:  Temp:  [97.7 °F (36.5 °C)-98.4 °F (36.9 °C)] 98.4 °F (36.9 °C)  Heart Rate:  [] 98  Resp:  [18-19] 18  BP: (136-224)/() 158/70    Physical Exam  Vitals reviewed.   Constitutional:       General: She is not in acute distress.     Appearance: Normal appearance. She is well-developed.   Cardiovascular:      Rate and Rhythm: Normal rate and regular rhythm.      Heart sounds: Normal heart sounds.   Pulmonary:      Effort: Pulmonary effort is normal. No respiratory distress.      Breath sounds: Normal breath sounds.   Abdominal:      General: Bowel sounds are normal.      Palpations: Abdomen is soft.   Musculoskeletal:      Comments: Right lower extremity:  Good distal pulses and sensation.  Good muscle tone and strength.  Stable joint exam.  Nontender on exam.    Left lower " extremity:  The left leg is shortened and externally rotated   Neurological:      Mental Status: She is alert and oriented to person, place, and time.   Psychiatric:         Behavior: Behavior normal.         Thought Content: Thought content normal.         Judgment: Judgment normal.         Result Review    Result Review:  I have personally reviewed the results from the time of this admission to 5/30/2023 07:42 CDT and agree with these findings:  [x]  Laboratory  []  Microbiology  [x]  Radiology  []  EKG/Telemetry   []  Cardiology/Vascular   []  Pathology  [x]  Old records  []  Other:  Most notable findings include:   XR Hip With or Without Pelvis 2 - 3 View Left    Result Date: 5/29/2023  Narrative: HISTORY: Fall, hip pain. COMPARISON: None. FINDINGS: AP view of the pelvis and lateral view of the left hip demonstrate left intertrochanteric fracture.  There is angulation.  There is no dislocation.     Impression: Acute left intertrochanteric fracture.    Results from last 7 days   Lab Units 05/30/23  0541 05/29/23  1818   WBC 10*3/mm3 9.30 14.19*   HEMOGLOBIN g/dL 11.3* 12.3   HEMATOCRIT % 33.6* 36.9   PLATELETS 10*3/mm3 242 253     Results from last 7 days   Lab Units 05/30/23  0541 05/29/23  1818   SODIUM mmol/L 131* 134*   POTASSIUM mmol/L 3.6 3.5   CHLORIDE mmol/L 94* 94*   CO2 mmol/L 25.0 22.0   BUN mg/dL 14 16   CREATININE mg/dL 0.59 0.65   CALCIUM mg/dL 9.4 9.9   BILIRUBIN mg/dL  --  0.2   ALK PHOS U/L  --  68   ALT (SGPT) U/L  --  15   AST (SGOT) U/L  --  19   GLUCOSE mg/dL 154* 199*     Results from last 7 days   Lab Units 05/29/23  1818   INR  0.90         Assessment & Plan   Assessment / Plan         Active Hospital Problems:  Active Hospital Problems    Diagnosis    • **Traumatic closed intertrochanteric fracture of left femur with minimal displacement, initial encounter    • Fall at home, initial encounter    • Primary hypertension    • Controlled type 2 diabetes mellitus without complication, without  long-term current use of insulin        Plan:     Susanna Carr is a 83 y.o. female who has a ground-level fall at home suffering a left intertrochanteric hip fracture.  Long discussion was held with patient regarding this being a fracture of necessity.  She is a mobile, active, independent woman who lives at home alone and she desires to return to that functional state.  It is necessary to proceed with surgical intervention to allow for this process to occur.    The patient voiced understanding of the risks, benefits, and alternative forms of treatment that were discussed and the patient consents to proceed with surgery.  All risks, benefits and alternatives were discussed.  Risks include, but not exclusive to anesthetic complications, including death, MI, CVA, infection, bleeding DVT, fracture, residual pain and need for future surgery.    This discussion was held with the patient by Dominick Monae MD and all questions were answered.    Plan HIP INTERTROCHANTERIC NAILING (Left)    We began the discussion for disposition postoperatively.  This includes the possibility of home with home health, ARU, as well as short-term SNF stay.  We also began the discussion for DVT prophylaxis postoperatively for 30 to 42 days pending mobility.    Electronically signed by Dominick Monae MD, 05/30/23, 7:42 AM CDT.

## 2023-05-30 NOTE — SIGNIFICANT NOTE
05/30/23 1150   OTHER   Discipline occupational therapist;physical therapist   Rehab Time/Intention   Session Not Performed other (see comments)     Patient is scheduled for surgery this afternoon. PT/OT to discharge orders and await weightbearing status and new orders post-surgery.

## 2023-05-30 NOTE — ANESTHESIA PREPROCEDURE EVALUATION
" Anesthesia Evaluation     Patient summary reviewed and Nursing notes reviewed   NPO Solid Status: > 8 hours  NPO Liquid Status: > 8 hours           Airway   Mallampati: II  TM distance: >3 FB  Neck ROM: full  No difficulty expected  Dental    (+) upper dentures, lower dentures and edentulous    Pulmonary     breath sounds clear to auscultation  (+) a smoker Former,   (-) COPD, asthma, rhonchi, decreased breath sounds, wheezes, pulmonary embolism  Cardiovascular   Exercise tolerance: good (4-7 METS)    NYHA Classification: II  ECG reviewed  Patient on routine beta blocker and Beta blocker given within 24 hours of surgery  Rhythm: regular  Rate: normal    (+) hypertension 2 medications or greater, valvular problems/murmurs murmur, murmur, hyperlipidemia,   (-) pacemaker, past MI, cardiac stents, CABG, DVT    ROS comment: EKG 5/30/2023:  Normal sinus rhythm  Cannot rule out Anterior infarct , age undetermined  Abnormal ECG  No previous ECGs available    Neuro/Psych- negative ROS  (-) seizures, TIA, CVA  GI/Hepatic/Renal/Endo    (+)  GERD well controlled,  diabetes mellitus type 2 well controlled,   (-)  obesity    Musculoskeletal     (+) back pain,   Abdominal  - normal exam   Substance History - negative use     OB/GYN negative ob/gyn ROS         Other - negative ROS       ROS/Med Hx Other: FINDINGS:  AP view of the pelvis and lateral view of the left hip demonstrate left  intertrochanteric fracture.  There is angulation.  There is no dislocation.     IMPRESSION:  Acute left intertrochanteric fracture.    Last EcfH9J=3.5    Hx of \"irregular Heart beat\" per pt its been controlled since being placed on atenolol      Phys Exam Other: Upper & lower dentures out                  Anesthesia Plan    ASA 3     general     (Hgb=11.3)  intravenous induction     Anesthetic plan, risks, benefits, and alternatives have been provided, discussed and informed consent has been obtained with: patient and child.  Pre-procedure " education provided  Use of blood products discussed with patient  Consented to blood products.   Plan discussed with CRNA.

## 2023-05-30 NOTE — OP NOTE
Name: Susanna Carr  YOB: 1940    DATE OF SURGERY: 5/30/2023    PREOPERATIVE DIAGNOSIS:  Closed fracture of left hip, initial encounter [S72.002A]    POSTOPERATIVE DIAGNOSIS:  Post-Op Diagnosis Codes:     * Closed fracture of left hip, initial encounter [S72.002A]    PROCEDURE PERFORMED:  Procedure(s) (LRB):  LEFT HIP INTERTROCHANTERIC NAILING (Left)      SURGEON: Dominick Monae MD    Assistant: Brittany Davis CSA was responsible for performing the following activities: Retraction, Suction, Irrigation, Suturing, Closing and Placing Dressing and their skilled assistance was necessary for the success of this case.     Anesthesia: General    IMPLANTS:   Implant Name Type Inv. Item Serial No.  Lot No. LRB No. Used Action   NAIL FEM TROCH GAMMA3 TI 125DEG [id number] STRL - [id number] Implant NAIL FEM TROCH GAMMA3 TI 125DEG [id number] STRL  MARTHA DEMETRA [id number] Left 1 Implanted   SCRW LK FUL/THRD 5X35MM STRL - [id number] Implant SCRW LK FUL/THRD 5X35MM STRL  MARTHA DEMETRA [id number] Left 1 Implanted   SCRW LAG GAM3 TI 10.5X95MM STRL - [id number] Implant SCRW LAG GAM3 TI 10.5X95MM STRL  MARTHA DEMETRA [id number] Left 1 Implanted       Estimated Blood Loss: 100ml    Specimens :  None    Complications: None    DESCRIPTION OF PROCEDURE:      The patient was taken to the operating room and placed in the supine position.  Preoperative antibiotics were administered. Surgical timeout was performed. After adequate induction of anesthesia the patient was then transferred onto the fracture table and positioned appropriately in the supine position. All bony prominences were well padded.  The Left hip was then prepped and draped in the usual sterile fashion.  C-arm image intensification was then used to confirm proper reduction with indirect traction.  The AP and lateral images demonstrated near anatomic reduction.      A small stab wound was then made a few centimeters proximal to the tip of the greater  trochanter.  The deep fascia was then incised in line with the skin incision.  A curved Hernandez scissor was then inserted to the tip of the greater trochanter and spread dividing the gluteal muscle.  We then inserted a guide pin at the tip of the greater trochanter as visualized on the AP and lateral C-arm views.  The guide pin was then advanced and images confirmed proper position.  We then over reamed the guide pin with the large reamer.  A size 13mm reamer was then passed by hand to ensure clear passage of the shira.  The nail was then chosen, assembled on the back table, and inserted into the shaft of the femur.  We used C-arm image intensification to confirm that the nail had seated appropriately.   We again used C-arm image intensification to make sure that the fracture did not displace.   We used the proximal targeting arm to appropriately position the guide pin for the femoral neck screw.  We made sure that the tip of the guide pin was at the center position on AP and lateral views.  The guide pin was then measured, over drilled, and the final lag screw was placed over the guide pin.  We used C-arm image intensification to confirm that the guide pin did not advance through the femoral head.  After the lag screw had been placed in the appropriate position C-arm image intensification was used to confirm and then the locking bolt was seated fully to lock the lag screw.  The proximal targeting arm was then used to place the distal locking screw into the shaft of the femur.  The guide arm was then removed and the wounds were copiously irrigated with pulsatile lavage. C-arm images were taken of the hip which confirmed near anatomic reduction of the fracture and proper position of the hardware.     All wounds were then copiously irrigated with saline and injected with half percent Marcaine solution.  Sterile dressings were applied and the patient was then removed from the fracture table and transferred to PACU for  recovery from anesthesia.  At the end of the case the sponge and needle counts were reported to me as being correct and there were no known complications.      Dominick Monae MD    05/30/23 at 15:12 CDT by Dominick Monae MD

## 2023-05-30 NOTE — PLAN OF CARE
Goal Outcome Evaluation:  Plan of Care Reviewed With: patient           Outcome Evaluation: VSS. Pt BP has improved this shift. Pt resting well between care. No c/o pain this shift. No new complaints.

## 2023-05-30 NOTE — ANESTHESIA PROCEDURE NOTES
Airway  Urgency: elective    Date/Time: 5/30/2023 2:21 PM  Airway not difficult    General Information and Staff    Patient location during procedure: OR  CRNA/CAA: Vita Wright CRNA  SRNA: Анна Collazo SRNA  Indications and Patient Condition  Indications for airway management: airway protection    Preoxygenated: yes  Mask difficulty assessment: 0 - not attempted    Final Airway Details  Final airway type: supraglottic airway      Successful airway: I-gel  Size 3     Number of attempts at approach: 1  Assessment: lips, teeth, and gum same as pre-op

## 2023-05-30 NOTE — ANESTHESIA POSTPROCEDURE EVALUATION
Patient: Susanna Carr    Procedure Summary     Date: 05/30/23 Room / Location: [facility] OR  / [facility] OR    Anesthesia Start: 1415 Anesthesia Stop: 1519    Procedure: LEFT HIP INTERTROCHANTERIC NAILING (Left: Hip) Diagnosis:       Closed fracture of left hip, initial encounter      (Closed fracture of left hip, initial encounter [S72.002A])    Surgeons: Dominick Monae MD Provider: Vita Wright CRNA    Anesthesia Type: general ASA Status: 3          Anesthesia Type: general    Vitals  Vitals Value Taken Time   /81 05/30/23 1520   Temp 97.8 °F (36.6 °C) 05/30/23 1520   Pulse 87 05/30/23 1520   Resp 14 05/30/23 1520   SpO2 100 % 05/30/23 1520           Post Anesthesia Care and Evaluation    Patient location during evaluation: PACU  Patient participation: waiting for patient participation  Level of consciousness: sleepy but conscious  Pain management: adequate    Airway patency: patent  Anesthetic complications: No anesthetic complications  PONV Status: none  Cardiovascular status: acceptable and hemodynamically stable  Respiratory status: acceptable, face mask and spontaneous ventilation  Hydration status: acceptable    Comments: ---------------------------               05/30/23                      1520         ---------------------------   BP:          176/81         Pulse:         87           Resp:          14           Temp:   97.8 °F (36.6 °C)   SpO2:         100%         ---------------------------

## 2023-05-30 NOTE — PLAN OF CARE
Goal Outcome Evaluation:              Outcome Evaluation: admitted to floor, no fall, no new skin issues, hip surgery today, able to communicate needs

## 2023-05-30 NOTE — SIGNIFICANT NOTE
05/30/23 1024   OTHER   Discipline occupational therapist;physical therapist   Rehab Time/Intention   Session Not Performed other (see comments)     Skilled PT/OT discharged. Awaiting new orders from surgeon following surgery today. Thank you.

## 2023-05-30 NOTE — PROGRESS NOTES
[facility] Medicine   INPATIENT PROGRESS NOTE      Patient Name: Susanna Carr  Date of Admission: 5/29/2023  Today's Date: 05/30/23  Length of Stay: 0  Primary Care Physician: Troy Cheng MD    Subjective   Chief Complaint and HPI:  Patient admitted on 5/29/2023 with chief complaint of fall, mechanical fall with left hip pain.  Work-up shows left-sided intertrochanteric fracture.  Past medical history includes diabetes, hypertension.  On baby aspirin only at home.    5/30/2023: Doing well today.  To go to the OR later today.  Pain well controlled.      Review of Systems   Review of Systems as of 05/30/23   All pertinent negatives and positives are as above. All other systems have been reviewed and are negative unless otherwise stated.     Objective    Temp:  [97.7 °F (36.5 °C)-98.6 °F (37 °C)] 98.6 °F (37 °C)  Heart Rate:  [] 98  Resp:  [18-19] 18  BP: (136-224)/() 182/72  Physical Exam    Vitals and nursing note reviewed.   Constitutional:       General: No acute distress.     Appearance: Normal appearance.   HENT:      Head: Normocephalic and atraumatic.      Mouth: Mucous membranes are moist.   Eyes:      Conjunctivae/sclerae: Conjunctivae normal.      Pupils: Pupils are equal, round, and reactive to light.   Cardiovascular:      Rate and Rhythm: Normal rate and regular rhythm.   Pulmonary:      Effort: Pulmonary effort is normal.      Breath sounds: Normal breath sounds.   Abdominal:      General: Abdomen is flat.      Palpations: Abdomen is soft.      Tenderness: No abdominal tenderness.   Genitourinary:     General:   normal  Musculoskeletal:    Tenderness to palpation of the left-sided hip     General: No signs of injury. Normal range of motion.   Skin:     General: Skin is warm and dry.   Neurological:      General: No focal deficit present.      Mental Status: Alert and oriented  Psychiatric:         Mood and Affect: Mood  normal.         Behavior: Behavior normal.         Results Review:  I have reviewed the labs, radiology results, and diagnostic studies.    Laboratory Data:   Results from last 7 days   Lab Units 05/30/23  0541 05/29/23  1818   WBC 10*3/mm3 9.30 14.19*   HEMOGLOBIN g/dL 11.3* 12.3   HEMATOCRIT % 33.6* 36.9   PLATELETS 10*3/mm3 242 253        Results from last 7 days   Lab Units 05/30/23  0541 05/29/23  1818   SODIUM mmol/L 131* 134*   POTASSIUM mmol/L 3.6 3.5   CHLORIDE mmol/L 94* 94*   CO2 mmol/L 25.0 22.0   BUN mg/dL 14 16   CREATININE mg/dL 0.59 0.65   CALCIUM mg/dL 9.4 9.9   BILIRUBIN mg/dL  --  0.2   ALK PHOS U/L  --  68   ALT (SGPT) U/L  --  15   AST (SGOT) U/L  --  19   GLUCOSE mg/dL 154* 199*       Culture Data:   No results found for: BLOODCX  No results found for: URINECX  No results found for: RESPCX  No results found for: WOUNDCX  No results found for: STOOLCX  No components found for: BODYFLD    Radiology Data:   Imaging Results (Last 24 Hours)     Procedure Component Value Units Date/Time    XR Hip With or Without Pelvis 2 - 3 View Left [[id number]] Collected: 05/29/23 1803     Updated: 05/29/23 1909    Narrative:      HISTORY:  Fall, hip pain.    COMPARISON:  None.    FINDINGS:  AP view of the pelvis and lateral view of the left hip demonstrate left  intertrochanteric fracture.  There is angulation.  There is no dislocation.      Impression:      Acute left intertrochanteric fracture.          I have reviewed the patient's current medications.     Assessment/Plan     I have utilized all available immediate resources to obtain, update, or review the patient's current medications (including all prescriptions, over-the-counter products, herbals, cannabis/cannabidiol products, and vitamin/mineral/dietary (nutritional) supplements).      Active Hospital Problems    Diagnosis    • **Traumatic closed intertrochanteric fracture of left femur with minimal displacement, initial encounter    • Fall at home,  initial encounter    • Primary hypertension    • Controlled type 2 diabetes mellitus without complication, without long-term current use of insulin        Medical Decision Making  Number and Complexity of problems: As below    Conditions and Status:        Condition is improving.  Condition is at treatment goal.     [facility] Data  External documents reviewed: None today  My EKG interpretation: None today  My plain film interpretation: None today  Tests considered but not ordered: None today       Discussed with: Patient, attending      Treatment Plan  As below    Care Planning  Shared decision making: With patient  Code status and discussions: Full code, CPR    Disposition  Social Determinants of Health that impact treatment or disposition:   I expect the patient to be discharged to home versus rehab to home in 2-3 days.      I confirmed that the patient's Advance Care Plan is present, code status is documented, or surrogate decision maker is listed in the patient's medical record.   ________________________________      1. Left Hip fracture              -Orthopedics consulting; planning for OR tomorrow              -Pain and nausea control              -PT and OT after surgery; lives alone  5/30/2023: OR later today.  Pain well controlled.  Will need VTE prophylaxis, PT OT evaluation postop.                2. DM              -Consistent carbohydrate diet, Acchecks with sliding scale insulin     3. HTN              -Continue home meds; PRN hydralazine         VTE PPx: SCDs      Copied text in this note has been reviewed and is accurate as of 05/30/23       Electronically signed by Indu Aj PA-C, 05/30/23, 09:39 CDT.

## 2023-05-31 LAB
ANION GAP SERPL CALCULATED.3IONS-SCNC: 15 MMOL/L (ref 5–15)
BASOPHILS # BLD AUTO: 0.07 10*3/MM3 (ref 0–0.2)
BASOPHILS NFR BLD AUTO: 0.6 % (ref 0–1.5)
BUN SERPL-MCNC: 12 MG/DL (ref 8–23)
BUN/CREAT SERPL: 17.6 (ref 7–25)
CALCIUM SPEC-SCNC: 8.5 MG/DL (ref 8.6–10.5)
CHLORIDE SERPL-SCNC: 94 MMOL/L (ref 98–107)
CO2 SERPL-SCNC: 21 MMOL/L (ref 22–29)
CREAT SERPL-MCNC: 0.68 MG/DL (ref 0.57–1)
DEPRECATED RDW RBC AUTO: 36.6 FL (ref 37–54)
EGFRCR SERPLBLD CKD-EPI 2021: 86 ML/MIN/1.73
EOSINOPHIL # BLD AUTO: 0 10*3/MM3 (ref 0–0.4)
EOSINOPHIL NFR BLD AUTO: 0 % (ref 0.3–6.2)
ERYTHROCYTE [DISTWIDTH] IN BLOOD BY AUTOMATED COUNT: 12.9 % (ref 12.3–15.4)
GLUCOSE BLDC GLUCOMTR-MCNC: 280 MG/DL (ref 70–130)
GLUCOSE BLDC GLUCOMTR-MCNC: 323 MG/DL (ref 70–130)
GLUCOSE BLDC GLUCOMTR-MCNC: 344 MG/DL (ref 70–130)
GLUCOSE BLDC GLUCOMTR-MCNC: 383 MG/DL (ref 70–130)
GLUCOSE SERPL-MCNC: 297 MG/DL (ref 65–99)
HCT VFR BLD AUTO: 27.6 % (ref 34–46.6)
HGB BLD-MCNC: 9.5 G/DL (ref 12–15.9)
IMM GRANULOCYTES # BLD AUTO: 0.05 10*3/MM3 (ref 0–0.05)
IMM GRANULOCYTES NFR BLD AUTO: 0.5 % (ref 0–0.5)
LYMPHOCYTES # BLD AUTO: 1.96 10*3/MM3 (ref 0.7–3.1)
LYMPHOCYTES NFR BLD AUTO: 18.1 % (ref 19.6–45.3)
MCH RBC QN AUTO: 27.2 PG (ref 26.6–33)
MCHC RBC AUTO-ENTMCNC: 34.4 G/DL (ref 31.5–35.7)
MCV RBC AUTO: 79.1 FL (ref 79–97)
MONOCYTES # BLD AUTO: 1.26 10*3/MM3 (ref 0.1–0.9)
MONOCYTES NFR BLD AUTO: 11.6 % (ref 5–12)
NEUTROPHILS NFR BLD AUTO: 69.2 % (ref 42.7–76)
NEUTROPHILS NFR BLD AUTO: 7.5 10*3/MM3 (ref 1.7–7)
NRBC BLD AUTO-RTO: 0 /100 WBC (ref 0–0.2)
PLATELET # BLD AUTO: 198 10*3/MM3 (ref 140–450)
PMV BLD AUTO: 10.7 FL (ref 6–12)
POTASSIUM SERPL-SCNC: 3.9 MMOL/L (ref 3.5–5.2)
RBC # BLD AUTO: 3.49 10*6/MM3 (ref 3.77–5.28)
SODIUM SERPL-SCNC: 130 MMOL/L (ref 136–145)
WBC NRBC COR # BLD: 10.84 10*3/MM3 (ref 3.4–10.8)

## 2023-05-31 PROCEDURE — 25010000002 CEFAZOLIN PER 500 MG: Performed by: ORTHOPAEDIC SURGERY

## 2023-05-31 PROCEDURE — 97162 PT EVAL MOD COMPLEX 30 MIN: CPT

## 2023-05-31 PROCEDURE — 25010000002 HEPARIN (PORCINE) PER 1000 UNITS: Performed by: ORTHOPAEDIC SURGERY

## 2023-05-31 PROCEDURE — 80048 BASIC METABOLIC PNL TOTAL CA: CPT | Performed by: ORTHOPAEDIC SURGERY

## 2023-05-31 PROCEDURE — 99024 POSTOP FOLLOW-UP VISIT: CPT | Performed by: ORTHOPAEDIC SURGERY

## 2023-05-31 PROCEDURE — 63710000001 INSULIN ASPART PER 5 UNITS: Performed by: ORTHOPAEDIC SURGERY

## 2023-05-31 PROCEDURE — 82948 REAGENT STRIP/BLOOD GLUCOSE: CPT

## 2023-05-31 PROCEDURE — 97166 OT EVAL MOD COMPLEX 45 MIN: CPT

## 2023-05-31 PROCEDURE — 85025 COMPLETE CBC W/AUTO DIFF WBC: CPT | Performed by: ORTHOPAEDIC SURGERY

## 2023-05-31 RX ORDER — ACETAMINOPHEN 500 MG
1000 TABLET ORAL EVERY 6 HOURS
Status: DISCONTINUED | OUTPATIENT
Start: 2023-05-31 | End: 2023-06-02 | Stop reason: HOSPADM

## 2023-05-31 RX ORDER — SODIUM CHLORIDE 0.9 % (FLUSH) 0.9 %
3 SYRINGE (ML) INJECTION EVERY 12 HOURS
Status: DISCONTINUED | OUTPATIENT
Start: 2023-05-31 | End: 2023-06-02 | Stop reason: HOSPADM

## 2023-05-31 RX ADMIN — CEFAZOLIN 2 G: 10 INJECTION, POWDER, FOR SOLUTION INTRAVENOUS at 05:30

## 2023-05-31 RX ADMIN — PANTOPRAZOLE SODIUM 40 MG: 40 TABLET, DELAYED RELEASE ORAL at 05:19

## 2023-05-31 RX ADMIN — FERROUS SULFATE TAB EC 324 MG (65 MG FE EQUIVALENT) 324 MG: 324 (65 FE) TABLET DELAYED RESPONSE at 08:06

## 2023-05-31 RX ADMIN — OXYCODONE HYDROCHLORIDE 5 MG: 5 TABLET ORAL at 03:47

## 2023-05-31 RX ADMIN — ACETAMINOPHEN 1000 MG: 500 TABLET, FILM COATED ORAL at 13:28

## 2023-05-31 RX ADMIN — ATORVASTATIN CALCIUM 20 MG: 20 TABLET, FILM COATED ORAL at 08:06

## 2023-05-31 RX ADMIN — HEPARIN SODIUM 5000 UNITS: 5000 INJECTION INTRAVENOUS; SUBCUTANEOUS at 05:19

## 2023-05-31 RX ADMIN — ACETAMINOPHEN 1000 MG: 500 TABLET, FILM COATED ORAL at 20:45

## 2023-05-31 RX ADMIN — HYDROCHLOROTHIAZIDE 12.5 MG: 12.5 TABLET ORAL at 08:05

## 2023-05-31 RX ADMIN — DOCUSATE SODIUM 50 MG AND SENNOSIDES 8.6 MG 2 TABLET: 8.6; 5 TABLET, FILM COATED ORAL at 20:50

## 2023-05-31 RX ADMIN — ASPIRIN 81 MG: 81 TABLET, CHEWABLE ORAL at 08:06

## 2023-05-31 RX ADMIN — OXYCODONE HYDROCHLORIDE 5 MG: 5 TABLET ORAL at 15:15

## 2023-05-31 RX ADMIN — OXYCODONE HYDROCHLORIDE 5 MG: 5 TABLET ORAL at 20:50

## 2023-05-31 RX ADMIN — HEPARIN SODIUM 5000 UNITS: 5000 INJECTION INTRAVENOUS; SUBCUTANEOUS at 21:14

## 2023-05-31 RX ADMIN — ACETAMINOPHEN 1000 MG: 500 TABLET, FILM COATED ORAL at 08:05

## 2023-05-31 RX ADMIN — ATENOLOL 12.5 MG: 25 TABLET ORAL at 08:06

## 2023-05-31 RX ADMIN — LISINOPRIL 2.5 MG: 2.5 TABLET ORAL at 08:06

## 2023-05-31 RX ADMIN — Medication 10 ML: at 08:06

## 2023-05-31 RX ADMIN — OXYCODONE HYDROCHLORIDE 5 MG: 5 TABLET ORAL at 08:48

## 2023-05-31 RX ADMIN — DOCUSATE SODIUM 50 MG AND SENNOSIDES 8.6 MG 2 TABLET: 8.6; 5 TABLET, FILM COATED ORAL at 08:05

## 2023-05-31 RX ADMIN — INSULIN ASPART 7 UNITS: 100 INJECTION, SOLUTION INTRAVENOUS; SUBCUTANEOUS at 11:06

## 2023-05-31 RX ADMIN — Medication 10 ML: at 20:50

## 2023-05-31 RX ADMIN — HEPARIN SODIUM 5000 UNITS: 5000 INJECTION INTRAVENOUS; SUBCUTANEOUS at 13:28

## 2023-05-31 RX ADMIN — INSULIN ASPART 6 UNITS: 100 INJECTION, SOLUTION INTRAVENOUS; SUBCUTANEOUS at 08:06

## 2023-05-31 RX ADMIN — INSULIN ASPART 8 UNITS: 100 INJECTION, SOLUTION INTRAVENOUS; SUBCUTANEOUS at 16:53

## 2023-05-31 NOTE — THERAPY EVALUATION
Patient Name: Susanna Carr  : 1940    MRN: [id number]                              Today's Date: 2023       Admit Date: 2023    Visit Dx:     ICD-10-CM ICD-9-CM   1. Closed fracture of left hip, initial encounter  S72.002A 820.8   2. Impaired mobility and ADLs  Z74.09 V49.89    Z78.9    3. Impaired functional mobility, balance, gait, and endurance  Z74.09 V49.89     Patient Active Problem List   Diagnosis   • Closed fracture of ramus of left pubis   • Pathological fracture of vertebra, initial encounter   • Closed T12 spinal fracture   • Closed fracture of second lumbar vertebra   • Primary hypertension   • Controlled type 2 diabetes mellitus without complication, without long-term current use of insulin   • Osteoporosis, senile   • Traumatic closed intertrochanteric fracture of left femur with minimal displacement, initial encounter   • Fall at home, initial encounter   • Closed fracture of left hip, initial encounter     Past Medical History:   Diagnosis Date   • Cataract    • Diabetes mellitus    • Hyperlipidemia    • Hypertension    • Irregular heart beat    • Osteoporosis    • Skin cancer    • Wears dentures     upper and lower     Past Surgical History:   Procedure Laterality Date   • CATARACT EXTRACTION W/ INTRAOCULAR LENS IMPLANT Left 2020    Procedure: REMOVE CATARACT AND IMPLANT  INTRAOCULAR LENS;  Surgeon: Juan Tineo MD;  Location: [facility];  Service: Ophthalmology;  Laterality: Left;   • CATARACT EXTRACTION W/ INTRAOCULAR LENS IMPLANT Right 10/2/2020    Procedure: REMOVE CATARACT AND IMPLANT INTRAOCULAR LENS;  Surgeon: Juan Tineo MD;  Location: [facility];  Service: Ophthalmology;  Laterality: Right;   • CHOLECYSTECTOMY     • ORIF ANKLE FRACTURE Left       General Information     Row Name 23 0905          Physical Therapy Time and Intention    Document Type evaluation  -CZ     Mode of Treatment physical therapy;occupational therapy  -CZ     Row  Name 05/31/23 0905          General Information    Patient Profile Reviewed yes  -CZ     Prior Level of Function independent:;all household mobility  -CZ     Existing Precautions/Restrictions fall  -CZ     Barriers to Rehab cognitive status  -CZ     Row Name 05/31/23 0905          Living Environment    People in Home alone  -CZ     Row Name 05/31/23 0905          Home Main Entrance    Number of Stairs, Main Entrance none  -CZ     Row Name 05/31/23 0905          Stairs Within Home, Primary    Stairs, Within Home, Primary Lives alone but daughter staying with patient during the day, grand daughter at night. May stay at patient's daughter's home. No stairs. (I) ambulation without an AD, uses SPC outside, also has FWW and 4WW available, BSC available.  -CZ     Number of Stairs, Within Home, Primary none  -CZ     Row Name 05/31/23 0905          Cognition    Orientation Status (Cognition) oriented x 4  -CZ     Row Name 05/31/23 0905          Safety Issues, Functional Mobility    Impairments Affecting Function (Mobility) strength;endurance/activity tolerance;pain;balance  -CZ     Comment, Safety Issues/Impairments (Mobility) Oriented but forgetful.  -CZ           User Key  (r) = Recorded By, (t) = Taken By, (c) = Cosigned By    Initials Name Provider Type    CZ Alexey Gamez, PT Physical Therapist               Mobility     Row Name 05/31/23 0905          Sit-Stand Transfer    Sit-Stand [place] (Transfers) minimum assist (75% patient effort);2 person assist  -CZ     Assistive Device (Sit-Stand Transfers) walker, front-wheeled  -CZ     Row Name 05/31/23 0905          Gait/Stairs (Locomotion)    [place] Level (Gait) minimum assist (75% patient effort);2 person assist  -CZ     Assistive Device (Gait) walker, front-wheeled  -CZ     Distance in Feet (Gait) 5'x1.  -CZ     Comment, (Gait/Stairs) Good SLS but poor ability to advance LLE, cues for upright posture, fatigues very easily.  -CZ     Row Name 05/31/23 0905           Mobility    Extremity Weight-bearing Status left lower extremity  -CZ     Left Lower Extremity (Weight-bearing Status) weight-bearing as tolerated (WBAT)  -CZ           User Key  (r) = Recorded By, (t) = Taken By, (c) = Cosigned By    Initials Name Provider Type    CZ Alexey Gamez, PT Physical Therapist               Obj/Interventions     Row Name 05/31/23 0905          Range of Motion Comprehensive    General Range of Motion bilateral lower extremity ROM WFL  -[facility] Name 05/31/23 0905          Strength Comprehensive (MMT)    Comment, General Manual Muscle Testing (MMT) Assessment LLE: 3-/5, RLE: 3/5 grossly.  -     Row Name 05/31/23 0905          Sensory Assessment (Somatosensory)    Sensory Assessment (Somatosensory) LE sensation intact  -CZ           User Key  (r) = Recorded By, (t) = Taken By, (c) = Cosigned By    Initials Name Provider Type    CZ Alexey Gamez, PT Physical Therapist               Goals/Plan     [facility] Name 05/31/23 0905          Bed Mobility Goal 1 (PT)    Activity/Assistive Device (Bed Mobility Goal 1, PT) sit to supine/supine to sit  -CZ     [place] Level/Cues Needed (Bed Mobility Goal 1, PT) independent  -CZ     Time Frame (Bed Mobility Goal 1, PT) by discharge  -CZ     Strategies/Barriers (Bed Mobility Goal 1, PT) L hip fracture, ORIF, WBAT.  -CZ     Progress/Outcomes (Bed Mobility Goal 1, PT) goal not met  -[facility] Name 05/31/23 0905          Transfer Goal 1 (PT)    Activity/Assistive Device (Transfer Goal 1, PT) sit-to-stand/stand-to-sit;bed-to-chair/chair-to-bed;walker, rolling  -CZ     [place] Level/Cues Needed (Transfer Goal 1, PT) modified independence  -CZ     Time Frame (Transfer Goal 1, PT) by discharge  -CZ     Strategies/Barriers (Transfers Goal 1, PT) L hip fracture, ORIF, WBAT.  -CZ     Progress/Outcome (Transfer Goal 1, PT) goal not met  -     Row Name 05/31/23 0905          Gait Training Goal 1 (PT)    Activity/Assistive Device (Gait  Training Goal 1, PT) walker, rolling  -CZ     [place] Level (Gait Training Goal 1, PT) modified independence  -CZ     Distance (Gait Training Goal 1, PT) 75' x 2.  -CZ     Time Frame (Gait Training Goal 1, PT) by discharge  -CZ     Strategies/Barriers (Gait Training Goal 1, PT) L hip fracture, ORIF, WBAT.  -CZ     Progress/Outcome (Gait Training Goal 1, PT) goal not met  -CZ     Row Name 05/31/23 0905          Therapy Assessment/Plan (PT)    Planned Therapy Interventions (PT) balance training;bed mobility training;gait training;home exercise program;patient/family education;transfer training;stair training;strengthening;stretching;ROM (range of motion)  -CZ           User Key  (r) = Recorded By, (t) = Taken By, (c) = Cosigned By    Initials Name Provider Type    CZ Alexey Gamez, PT Physical Therapist               Clinical Impression     Row Name 05/31/23 0905          Pain    Pretreatment Pain Rating 0/10 - no pain  -CZ     Posttreatment Pain Rating 10/10  -CZ     Pain Location - Side/Orientation Left  -CZ     Pain Location - hip  -CZ     Pain Intervention(s) Medication (See MAR);Repositioned;Ambulation/increased activity;Distraction  -CZ     Row Name 05/31/23 0905          Plan of Care Review    Plan of Care Reviewed With patient  -CZ     Outcome Evaluation Initial PT evaluation complete, co-evaluation with OT.  Patient is alert, cooperative, forgetful.  She requires min Ax1 with transfers and gait, ambulates 5'x1 with FWW, poor ability to advance LLE, limited by pain and nausea.  Patient will have her daughter and grand daughter to assist her upon discharge, has FWW and 4WW available at home.  However, patient is quite debilitated, limited by weakness and nausea, requires Ax2.  She will likely need rehab prior to discharge home with family.  Goals established, continue skilled I/P PT.  -CZ     Row Name 05/31/23 0905          Therapy Assessment/Plan (PT)    Rehab Potential (PT) good, to achieve stated  therapy goals  -CZ     Criteria for Skilled Interventions Met (PT) yes;skilled treatment is necessary  -CZ     Therapy Frequency (PT) daily  -CZ     Row Name 05/31/23 0905          Vital Signs    Pre Systolic BP Rehab 126  -CZ     Pre Treatment Diastolic BP 60  -CZ     Pretreatment Heart Rate (beats/min) 111  -CZ     Pre SpO2 (%) 96  -CZ     O2 Delivery Pre Treatment room air  -CZ     Pre Patient Position Sitting  -CZ     Row Name 05/31/23 0905          Positioning and Restraints    Pre-Treatment Position sitting in chair/recliner  -CZ     Post Treatment Position chair  -CZ     In Chair reclined;exit alarm on;encouraged to call for assist;call light within reach  -CZ           User Key  (r) = Recorded By, (t) = Taken By, (c) = Cosigned By    Initials Name Provider Type    Alexey Kim, PT Physical Therapist               Outcome Measures     Row Name 05/31/23 0905 05/31/23 0759       How much help from another person do you currently need...    Turning from your back to your side while in flat bed without using bedrails? 3  -CZ 2  -MM    Moving from lying on back to sitting on the side of a flat bed without bedrails? 3  -CZ 2  -MM    Moving to and from a bed to a chair (including a wheelchair)? 3  -CZ 2  -MM    Standing up from a chair using your arms (e.g., wheelchair, bedside chair)? 3  -CZ 2  -MM    Climbing 3-5 steps with a railing? 3  -CZ 2  -MM    To walk in hospital room? 3  -CZ 2  -MM    AM-PAC 6 Clicks Score (PT) 18  -CZ 12  -MM    Highest level of mobility 6 --> Walked 10 steps or more  -CZ 4 --> Transferred to chair/commode  -MM    Row Name 05/31/23 0906 05/31/23 0905       Functional Assessment    Outcome Measure Options AM-PAC 6 Clicks Daily Activity (OT)  -SA AM-PAC 6 Clicks Basic Mobility (PT)  -CZ          User Key  (r) = Recorded By, (t) = Taken By, (c) = Cosigned By    Initials Name Provider Type    Elyssa Cervantes, RN Registered Nurse    Alexey Kim, PT Physical Therapist    SA  Angelic Laboy, OSCAR Occupational Therapist                             Physical Therapy Education     Title: PT OT SLP Therapies (In Progress)     Topic: Physical Therapy (In Progress)     Point: Mobility training (In Progress)     Learning Progress Summary           Patient Acceptance, E, NR by  at 5/31/2023 0920    Comment: PT POC, hand placement with transfers, use of walker, posture with gait, rehab prior to discharge home.                   Point: Home exercise program (Not Started)     Learner Progress:  Not documented in this visit.          Point: Body mechanics (Not Started)     Learner Progress:  Not documented in this visit.          Point: Precautions (Not Started)     Learner Progress:  Not documented in this visit.                      User Key     Initials Effective Dates Name Provider Type Discipline     09/18/22 -  Alexey Gamez, PT Physical Therapist PT              PT Recommendation and Plan  Planned Therapy Interventions (PT): balance training, bed mobility training, gait training, home exercise program, patient/family education, transfer training, stair training, strengthening, stretching, ROM (range of motion)  Plan of Care Reviewed With: patient  Outcome Evaluation: Initial PT evaluation complete, co-evaluation with OT.  Patient is alert, cooperative, forgetful.  She requires min Ax1 with transfers and gait, ambulates 5'x1 with FWW, poor ability to advance LLE, limited by pain and nausea.  Patient will have her daughter and grand daughter to assist her upon discharge, has FWW and 4WW available at home.  However, patient is quite debilitated, limited by weakness and nausea, requires Ax2.  She will likely need rehab prior to discharge home with family.  Goals established, continue skilled I/P PT.     Time Calculation:    PT Charges     Row Name 05/31/23 1219             Time Calculation    Start Time 0905  -      Stop Time 0944  -      Time Calculation (min) 39 min  -CZ      PT  Received On 05/31/23  -CZ      PT Goal Re-Cert Due Date 06/13/23  -CZ         Untimed Charges    PT Eval/Re-eval Minutes 39  -CZ         Total Minutes    Untimed Charges Total Minutes 39  -CZ       Total Minutes 39  -CZ            User Key  (r) = Recorded By, (t) = Taken By, (c) = Cosigned By    Initials Name Provider Type    CZ Alexey Gamez, PT Physical Therapist              Therapy Charges for Today     Code Description Service Date Service Provider Modifiers Qty    [id number] HC PT EVAL MOD COMPLEXITY 3 5/31/2023 Alexey Gamez, PT GP 1          PT G-Codes  Outcome Measure Options: AM-PAC 6 Clicks Daily Activity (OT)  AM-PAC 6 Clicks Score (PT): 18  AM-PAC 6 Clicks Score (OT): 18  PT Discharge Summary  Anticipated Discharge Disposition (PT): skilled nursing facility, inpatient rehabilitation facility    Alexey Gamez, NURY  5/31/2023

## 2023-05-31 NOTE — PLAN OF CARE
Goal Outcome Evaluation:  Plan of Care Reviewed With: patient           Outcome Evaluation: Initial PT evaluation complete, co-evaluation with OT.  Patient is alert, cooperative, forgetful.  She requires min Ax1 with transfers and gait, ambulates 5'x1 with FWW, poor ability to advance LLE, limited by pain and nausea.  Patient will have her daughter and grand daughter to assist her upon discharge, has FWW and 4WW available at home.  However, patient is quite debilitated, limited by weakness and nausea, requires Ax2.  She will likely need rehab prior to discharge home with family.  Goals established, continue skilled I/P PT.

## 2023-05-31 NOTE — PLAN OF CARE
Goal Outcome Evaluation:  Plan of Care Reviewed With: patient        Progress: improving Rested between care.Had grand-daughter visit early in the shift. Medicated x2 for pain with much relief voiced.Up to BSC once with assist 2 up and I back to bed with gait belt and walker.Has used external catheter well also.Bulky  Dressing CDI to Left outer thigh.

## 2023-05-31 NOTE — PROGRESS NOTES
ORTHOPEDIC PROGRESS NOTE:    Name:  Susanna Carr  Date:    2023  Date of admission:  2023    Post op day:  1 Day Post-Op  Procedure:    Procedure(s) (LRB):  LEFT HIP INTERTROCHANTERIC NAILING (Left)    Subjective:  No new complaints.  No fever or chills  Pain is improving.  Some reports of confusion overnight    Vitals:     Vitals:    23 0313   BP: 130/60   Pulse: 105   Resp: 18   Temp: 98.2 °F (36.8 °C)   SpO2: 96%      Temp (24hrs), Av.2 °F (36.8 °C), Min:97.5 °F (36.4 °C), Max:99.3 °F (37.4 °C)      Exam:  Awake and alert/oriented  Calves soft and nontender  Good distal pulses and sensation  Stable exam  Dressing clean and dry    ASSESSMENT:  Active Hospital Problems    Diagnosis  POA   • **Traumatic closed intertrochanteric fracture of left femur with minimal displacement, initial encounter [S72.142A]  Yes   • Fall at home, initial encounter [W19.XXXA, Y92.009]  Not Applicable   • Closed fracture of left hip, initial encounter [S72.002A]  Yes   • Primary hypertension [I10]  Yes   • Controlled type 2 diabetes mellitus without complication, without long-term current use of insulin [E11.9]  Yes         PLAN:    S/p IM shira left hip  Mobilize with PT/OT   WBAT   ROM as tolerated.   No true restrictions  DVT prophylaxis: subQ heparin until disposition determined   IF Home - will switch to eliquis   IF ARU/SNF - lovenox probably best option  Anticipate discharge 1-2 days.    23 at 07:13 CDT by Dominick Monae MD

## 2023-05-31 NOTE — PLAN OF CARE
Goal Outcome Evaluation:  Plan of Care Reviewed With: patient           Outcome Evaluation: OT eval completed as co-eval with PT. Patient sitting in chair and agreeable. Min A x2 assist required for sit<>stand. Mod A x2 required for step/pivot to BSC using FWW. Pt required assistance lifting R toes off floor. Min A to manage gown at toilet and complete gualberto care. Pt required SBA to doff<>don R sock. Dependent to don<>doff L sock per clinical judgement. Pt would benefit from continued skilled IP/OT to address decreased strength, ax tolerance, and independence with ADLs, IADLs, and transfers. Recommended d/c rehab prior to home such as ARU.

## 2023-05-31 NOTE — PLAN OF CARE
Goal Outcome Evaluation:              Outcome Evaluation: no fall, working with therapy, dressing dry and intact, meds given as needed, walker and gait belt

## 2023-05-31 NOTE — THERAPY EVALUATION
Patient Name: Susanna Carr  : 1940    MRN: [id number]                              Today's Date: 2023       Admit Date: 2023    Visit Dx:     ICD-10-CM ICD-9-CM   1. Closed fracture of left hip, initial encounter  S72.002A 820.8   2. Impaired mobility and ADLs  Z74.09 V49.89    Z78.9      Patient Active Problem List   Diagnosis   • Closed fracture of ramus of left pubis   • Pathological fracture of vertebra, initial encounter   • Closed T12 spinal fracture   • Closed fracture of second lumbar vertebra   • Primary hypertension   • Controlled type 2 diabetes mellitus without complication, without long-term current use of insulin   • Osteoporosis, senile   • Traumatic closed intertrochanteric fracture of left femur with minimal displacement, initial encounter   • Fall at home, initial encounter   • Closed fracture of left hip, initial encounter     Past Medical History:   Diagnosis Date   • Cataract    • Diabetes mellitus    • Hyperlipidemia    • Hypertension    • Irregular heart beat    • Osteoporosis    • Skin cancer    • Wears dentures     upper and lower     Past Surgical History:   Procedure Laterality Date   • CATARACT EXTRACTION W/ INTRAOCULAR LENS IMPLANT Left 2020    Procedure: REMOVE CATARACT AND IMPLANT  INTRAOCULAR LENS;  Surgeon: Juan Tineo MD;  Location: [facility];  Service: Ophthalmology;  Laterality: Left;   • CATARACT EXTRACTION W/ INTRAOCULAR LENS IMPLANT Right 10/2/2020    Procedure: REMOVE CATARACT AND IMPLANT INTRAOCULAR LENS;  Surgeon: Juan Tineo MD;  Location: [facility];  Service: Ophthalmology;  Laterality: Right;   • CHOLECYSTECTOMY     • ORIF ANKLE FRACTURE Left       General Information     Row Name 23 0906          OT Time and Intention    Document Type evaluation  -SA     Mode of Treatment physical therapy;occupational therapy  -SA     Row Name 23 0906          General Information    Patient Profile Reviewed yes  -SA      Prior Level of Function independent:;all household mobility;community mobility;gait;bed mobility;transfer;ADL's  -     Existing Precautions/Restrictions fall  -     Barriers to Rehab medically complex;cognitive status  -     Row Name 05/31/23 0906          Living Environment    People in Home alone  -     Row Name 05/31/23 0906          Home Main Entrance    Number of Stairs, Main Entrance none  -     Row Name 05/31/23 0906          Stairs Within Home, Primary    Stairs, Within Home, Primary Lives alone but daughter staying with patient during the day, grand daughter at night. May stay at patient's daughter's home. No stairs. (I) ambulation without an AD, uses SPC outside, also has FWW and 4WW available, BSC available  -     Number of Stairs, Within Home, Primary none  -     Row Name 05/31/23 0906          Cognition    Orientation Status (Cognition) oriented x 4  -     Row Name 05/31/23 0906          Safety Issues, Functional Mobility    Impairments Affecting Function (Mobility) strength;endurance/activity tolerance;pain;balance  -           User Key  (r) = Recorded By, (t) = Taken By, (c) = Cosigned By    Initials Name Provider Type     Angelic Laboy OT Occupational Therapist                 Mobility/ADL's     Row Name 05/31/23 0906          Bed Mobility    Bed Mobility bed mobility (all) activities  -     All Activities, [place] (Bed Mobility) not tested;other (see comments)  Pt found seated in chair.  -     Row Name 05/31/23 0906          Transfers    Transfers sit-stand transfer;stand-sit transfer;toilet transfer  -     Row Name 05/31/23 0906          Sit-Stand Transfer    Sit-Stand Bradford (Transfers) minimum assist (75% patient effort);2 person assist  -     Assistive Device (Sit-Stand Transfers) walker, front-wheeled  -     Row Name 05/31/23 0906          Stand-Sit Transfer    Stand-Sit [place] (Transfers) minimum assist (75% patient effort);2 person assist  -SA      Assistive Device (Stand-Sit Transfers) walker, front-wheeled  -[facility] Name 05/31/23 0906          Toilet Transfer    Type (Toilet Transfer) sit-stand;stand-sit;stand pivot/stand step  -     Pickett Level (Toilet Transfer) 2 person assist;moderate assist (50% patient effort)  -     Assistive Device (Toilet Transfer) commode, bedside without drop arms;walker, front-wheeled  -SA     Row Name 05/31/23 0906          Activities of Daily Living    BADL Assessment/Intervention lower body dressing  -SA     Row Name 05/31/23 0906          Mobility    Extremity Weight-bearing Status left lower extremity  -     Left Lower Extremity (Weight-bearing Status) weight-bearing as tolerated (WBAT)  -SA     Row Name 05/31/23 0906          Lower Body Dressing Assessment/Training    Pickett Level (Lower Body Dressing) doff;don;socks;other (see comments)  Dependent L SBA R  -SA     Position (Lower Body Dressing) supported sitting  -           User Key  (r) = Recorded By, (t) = Taken By, (c) = Cosigned By    Initials Name Provider Type     Angelic Laboy OT Occupational Therapist               Obj/Interventions     [facility] Name 05/31/23 0906          Sensory Assessment (Somatosensory)    Sensory Assessment (Somatosensory) UE sensation intact  -SA     Row Name 05/31/23 0906          Range of Motion Comprehensive    General Range of Motion bilateral upper extremity ROM WFL  -SA     Row Name 05/31/23 0906          Strength Comprehensive (MMT)    General Manual Muscle Testing (MMT) Assessment other (see comments)  -     Comment, General Manual Muscle Testing (MMT) Assessment BUE 3+/5 grossly  -           User Key  (r) = Recorded By, (t) = Taken By, (c) = Cosigned By    Initials Name Provider Type     Angelic Laboy OT Occupational Therapist               Goals/Plan     [facility] Name 05/31/23 0906          Transfer Goal 1 (OT)    Activity/Assistive Device (Transfer Goal 1, OT) bed-to-chair/chair-to-bed;commode, bedside  without drop arms  -SA     Rumely Level/Cues Needed (Transfer Goal 1, OT) standby assist  -SA     Time Frame (Transfer Goal 1, OT) long term goal (LTG);by discharge  -SA     Progress/Outcome (Transfer Goal 1, OT) goal not met  -     Row Name 05/31/23 0906          Bathing Goal 1 (OT)    Activity/Device (Bathing Goal 1, OT) lower body bathing  -SA     Rumely Level/Cues Needed (Bathing Goal 1, OT) modified independence  -SA     Time Frame (Bathing Goal 1, OT) long term goal (LTG);by discharge  -SA     Progress/Outcomes (Bathing Goal 1, OT) goal not met  -     Row Name 05/31/23 0906          Dressing Goal 1 (OT)    Activity/Device (Dressing Goal 1, OT) lower body dressing  -SA     Rumely/Cues Needed (Dressing Goal 1, OT) modified independence  -SA     Time Frame (Dressing Goal 1, OT) long term goal (LTG);by discharge  -SA     Progress/Outcome (Dressing Goal 1, OT) goal not met  -     Row Name 05/31/23 0906          Toileting Goal 1 (OT)    Activity/Device (Toileting Goal 1, OT) toileting skills, all  -SA     Rumely Level/Cues Needed (Toileting Goal 1, OT) independent  -SA     Time Frame (Toileting Goal 1, OT) long term goal (LTG);by discharge  -SA     Progress/Outcome (Toileting Goal 1, OT) goal not met  -     Row Name 05/31/23 0906          Therapy Assessment/Plan (OT)    Planned Therapy Interventions (OT) activity tolerance training;manual therapy/joint mobilization;patient/caregiver education/training;adaptive equipment training;neuromuscular control/coordination retraining;BADL retraining;cognitive/visual perception retraining;occupation/activity based interventions;ROM/therapeutic exercise;strengthening exercise;edema control/reduction;functional balance retraining;IADL retraining;passive ROM/stretching;transfer/mobility retraining  -           User Key  (r) = Recorded By, (t) = Taken By, (c) = Cosigned By    Initials Name Provider Type    Angelic Pro, OT Occupational  Therapist               Clinical Impression     [facility] Name 05/31/23 0906          Pain Assessment    Pretreatment Pain Rating 0/10 - no pain  -SA     Posttreatment Pain Rating 10/10  -SA     Pain Location - Side/Orientation Left  -SA     Pain Location - hip  -SA     Pain Intervention(s) Medication (See MAR);Repositioned;Ambulation/increased activity;Rest  -     Row Name 05/31/23 0906          Plan of Care Review    Plan of Care Reviewed With patient  -SA     Outcome Evaluation OT eval completed as co-eval with PT. Patient sitting in chair and agreeable. Min A x2 assist required for sit<>stand. Mod A x2 required for step/pivot to BSC using FWW. Pt required assistance lifting R toes off floor. Min A to manage gown at toilet and complete gualberto care. Pt required SBA to doff<>don R sock. Dependent to don<>doff L sock per clinical judgement. Pt would benefit from continued skilled IP/OT to address decreased strength, ax tolerance, and independence with ADLs, IADLs, and transfers. Recommended d/c rehab prior to home such as ARU.  -[facility] Name 05/31/23 0906          Therapy Assessment/Plan (OT)    Patient/Family Therapy Goal Statement (OT) return home  -     Rehab Potential (OT) good, to achieve stated therapy goals  -     Criteria for Skilled Therapeutic Interventions Met (OT) yes;meets criteria;skilled treatment is necessary  -     Therapy Frequency (OT) daily  -     Predicted Duration of Therapy Intervention (OT) until all goals met or d/c  -SA     [facility] Name 05/31/23 0906          Therapy Plan Review/Discharge Plan (OT)    Anticipated Discharge Disposition (OT) inpatient rehabilitation facility  -[facility] Name 05/31/23 0906          Vital Signs    Pre Systolic BP Rehab 126  -SA     Pre Treatment Diastolic BP 60  -SA     Pretreatment Heart Rate (beats/min) 111  -SA     Pre SpO2 (%) 96  -SA     O2 Delivery Pre Treatment room air  -SA     Pre Patient Position Sitting  -[facility] Name 05/31/23 0906           Positioning and Restraints    Pre-Treatment Position sitting in chair/recliner  -SA     Post Treatment Position chair  -SA     In Chair reclined;exit alarm on;call light within reach;encouraged to call for assist  -SA           User Key  (r) = Recorded By, (t) = Taken By, (c) = Cosigned By    Initials Name Provider Type    SA Angelic Laboy OT Occupational Therapist               Outcome Measures     Row Name 05/31/23 0906          How much help from another is currently needed...    Putting on and taking off regular lower body clothing? 2  -SA     Bathing (including washing, rinsing, and drying) 2  -SA     Toileting (which includes using toilet bed pan or urinal) 3  -SA     Putting on and taking off regular upper body clothing 3  -SA     Taking care of personal grooming (such as brushing teeth) 4  -SA     Eating meals 4  -SA     AM-PAC 6 Clicks Score (OT) 18  -SA     Row Name 05/31/23 0905 05/31/23 0759       How much help from another person do you currently need...    Turning from your back to your side while in flat bed without using bedrails? 3  -CZ 2  -MM    Moving from lying on back to sitting on the side of a flat bed without bedrails? 3  -CZ 2  -MM    Moving to and from a bed to a chair (including a wheelchair)? 3  -CZ 2  -MM    Standing up from a chair using your arms (e.g., wheelchair, bedside chair)? 3  -CZ 2  -MM    Climbing 3-5 steps with a railing? 3  -CZ 2  -MM    To walk in hospital room? 3  -CZ 2  -MM    AM-PAC 6 Clicks Score (PT) 18  -CZ 12  -MM    Highest level of mobility 6 --> Walked 10 steps or more  -CZ 4 --> Transferred to chair/commode  -MM    Row Name 05/31/23 0906 05/31/23 0905       Functional Assessment    Outcome Measure Options AM-PAC 6 Clicks Daily Activity (OT)  -SA AM-PAC 6 Clicks Basic Mobility (PT)  -CZ          User Key  (r) = Recorded By, (t) = Taken By, (c) = Cosigned By    Initials Name Provider Type    Elyssa Cervantes, RN Registered Nurse    CZ Alexey Gamez, PT  Physical Therapist    Angelic Pro OT Occupational Therapist                Occupational Therapy Education     Title: PT OT SLP Therapies (In Progress)     Topic: Occupational Therapy (In Progress)     Point: ADL training (Done)     Description:   Instruct learner(s) on proper safety adaptation and remediation techniques during self care or transfers.   Instruct in proper use of assistive devices.              Learning Progress Summary           Patient Acceptance, E,TB, VU by  at 5/31/2023 0949    Comment: OT POC, Role of OT, transfer training                   Point: Home exercise program (Not Started)     Description:   Instruct learner(s) on appropriate technique for monitoring, assisting and/or progressing therapeutic exercises/activities.              Learner Progress:  Not documented in this visit.          Point: Precautions (Done)     Description:   Instruct learner(s) on prescribed precautions during self-care and functional transfers.              Learning Progress Summary           Patient Acceptance, E,TB, VU by  at 5/31/2023 0949    Comment: OT POC, Role of OT, transfer training                   Point: Body mechanics (Done)     Description:   Instruct learner(s) on proper positioning and spine alignment during self-care, functional mobility activities and/or exercises.              Learning Progress Summary           Patient Acceptance, E,TB, VU by  at 5/31/2023 0949    Comment: OT POC, Role of OT, transfer training                               User Key     Initials Effective Dates Name Provider Type Discipline     08/11/22 -  Angelic Laboy OT Occupational Therapist OT              OT Recommendation and Plan  Planned Therapy Interventions (OT): activity tolerance training, manual therapy/joint mobilization, patient/caregiver education/training, adaptive equipment training, neuromuscular control/coordination retraining, BADL retraining, cognitive/visual perception retraining,  occupation/activity based interventions, ROM/therapeutic exercise, strengthening exercise, edema control/reduction, functional balance retraining, IADL retraining, passive ROM/stretching, transfer/mobility retraining  Therapy Frequency (OT): daily  Plan of Care Review  Plan of Care Reviewed With: patient  Outcome Evaluation: OT eval completed as co-eval with PT. Patient sitting in chair and agreeable. Min A x2 assist required for sit<>stand. Mod A x2 required for step/pivot to BSC using FWW. Pt required assistance lifting R toes off floor. Min A to manage gown at toilet and complete gualberto care. Pt required SBA to doff<>don R sock. Dependent to don<>doff L sock per clinical judgement. Pt would benefit from continued skilled IP/OT to address decreased strength, ax tolerance, and independence with ADLs, IADLs, and transfers. Recommended d/c rehab prior to home such as ARU.     Time Calculation:    Time Calculation- OT     Row Name 05/31/23 0906             Time Calculation- OT    OT Start Time 0906  -SA      OT Stop Time 0950  -SA      OT Time Calculation (min) 44 min  -SA      OT Received On 05/31/23  -SA      OT Goal Re-Cert Due Date 06/13/23  -SA         Untimed Charges    OT Eval/Re-eval Minutes 44  -SA         Total Minutes    Untimed Charges Total Minutes 44  -SA       Total Minutes 44  -SA            User Key  (r) = Recorded By, (t) = Taken By, (c) = Cosigned By    Initials Name Provider Type     Angelic Laboy OT Occupational Therapist              Therapy Charges for Today     Code Description Service Date Service Provider Modifiers Qty    [id number]  OT EVAL MOD COMPLEXITY 3 5/31/2023 Angelic Laboy OT GO 1               Angelic Laboy OT  5/31/2023

## 2023-06-01 LAB
ANION GAP SERPL CALCULATED.3IONS-SCNC: 10 MMOL/L (ref 5–15)
BASOPHILS # BLD AUTO: 0.06 10*3/MM3 (ref 0–0.2)
BASOPHILS NFR BLD AUTO: 0.7 % (ref 0–1.5)
BUN SERPL-MCNC: 17 MG/DL (ref 8–23)
BUN/CREAT SERPL: 23.3 (ref 7–25)
CALCIUM SPEC-SCNC: 8.8 MG/DL (ref 8.6–10.5)
CHLORIDE SERPL-SCNC: 95 MMOL/L (ref 98–107)
CO2 SERPL-SCNC: 24 MMOL/L (ref 22–29)
CREAT SERPL-MCNC: 0.73 MG/DL (ref 0.57–1)
DEPRECATED RDW RBC AUTO: 38.4 FL (ref 37–54)
EGFRCR SERPLBLD CKD-EPI 2021: 81.2 ML/MIN/1.73
EOSINOPHIL # BLD AUTO: 0.08 10*3/MM3 (ref 0–0.4)
EOSINOPHIL NFR BLD AUTO: 1 % (ref 0.3–6.2)
ERYTHROCYTE [DISTWIDTH] IN BLOOD BY AUTOMATED COUNT: 12.9 % (ref 12.3–15.4)
GLUCOSE BLDC GLUCOMTR-MCNC: 296 MG/DL (ref 70–130)
GLUCOSE BLDC GLUCOMTR-MCNC: 312 MG/DL (ref 70–130)
GLUCOSE BLDC GLUCOMTR-MCNC: 329 MG/DL (ref 70–130)
GLUCOSE BLDC GLUCOMTR-MCNC: 354 MG/DL (ref 70–130)
GLUCOSE SERPL-MCNC: 288 MG/DL (ref 65–99)
HCT VFR BLD AUTO: 26 % (ref 34–46.6)
HGB BLD-MCNC: 8.7 G/DL (ref 12–15.9)
IMM GRANULOCYTES # BLD AUTO: 0.03 10*3/MM3 (ref 0–0.05)
IMM GRANULOCYTES NFR BLD AUTO: 0.4 % (ref 0–0.5)
LYMPHOCYTES # BLD AUTO: 1.92 10*3/MM3 (ref 0.7–3.1)
LYMPHOCYTES NFR BLD AUTO: 23.7 % (ref 19.6–45.3)
MCH RBC QN AUTO: 27.4 PG (ref 26.6–33)
MCHC RBC AUTO-ENTMCNC: 33.5 G/DL (ref 31.5–35.7)
MCV RBC AUTO: 81.8 FL (ref 79–97)
MONOCYTES # BLD AUTO: 0.78 10*3/MM3 (ref 0.1–0.9)
MONOCYTES NFR BLD AUTO: 9.6 % (ref 5–12)
NEUTROPHILS NFR BLD AUTO: 5.22 10*3/MM3 (ref 1.7–7)
NEUTROPHILS NFR BLD AUTO: 64.6 % (ref 42.7–76)
NRBC BLD AUTO-RTO: 0 /100 WBC (ref 0–0.2)
PLATELET # BLD AUTO: 190 10*3/MM3 (ref 140–450)
PMV BLD AUTO: 10.8 FL (ref 6–12)
POTASSIUM SERPL-SCNC: 4.3 MMOL/L (ref 3.5–5.2)
RBC # BLD AUTO: 3.18 10*6/MM3 (ref 3.77–5.28)
SODIUM SERPL-SCNC: 129 MMOL/L (ref 136–145)
WBC NRBC COR # BLD: 8.09 10*3/MM3 (ref 3.4–10.8)

## 2023-06-01 PROCEDURE — 25010000002 HEPARIN (PORCINE) PER 1000 UNITS: Performed by: ORTHOPAEDIC SURGERY

## 2023-06-01 PROCEDURE — 80048 BASIC METABOLIC PNL TOTAL CA: CPT | Performed by: ORTHOPAEDIC SURGERY

## 2023-06-01 PROCEDURE — 97530 THERAPEUTIC ACTIVITIES: CPT

## 2023-06-01 PROCEDURE — 99024 POSTOP FOLLOW-UP VISIT: CPT | Performed by: ORTHOPAEDIC SURGERY

## 2023-06-01 PROCEDURE — 97116 GAIT TRAINING THERAPY: CPT

## 2023-06-01 PROCEDURE — 85025 COMPLETE CBC W/AUTO DIFF WBC: CPT | Performed by: ORTHOPAEDIC SURGERY

## 2023-06-01 PROCEDURE — 97110 THERAPEUTIC EXERCISES: CPT

## 2023-06-01 PROCEDURE — 63710000001 INSULIN ASPART PER 5 UNITS: Performed by: ORTHOPAEDIC SURGERY

## 2023-06-01 PROCEDURE — 82948 REAGENT STRIP/BLOOD GLUCOSE: CPT

## 2023-06-01 RX ADMIN — ACETAMINOPHEN 1000 MG: 500 TABLET, FILM COATED ORAL at 14:27

## 2023-06-01 RX ADMIN — ATORVASTATIN CALCIUM 20 MG: 20 TABLET, FILM COATED ORAL at 09:14

## 2023-06-01 RX ADMIN — HYDROCHLOROTHIAZIDE 12.5 MG: 12.5 TABLET ORAL at 09:14

## 2023-06-01 RX ADMIN — ATENOLOL 12.5 MG: 25 TABLET ORAL at 09:14

## 2023-06-01 RX ADMIN — ACETAMINOPHEN 1000 MG: 500 TABLET, FILM COATED ORAL at 01:03

## 2023-06-01 RX ADMIN — DOCUSATE SODIUM 50 MG AND SENNOSIDES 8.6 MG 2 TABLET: 8.6; 5 TABLET, FILM COATED ORAL at 20:27

## 2023-06-01 RX ADMIN — DOCUSATE SODIUM 50 MG AND SENNOSIDES 8.6 MG 2 TABLET: 8.6; 5 TABLET, FILM COATED ORAL at 09:14

## 2023-06-01 RX ADMIN — Medication 10 ML: at 20:27

## 2023-06-01 RX ADMIN — LISINOPRIL 2.5 MG: 2.5 TABLET ORAL at 09:14

## 2023-06-01 RX ADMIN — ACETAMINOPHEN 1000 MG: 500 TABLET, FILM COATED ORAL at 07:43

## 2023-06-01 RX ADMIN — INSULIN ASPART 7 UNITS: 100 INJECTION, SOLUTION INTRAVENOUS; SUBCUTANEOUS at 16:43

## 2023-06-01 RX ADMIN — PANTOPRAZOLE SODIUM 40 MG: 40 TABLET, DELAYED RELEASE ORAL at 06:08

## 2023-06-01 RX ADMIN — HEPARIN SODIUM 5000 UNITS: 5000 INJECTION INTRAVENOUS; SUBCUTANEOUS at 20:28

## 2023-06-01 RX ADMIN — HEPARIN SODIUM 5000 UNITS: 5000 INJECTION INTRAVENOUS; SUBCUTANEOUS at 14:28

## 2023-06-01 RX ADMIN — HEPARIN SODIUM 5000 UNITS: 5000 INJECTION INTRAVENOUS; SUBCUTANEOUS at 06:08

## 2023-06-01 RX ADMIN — INSULIN ASPART 7 UNITS: 100 INJECTION, SOLUTION INTRAVENOUS; SUBCUTANEOUS at 11:16

## 2023-06-01 RX ADMIN — FERROUS SULFATE TAB EC 324 MG (65 MG FE EQUIVALENT) 324 MG: 324 (65 FE) TABLET DELAYED RESPONSE at 09:15

## 2023-06-01 RX ADMIN — INSULIN ASPART 8 UNITS: 100 INJECTION, SOLUTION INTRAVENOUS; SUBCUTANEOUS at 07:44

## 2023-06-01 RX ADMIN — ASPIRIN 81 MG: 81 TABLET, CHEWABLE ORAL at 09:14

## 2023-06-01 NOTE — PROGRESS NOTES
[facility] Medicine   INPATIENT PROGRESS NOTE      Patient Name: Susanna Carr  Date of Admission: 5/29/2023  Today's Date: 06/01/23  Length of Stay: 2  Primary Care Physician: Troy Cheng MD    Subjective   Chief Complaint and HPI:  Patient admitted on 5/29/2023 with chief complaint of fall, mechanical fall with left hip pain.  Work-up shows left-sided intertrochanteric fracture.  Past medical history includes diabetes, hypertension.  On baby aspirin only at home.    Daily assessment 6/1/2023.  The patient is currently sitting up in her bed eating breakfast on evaluation.  She is awake and alert.  Review of chart shows that she is making good improvement with physical therapy.  Should be able to be discharged to home with home health in the a.m.  Dr. Monae has seen the patient and stated the patient could discharge to home with home health probably 24 to 48 hours.  Once discharged the patient will be restarted on Eliquis.  Vital signs are stable patient is afebrile patient denies any uncontrolled pain otherwise no new issues or complaints.      Review of Systems   Constitutional: Positive for fatigue. Negative for chills and fever.   HENT: Negative.  Negative for congestion.    Eyes: Negative.    Respiratory: Negative.  Negative for cough and shortness of breath.    Cardiovascular: Negative.  Negative for chest pain and palpitations.   Gastrointestinal: Negative.  Negative for abdominal distention, abdominal pain, nausea and vomiting.   Endocrine: Negative.    Genitourinary: Negative.    Musculoskeletal: Negative.         Left hip pain controlled with medication   Skin: Negative.    Neurological: Positive for weakness. Negative for dizziness.   Hematological: Negative.    Psychiatric/Behavioral: Negative.       Review of Systems as of 06/01/23   All pertinent negatives and positives are as above. All other systems have been reviewed and are  negative unless otherwise stated.     Objective    Temp:  [98.1 °F (36.7 °C)-98.7 °F (37.1 °C)] 98.1 °F (36.7 °C)  Heart Rate:  [] 108  Resp:  [16-18] 18  BP: (114-146)/(55-74) 124/58  Physical Exam  Vitals and nursing note reviewed.   Constitutional:       Appearance: Normal appearance.   HENT:      Head: Normocephalic and atraumatic.      Right Ear: External ear normal.      Left Ear: External ear normal.      Nose: Nose normal.      Mouth/Throat:      Mouth: Mucous membranes are moist.      Pharynx: Oropharynx is clear.   Eyes:      General: No scleral icterus.     Extraocular Movements: Extraocular movements intact.      Conjunctiva/sclera: Conjunctivae normal.      Pupils: Pupils are equal, round, and reactive to light.   Cardiovascular:      Rate and Rhythm: Normal rate and regular rhythm.      Heart sounds: No murmur heard.  Pulmonary:      Effort: Pulmonary effort is normal.      Breath sounds: Normal breath sounds.   Abdominal:      General: Bowel sounds are normal.      Palpations: Abdomen is soft.   Musculoskeletal:         General: Normal range of motion.      Cervical back: Normal range of motion and neck supple.      Comments: Wound stable.  Left hip   Skin:     General: Skin is warm and dry.      Capillary Refill: Capillary refill takes less than 2 seconds.   Neurological:      General: No focal deficit present.      Mental Status: She is alert and oriented to person, place, and time.      Motor: Weakness present.   Psychiatric:         Mood and Affect: Mood normal.         Behavior: Behavior normal.         Vitals and nursing note reviewed.   Constitutional:       General: No acute distress.     Appearance: Normal appearance.   HENT:      Head: Normocephalic and atraumatic.      Mouth: Mucous membranes are moist.   Eyes:      Conjunctivae/sclerae: Conjunctivae normal.      Pupils: Pupils are equal, round, and reactive to light.   Cardiovascular:      Rate and Rhythm: Normal rate and regular  rhythm.   Pulmonary:      Effort: Pulmonary effort is normal.      Breath sounds: Normal breath sounds.   Abdominal:      General: Abdomen is flat.      Palpations: Abdomen is soft.      Tenderness: No abdominal tenderness.   Genitourinary:     General:   normal  Musculoskeletal:    Tenderness to palpation of the left-sided hip     General: No signs of injury. Normal range of motion.   Skin:     General: Skin is warm and dry.   Neurological:      General: No focal deficit present.      Mental Status: Alert and oriented  Psychiatric:         Mood and Affect: Mood normal.         Behavior: Behavior normal.         Results Review:  I have reviewed the labs, radiology results, and diagnostic studies.    Laboratory Data:   Results from last 7 days   Lab Units 06/01/23  0615 05/31/23  0551 05/30/23  1534 05/30/23  0541   WBC 10*3/mm3 8.09 10.84*  --  9.30   HEMOGLOBIN g/dL 8.7* 9.5* 11.2* 11.3*   HEMATOCRIT % 26.0* 27.6* 33.1* 33.6*   PLATELETS 10*3/mm3 190 198  --  242        Results from last 7 days   Lab Units 06/01/23  0615 05/31/23  0551 05/30/23  0541 05/29/23  1818   SODIUM mmol/L 129* 130* 131* 134*   POTASSIUM mmol/L 4.3 3.9 3.6 3.5   CHLORIDE mmol/L 95* 94* 94* 94*   CO2 mmol/L 24.0 21.0* 25.0 22.0   BUN mg/dL 17 12 14 16   CREATININE mg/dL 0.73 0.68 0.59 0.65   CALCIUM mg/dL 8.8 8.5* 9.4 9.9   BILIRUBIN mg/dL  --   --   --  0.2   ALK PHOS U/L  --   --   --  68   ALT (SGPT) U/L  --   --   --  15   AST (SGOT) U/L  --   --   --  19   GLUCOSE mg/dL 288* 297* 154* 199*       Culture Data:   No results found for: BLOODCX  No results found for: URINECX  No results found for: RESPCX  No results found for: WOUNDCX  No results found for: STOOLCX  No components found for: BODYFLD    Radiology Data:   Imaging Results (Last 24 Hours)     ** No results found for the last 24 hours. **          I have reviewed the patient's current medications.     Assessment/Plan     I have utilized all available immediate resources to  obtain, update, or review the patient's current medications (including all prescriptions, over-the-counter products, herbals, cannabis/cannabidiol products, and vitamin/mineral/dietary (nutritional) supplements).      Active Hospital Problems    Diagnosis    • **Traumatic closed intertrochanteric fracture of left femur with minimal displacement, initial encounter    • Fall at home, initial encounter    • Closed fracture of left hip, initial encounter    • Primary hypertension    • Controlled type 2 diabetes mellitus without complication, without long-term current use of insulin        Medical Decision Making  Number and Complexity of problems: As below    Conditions and Status:        Condition is improving.  Condition is at treatment goal.     [facility] Data  External documents reviewed: None today  My EKG interpretation: None today  My plain film interpretation: None today  Tests considered but not ordered: None today       Discussed with: Patient, attending      Treatment Plan  As below    Care Planning  Shared decision making: With patient  Code status and discussions: Full code, CPR    Disposition  Social Determinants of Health that impact treatment or disposition:   I expect the patient to be discharged to home versus rehab to home in 2-3 days.      I confirmed that the patient's Advance Care Plan is present, code status is documented, or surrogate decision maker is listed in the patient's medical record.   ________________________________      Left Hip fracture              -Orthopedics consulting; status post left hip repair 5/30/2021.              -Pain and nausea control              -PT and OT after surgery; lives alone.  Should be able to go home with home health.  Doing well.  Patient does have good family support outside of the hospital.  Anticipate discharge to home with home health in the a.m.                  DM              -Consistent carbohydrate diet, Acchecks with sliding scale insulin                Continue to monitor    HTN              -Continue atenolol, lisinopril PRN hydralazine   Current blood pressure is 124/58 and O2 saturation is 93% on room air.    Hyperlipidemia  Continue atorvastatin        VTE PPx: SCDs  CODE STATUS full code    Copied text in this note has been reviewed and is accurate as of 06/01/23       Electronically signed by Jon Sneed DO, 06/01/23, 12:01 CDT.

## 2023-06-01 NOTE — THERAPY TREATMENT NOTE
Acute Care - Physical Therapy Treatment Note  [facility]     Patient Name: Susanna Carr  : 1940  MRN: [id number]  Today's Date: 2023      Visit Dx:     ICD-10-CM ICD-9-CM   1. Closed fracture of left hip, initial encounter  S72.002A 820.8   2. Impaired mobility and ADLs  Z74.09 V49.89    Z78.9    3. Impaired functional mobility, balance, gait, and endurance  Z74.09 V49.89     Patient Active Problem List   Diagnosis   • Closed fracture of ramus of left pubis   • Pathological fracture of vertebra, initial encounter   • Closed T12 spinal fracture   • Closed fracture of second lumbar vertebra   • Primary hypertension   • Controlled type 2 diabetes mellitus without complication, without long-term current use of insulin   • Osteoporosis, senile   • Traumatic closed intertrochanteric fracture of left femur with minimal displacement, initial encounter   • Fall at home, initial encounter   • Closed fracture of left hip, initial encounter     Past Medical History:   Diagnosis Date   • Cataract    • Diabetes mellitus    • Hyperlipidemia    • Hypertension    • Irregular heart beat    • Osteoporosis    • Skin cancer    • Wears dentures     upper and lower     Past Surgical History:   Procedure Laterality Date   • CATARACT EXTRACTION W/ INTRAOCULAR LENS IMPLANT Left 2020    Procedure: REMOVE CATARACT AND IMPLANT  INTRAOCULAR LENS;  Surgeon: Juan Tineo MD;  Location: [facility];  Service: Ophthalmology;  Laterality: Left;   • CATARACT EXTRACTION W/ INTRAOCULAR LENS IMPLANT Right 10/2/2020    Procedure: REMOVE CATARACT AND IMPLANT INTRAOCULAR LENS;  Surgeon: Juan Tineo MD;  Location: [facility];  Service: Ophthalmology;  Laterality: Right;   • CHOLECYSTECTOMY     • ORIF ANKLE FRACTURE Left      PT Assessment (last 12 hours)     PT Evaluation and Treatment     Row Name 23 0905          Physical Therapy Time and Intention    Subjective Information complains of;pain  -LN      Document Type therapy note (daily note)  -LN     Mode of Treatment physical therapy  -LN     Session Not Performed other (see comments)  -     Row Name 06/01/23 0905          General Information    Patient Profile Reviewed yes  -LN     Existing Precautions/Restrictions fall  -LN     Row Name 06/01/23 0905          Home Use of Assistive/Adaptive Equipment    Equipment Currently Used at Home cane, quad;walker, rolling  -LN     Row Name 06/01/23 0905          Pain    Pretreatment Pain Rating 2/10  -LN     Posttreatment Pain Rating 2/10  -LN     Pain Location - Side/Orientation Left  -LN     Pain Location - hip  -LN     Pain Intervention(s) --  pre med  -LN     Row Name 06/01/23 0905          Cognition    Orientation Status (Cognition) oriented x 4  -LN     [facility] Name 06/01/23 0905          Range of Motion Comprehensive    General Range of Motion bilateral upper extremity ROM WFL  -[facility] Name 06/01/23 0905          Strength Comprehensive (MMT)    General Manual Muscle Testing (MMT) Assessment other (see comments)  -     Row Name 06/01/23 0905          Mobility    Extremity Weight-bearing Status left lower extremity  -LN     Left Lower Extremity (Weight-bearing Status) weight-bearing as tolerated (WBAT)  -     Row Name 06/01/23 0905          Bed Mobility    Bed Mobility bed mobility (all) activities  -     All Activities, [place] (Bed Mobility) --  -     Row Name 06/01/23 0905          Transfers    Transfers sit-stand transfer;stand-sit transfer;toilet transfer  -[facility] Name 06/01/23 0905          Sit-Stand Transfer    Sit-Stand [place] (Transfers) minimum assist (75% patient effort)  -LN     Assistive Device (Sit-Stand Transfers) walker, front-wheeled  -LN     Row Name 06/01/23 0905          Stand-Sit Transfer    Stand-Sit [place] (Transfers) minimum assist (75% patient effort);verbal cues  -     Assistive Device (Stand-Sit Transfers) walker, front-wheeled  -LN     Row Name 06/01/23  0905          Toilet Transfer    Type (Toilet Transfer) sit-stand;stand-sit;stand pivot/stand step  -LN     [place] Level (Toilet Transfer) minimum assist (75% patient effort);1 person assist  -LN     Assistive Device (Toilet Transfer) commode, bedside without drop arms;walker, front-wheeled  -LN     Row Name 06/01/23 0905          Gait/Stairs (Locomotion)    [place] Level (Gait) minimum assist (75% patient effort);2 person assist  -LN     Assistive Device (Gait) walker, front-wheeled  -LN     Distance in Feet (Gait) 3',16'x2  -LN     Deviations/Abnormal Patterns (Gait) antalgic;left sided deviations;gait speed decreased;stride length decreased  -     Row Name 06/01/23 0905          Safety Issues, Functional Mobility    Impairments Affecting Function (Mobility) strength;endurance/activity tolerance;pain;balance  -     Row Name 06/01/23 0905          Motor Skills    Therapeutic Exercise hip;knee;ankle  -     Row Name 06/01/23 0905          Hip (Therapeutic Exercise)    Hip (Therapeutic Exercise) isometric exercises  -LN     Hip Isometrics (Therapeutic Exercise) gluteal sets  -     Row Name 06/01/23 0905          Knee (Therapeutic Exercise)    Knee (Therapeutic Exercise) AROM (active range of motion)  -LN     Knee AROM (Therapeutic Exercise) bilateral;LAQ (long arc quad)  -     Row Name 06/01/23 0905          Ankle (Therapeutic Exercise)    Ankle (Therapeutic Exercise) AROM (active range of motion)  -LN     Ankle AROM (Therapeutic Exercise) bilateral;dorsiflexion;plantarflexion  -LN     Row Name 06/01/23 0905          Respiratory WDL    Respiratory WDL breath sounds  -LN     Row Name 06/01/23 0905          Breath Sounds    Breath Sounds All Fields  -LN     All Lung Fields Breath Sounds equal bilaterally;clear  -LN     Row Name 06/01/23 0905          Skin WDL    Skin WDL X  -LN     Skin Temperature warm  -LN     Skin Moisture other (see comments)  -LN     Skin Elasticity firm  -LN     Skin Integrity  intact;other (see comments)  callus on feet,left hip incision  -LN     Row Name 06/01/23 0905          Wound 05/30/23 1446 Left hip Incision    Wound - Properties Group Placement Date: 05/30/23 -TW Placement Time: 1446 -TW Side: Left  -TW Location: hip  -TW Primary Wound Type: Incision  -TW    Closure Approximated;Staples  -LN     Drainage Characteristics/Odor --  red  -LN     Drainage Amount none  none observed  -LN     Retired Wound - Properties Group Placement Date: 05/30/23 -TW Placement Time: 1446 -TW Side: Left  -TW Location: hip  -TW Primary Wound Type: Incision  -TW    Retired Wound - Properties Group Date first assessed: 05/30/23 -TW Time first assessed: 1446 -TW Side: Left  -TW Location: hip  -TW Primary Wound Type: Incision  -TW    Row Name 06/01/23 0905          Coping    Observed Emotional State calm;cooperative  -LN     Verbalized Emotional State acceptance  -LN     Family/Support Persons family  -LN     Involvement in Care not present at bedside  -LN     Row Name 06/01/23 0905          Plan of Care Review    Plan of Care Reviewed With patient  -LN     Outcome Evaluation sit-stand-sit min of 1 and vc's for hand placement,amb 16'x2 with rw and min of 1 and difficulty advancing l le at times;vc's for walker/le sequencing  -LN     Row Name 06/01/23 0905          Vital Signs    Pre Systolic BP Rehab 140  -LN     Pre Treatment Diastolic BP 74  -LN     Post Systolic BP Rehab 154  -LN     Post Treatment Diastolic BP 72  -LN     Pretreatment Heart Rate (beats/min) 109  -LN     Posttreatment Heart Rate (beats/min) 112  -LN     Pre SpO2 (%) 98  -LN     O2 Delivery Pre Treatment room air  -LN     Post SpO2 (%) 98  -LN     Pre Patient Position Sitting  -LN     Intra Patient Position Standing  -LN     Post Patient Position Sitting  -LN     Row Name 06/01/23 0905          Positioning and Restraints    In Chair reclined;call light within reach;encouraged to call for assist;exit alarm on;legs  elevated;compression device  -LN     Row Name 06/01/23 0905          Therapy Assessment/Plan (PT)    Rehab Potential (PT) good, to achieve stated therapy goals  -LN     Criteria for Skilled Interventions Met (PT) yes;skilled treatment is necessary  -LN     Therapy Frequency (PT) daily  -LN     Row Name 06/01/23 0905          Bed Mobility Goal 1 (PT)    Activity/Assistive Device (Bed Mobility Goal 1, PT) sit to supine/supine to sit  -LN     [place] Level/Cues Needed (Bed Mobility Goal 1, PT) independent  -LN     Time Frame (Bed Mobility Goal 1, PT) by discharge  -LN     Strategies/Barriers (Bed Mobility Goal 1, PT) L hip fracture, ORIF, WBAT.  -LN     Progress/Outcomes (Bed Mobility Goal 1, PT) goal not met  -LN     Row Name 06/01/23 0905          Transfer Goal 1 (PT)    Activity/Assistive Device (Transfer Goal 1, PT) sit-to-stand/stand-to-sit;bed-to-chair/chair-to-bed;walker, rolling  -LN     [place] Level/Cues Needed (Transfer Goal 1, PT) modified independence  -LN     Time Frame (Transfer Goal 1, PT) by discharge  -LN     Strategies/Barriers (Transfers Goal 1, PT) L hip fracture, ORIF, WBAT.  -LN     Progress/Outcome (Transfer Goal 1, PT) goal not met  -LN     Row Name 06/01/23 0905          Gait Training Goal 1 (PT)    Activity/Assistive Device (Gait Training Goal 1, PT) walker, rolling  -LN     [place] Level (Gait Training Goal 1, PT) modified independence  -LN     Distance (Gait Training Goal 1, PT) 75' x 2.  -LN     Time Frame (Gait Training Goal 1, PT) by discharge  -LN     Strategies/Barriers (Gait Training Goal 1, PT) L hip fracture, ORIF, WBAT.  -LN     Progress/Outcome (Gait Training Goal 1, PT) goal not met  -LN           User Key  (r) = Recorded By, (t) = Taken By, (c) = Cosigned By    Initials Name Provider Type    LN Mary Petit PTA Physical Therapist Assistant    Adrienne Rao RN Registered Nurse                Physical Therapy Education     Title: PT OT SLP Therapies (In  Progress)     Topic: Physical Therapy (In Progress)     Point: Mobility training (In Progress)     Learning Progress Summary           Patient Acceptance, E, NR by ARPIT at 5/31/2023 0902    Comment: PT POC, hand placement with transfers, use of walker, posture with gait, rehab prior to discharge home.                   Point: Home exercise program (Not Started)     Learner Progress:  Not documented in this visit.          Point: Body mechanics (Not Started)     Learner Progress:  Not documented in this visit.          Point: Precautions (Not Started)     Learner Progress:  Not documented in this visit.                      User Key     Initials Effective Dates Name Provider Type Discipline     09/18/22 -  Alexey Gamez, PT Physical Therapist PT              PT Recommendation and Plan  Anticipated Discharge Disposition (PT): skilled nursing facility, inpatient rehabilitation facility  Therapy Frequency (PT): daily  Plan of Care Reviewed With: patient  Outcome Evaluation: sit-stand-sit min of 1 and vc's for hand placement,amb 16'x2 with rw and min of 1 and difficulty advancing l le at times;vc's for walker/le sequencing   Outcome Measures     Row Name 06/01/23 0905             How much help from another person do you currently need...    Turning from your back to your side while in flat bed without using bedrails? 3  -LN      Moving from lying on back to sitting on the side of a flat bed without bedrails? 3  -LN      Moving to and from a bed to a chair (including a wheelchair)? 3  -LN      Standing up from a chair using your arms (e.g., wheelchair, bedside chair)? 3  -LN      Climbing 3-5 steps with a railing? 2  -LN      To walk in hospital room? 3  -LN      AM-PAC 6 Clicks Score (PT) 17  -LN         Functional Assessment    Outcome Measure Options AM-PAC 6 Clicks Basic Mobility (PT)  -LN            User Key  (r) = Recorded By, (t) = Taken By, (c) = Cosigned By    Initials Name Provider Type    LN Mary Petit,  LILLIAN Physical Therapist Assistant                 Time Calculation:    PT Charges     Row Name 06/01/23 0956             Time Calculation    Start Time 0905  -LN      Stop Time 0953  -LN      Time Calculation (min) 48 min  -LN      PT Received On 06/01/23  -LN         Time Calculation- PT    Total Timed Code Minutes- PT 48 minute(s)  -LN            User Key  (r) = Recorded By, (t) = Taken By, (c) = Cosigned By    Initials Name Provider Type    LN Mary Petit PTA Physical Therapist Assistant              Therapy Charges for Today     Code Description Service Date Service Provider Modifiers Qty    09558121487 HC GAIT TRAINING EA 15 MIN 6/1/2023 Mary Petit, PTA GP 1    63984794053 HC PT THERAPEUTIC ACT EA 15 MIN 6/1/2023 Mary Petit, PTA GP 1    98415024640 HC PT THER PROC EA 15 MIN 6/1/2023 Mary Petit, PTA GP 1          PT G-Codes  Outcome Measure Options: AM-PAC 6 Clicks Basic Mobility (PT)  AM-PAC 6 Clicks Score (PT): 17  AM-PAC 6 Clicks Score (OT): 18    Mary Petit PTA  6/1/2023

## 2023-06-01 NOTE — PLAN OF CARE
Goal Outcome Evaluation:  Plan of Care Reviewed With: patient        Progress: improving   Slowly improving.Reports pain with activity.Tolerable with oral pain medication.Medicated at bedtime. Confused to place at times but reoriented well.LLE turns in slightly- rolled towel place to help alignment.Rested well off and on.

## 2023-06-01 NOTE — PLAN OF CARE
Goal Outcome Evaluation:  Plan of Care Reviewed With: patient           Outcome Evaluation: sit-stand-sit min of 1 and vc's for hand placement,amb 16'x2 with rw and min of 1 and difficulty advancing l le at times;vc's for walker/le sequencing

## 2023-06-01 NOTE — PROGRESS NOTES
ORTHOPEDIC PROGRESS NOTE:    Name:  Susanna Carr  Date:    2023  Date of admission:  2023    Post op day:  2 Days Post-Op  Procedure:    Procedure(s) (LRB):  LEFT HIP INTERTROCHANTERIC NAILING (Left)    Subjective:  Pain is improving  No fever or chills.  No new complaints.    Vitals:     Vitals:    23 0315   BP: 114/55   Pulse: 107   Resp: 16   Temp: 98.2 °F (36.8 °C)   SpO2: 91%      Temp (24hrs), Av.5 °F (36.9 °C), Min:98.2 °F (36.8 °C), Max:98.7 °F (37.1 °C)      Exam:  Awake and alert  Toes up and down  Incision clean and dry without any erythema  Calves soft and nontender.  Good distal pulses and sensation.    ASSESSMENT:  Active Hospital Problems    Diagnosis  POA   • **Traumatic closed intertrochanteric fracture of left femur with minimal displacement, initial encounter [S72.142A]  Yes   • Fall at home, initial encounter [W19.XXXA, Y92.009]  Not Applicable   • Closed fracture of left hip, initial encounter [S72.002A]  Yes   • Primary hypertension [I10]  Yes   • Controlled type 2 diabetes mellitus without complication, without long-term current use of insulin [E11.9]  Yes         PLAN:    S/p IM shira left hip POD 2  Mobilize with PT/OT              WBAT              ROM as tolerated.              No true restrictions  DVT prophylaxis: subQ heparin until disposition determined              IF Home - will switch to eliquis              IF ARU/SNF - lovenox probably best option  Anticipate discharge 1-2 days.    23 at 07:20 CDT by Dominick Monae MD

## 2023-06-01 NOTE — PLAN OF CARE
Goal Outcome Evaluation:  Plan of Care Reviewed With: patient        Progress: improving  Outcome Evaluation: pt sitting in recliner when GARBER arrived. Pt reporting she has been to/from [facility] x4 times today due to previously having difficulty with BM. Pt completed B UE strengthening ther ex in all planes, 20 x 2 sets with PRN RBs needed due to faitgue. Pt completed sit >< stand t/f with Min A and performed fxnl mobility ~6-7 steps using RW with Darren demo difficulty advancing LEs with pt reporting she felt like her legs were going to give out. Pt edu on safety and EC. Pts BP elevated with activity due to pain with movement. Pt having cold chills/hot sweat today. Pt would benefit from inpatient rehab. Pt in chair with all needs met and no s/s of distress.

## 2023-06-01 NOTE — THERAPY TREATMENT NOTE
Patient Name: Susanna Carr  : 1940    MRN: [id number]                              Today's Date: 2023       Admit Date: 2023    Visit Dx:     ICD-10-CM ICD-9-CM   1. Closed fracture of left hip, initial encounter  S72.002A 820.8   2. Impaired mobility and ADLs  Z74.09 V49.89    Z78.9    3. Impaired functional mobility, balance, gait, and endurance  Z74.09 V49.89     Patient Active Problem List   Diagnosis   • Closed fracture of ramus of left pubis   • Pathological fracture of vertebra, initial encounter   • Closed T12 spinal fracture   • Closed fracture of second lumbar vertebra   • Primary hypertension   • Controlled type 2 diabetes mellitus without complication, without long-term current use of insulin   • Osteoporosis, senile   • Traumatic closed intertrochanteric fracture of left femur with minimal displacement, initial encounter   • Fall at home, initial encounter   • Closed fracture of left hip, initial encounter     Past Medical History:   Diagnosis Date   • Cataract    • Diabetes mellitus    • Hyperlipidemia    • Hypertension    • Irregular heart beat    • Osteoporosis    • Skin cancer    • Wears dentures     upper and lower     Past Surgical History:   Procedure Laterality Date   • CATARACT EXTRACTION W/ INTRAOCULAR LENS IMPLANT Left 2020    Procedure: REMOVE CATARACT AND IMPLANT  INTRAOCULAR LENS;  Surgeon: Juan Tineo MD;  Location: [facility];  Service: Ophthalmology;  Laterality: Left;   • CATARACT EXTRACTION W/ INTRAOCULAR LENS IMPLANT Right 10/2/2020    Procedure: REMOVE CATARACT AND IMPLANT INTRAOCULAR LENS;  Surgeon: Juan Tineo MD;  Location: [facility];  Service: Ophthalmology;  Laterality: Right;   • CHOLECYSTECTOMY     • ORIF ANKLE FRACTURE Left       General Information     Row Name 23 1331          OT Time and Intention    Document Type therapy note (daily note)  -CS     Mode of Treatment occupational therapy  -CS     Row Name 23  1331          General Information    Patient Profile Reviewed yes  -CS     Existing Precautions/Restrictions fall  -CS     Row Name 06/01/23 1331          Cognition    Orientation Status (Cognition) oriented x 4  -CS     Row Name 06/01/23 1331          Safety Issues, Functional Mobility    Impairments Affecting Function (Mobility) strength;endurance/activity tolerance;pain;balance  -CS           User Key  (r) = Recorded By, (t) = Taken By, (c) = Cosigned By    Initials Name Provider Type    CS Valeria Castro COTA Occupational Therapist Assistant                 Mobility/ADL's     Row Name 06/01/23 1331          Transfers    Transfers sit-stand transfer;stand-sit transfer  -     Row Name 06/01/23 1331          Sit-Stand Transfer    Sit-Stand [place] (Transfers) minimum assist (75% patient effort)  -CS     Assistive Device (Sit-Stand Transfers) walker, front-wheeled  -     Row Name 06/01/23 1331          Stand-Sit Transfer    Stand-Sit [place] (Transfers) minimum assist (75% patient effort);verbal cues  -     Assistive Device (Stand-Sit Transfers) walker, front-wheeled  -     Row Name 06/01/23 1331          Functional Mobility    Functional Mobility- Ind. Level minimum assist (75% patient effort)  -     Functional Mobility- Device walker, front-wheeled  -CS     Functional Mobility-Distance (Feet) 2  -     Row Name 06/01/23 1331          Mobility    Extremity Weight-bearing Status left lower extremity  -CS     Left Lower Extremity (Weight-bearing Status) weight-bearing as tolerated (WBAT)  -CS           User Key  (r) = Recorded By, (t) = Taken By, (c) = Cosigned By    Initials Name Provider Type    Valeria Phelps COTA Occupational Therapist Assistant               Obj/Interventions     Row Name 06/01/23 1331          Shoulder (Therapeutic Exercise)    Shoulder (Therapeutic Exercise) strengthening exercise  -     Shoulder Strengthening (Therapeutic Exercise)  bilateral;flexion;aBduction;aDduction;resisted diagonal exercise;sitting;2 lb free weight;2 sets;20 repititions  -     Row Name 06/01/23 1331          Elbow/Forearm (Therapeutic Exercise)    Elbow/Forearm (Therapeutic Exercise) strengthening exercise  -     Elbow/Forearm Strengthening (Therapeutic Exercise) bilateral;flexion;extension;supination;pronation;sitting;2 lb free weight;20 repititions;2 sets  -     Row Name 06/01/23 1331          Motor Skills    Therapeutic Exercise shoulder;elbow/forearm  -           User Key  (r) = Recorded By, (t) = Taken By, (c) = Cosigned By    Initials Name Provider Type    CS Valeria Castro COTA Occupational Therapist Assistant               Goals/Plan     Row Name 06/01/23 1331          Transfer Goal 1 (OT)    Activity/Assistive Device (Transfer Goal 1, OT) bed-to-chair/chair-to-bed;commode, bedside without drop arms  -CS     [place] Level/Cues Needed (Transfer Goal 1, OT) standby assist  -CS     Time Frame (Transfer Goal 1, OT) long term goal (LTG);by discharge  -CS     Progress/Outcome (Transfer Goal 1, OT) goal not met  -     Row Name 06/01/23 1331          Bathing Goal 1 (OT)    Activity/Device (Bathing Goal 1, OT) lower body bathing  -CS     [place] Level/Cues Needed (Bathing Goal 1, OT) modified independence  -CS     Time Frame (Bathing Goal 1, OT) long term goal (LTG);by discharge  -CS     Progress/Outcomes (Bathing Goal 1, OT) goal not met  -     Row Name 06/01/23 1331          Dressing Goal 1 (OT)    Activity/Device (Dressing Goal 1, OT) lower body dressing  -CS     [place]/Cues Needed (Dressing Goal 1, OT) modified independence  -CS     Time Frame (Dressing Goal 1, OT) long term goal (LTG);by discharge  -CS     Progress/Outcome (Dressing Goal 1, OT) goal not met  -     Row Name 06/01/23 1331          Toileting Goal 1 (OT)    Activity/Device (Toileting Goal 1, OT) toileting skills, all  -CS     [place] Level/Cues Needed (Toileting Goal  1, OT) independent  -CS     Time Frame (Toileting Goal 1, OT) long term goal (LTG);by discharge  -CS     Progress/Outcome (Toileting Goal 1, OT) goal not met  -CS           User Key  (r) = Recorded By, (t) = Taken By, (c) = Cosigned By    Initials Name Provider Type    CS Valeria Castro COTA Occupational Therapist Assistant               Clinical Impression     Row Name 06/01/23 1331          Pain Assessment    Pretreatment Pain Rating 2/10  -CS     Posttreatment Pain Rating 4/10  -CS     Pain Location - Side/Orientation Left  -CS     Pain Location - hip  -CS     Row Name 06/01/23 1331          Plan of Care Review    Plan of Care Reviewed With patient  -CS     Progress improving  -CS     Outcome Evaluation pt sitting in recliner when GARBER arrived. Pt reporting she has been to/from [facility] x4 times today due to previously having difficulty with BM. Pt completed B UE strengthening ther ex in all planes, 20 x 2 sets with PRN RBs needed due to faitgue. Pt completed sit >< stand t/f with Min A and performed fxnl mobility ~6-7 steps using RW with Darren demo difficulty advancing LEs with pt reporting she felt like her legs were going to give out. Pt edu on safety and EC. Pts BP elevated with activity due to pain with movement. Pt having cold chills/hot sweat today. Pt would benefit from inpatient rehab. Pt in chair with all needs met and no s/s of distress.  -CS     Row Name 06/01/23 1331          Therapy Assessment/Plan (OT)    Rehab Potential (OT) good, to achieve stated therapy goals  -CS     Criteria for Skilled Therapeutic Interventions Met (OT) yes;meets criteria;skilled treatment is necessary  -CS     Therapy Frequency (OT) daily  -CS     Row Name 06/01/23 1331          Therapy Plan Review/Discharge Plan (OT)    Anticipated Discharge Disposition (OT) inpatient rehabilitation facility  -CS     Row Name 06/01/23 1331          Vital Signs    Pre Systolic BP Rehab 127  -CS     Pre Treatment Diastolic BP 58  -CS     Post  Systolic BP Rehab 169  -CS     Post Treatment Diastolic BP 65  -CS     Pretreatment Heart Rate (beats/min) 92  -CS     Pre Patient Position Sitting  -CS     Intra Patient Position Standing  -CS     Post Patient Position Sitting  -CS     Row Name 06/01/23 1331          Positioning and Restraints    Pre-Treatment Position sitting in chair/recliner  -CS     Post Treatment Position chair  -CS           User Key  (r) = Recorded By, (t) = Taken By, (c) = Cosigned By    Initials Name Provider Type    CS Valeria Castro COTA Occupational Therapist Assistant               Outcome Measures     Row Name 06/01/23 1331          How much help from another is currently needed...    Putting on and taking off regular lower body clothing? 2  -CS     Bathing (including washing, rinsing, and drying) 2  -CS     Toileting (which includes using toilet bed pan or urinal) 3  -CS     Putting on and taking off regular upper body clothing 3  -CS     Taking care of personal grooming (such as brushing teeth) 4  -CS     Eating meals 4  -CS     AM-PAC 6 Clicks Score (OT) 18  -CS     Row Name 06/01/23 0905 06/01/23 0735       How much help from another person do you currently need...    Turning from your back to your side while in flat bed without using bedrails? 3  -LN 3  -TH    Moving from lying on back to sitting on the side of a flat bed without bedrails? 3  -LN 3  -TH    Moving to and from a bed to a chair (including a wheelchair)? 3  -LN 3  -TH    Standing up from a chair using your arms (e.g., wheelchair, bedside chair)? 3  -LN 3  -TH    Climbing 3-5 steps with a railing? 2  -LN 2  -TH    To walk in hospital room? 3  -LN 3  -TH    AM-PAC 6 Clicks Score (PT) 17  -LN 17  -TH    Highest level of mobility 5 --> Static standing  -LN 5 --> Static standing  -TH    Row Name 06/01/23 0905          Functional Assessment    Outcome Measure Options AM-PAC 6 Clicks Basic Mobility (PT)  -LN           User Key  (r) = Recorded By, (t) = Taken By, (c) =  Cosigned By    Initials Name Provider Type    LN Mary Petit, PTA Physical Therapist Assistant    Valeria Phelps COTA Occupational Therapist Assistant    Celeste Jones, RN Registered Nurse                Occupational Therapy Education     Title: PT OT SLP Therapies (In Progress)     Topic: Occupational Therapy (In Progress)     Point: ADL training (Done)     Description:   Instruct learner(s) on proper safety adaptation and remediation techniques during self care or transfers.   Instruct in proper use of assistive devices.              Learning Progress Summary           Patient Acceptance, E,TB, VU by  at 5/31/2023 0949    Comment: OT POC, Role of OT, transfer training                   Point: Home exercise program (Not Started)     Description:   Instruct learner(s) on appropriate technique for monitoring, assisting and/or progressing therapeutic exercises/activities.              Learner Progress:  Not documented in this visit.          Point: Precautions (Done)     Description:   Instruct learner(s) on prescribed precautions during self-care and functional transfers.              Learning Progress Summary           Patient Acceptance, E,TB, VU by  at 5/31/2023 0949    Comment: OT POC, Role of OT, transfer training                   Point: Body mechanics (Done)     Description:   Instruct learner(s) on proper positioning and spine alignment during self-care, functional mobility activities and/or exercises.              Learning Progress Summary           Patient Acceptance, E,TB, VU by  at 5/31/2023 0949    Comment: OT POC, Role of OT, transfer training                               User Key     Initials Effective Dates Name Provider Type [facility] 08/11/22 -  Angelic Laboy OT Occupational Therapist OT              OT Recommendation and Plan  Therapy Frequency (OT): daily  Plan of Care Review  Plan of Care Reviewed With: patient  Progress: improving  Outcome Evaluation: pt sitting in  recliner when GARBER arrived. Pt reporting [place] [facility] [place] due to previously having difficulty with BM. Pt completed B UE strengthening ther ex in all planes, 20 x 2 sets with PRN RBs needed due to faitgue. Pt completed sit >< stand t/f with Min A and performed fxnl mobility ~6-7 steps using RW with Darren demo difficulty advancing LEs with pt reporting she felt like her legs were going to give out. Pt edu on safety and EC. Pts BP elevated with activity due to pain with movement. Pt having cold chills/hot sweat today. Pt would benefit from inpatient rehab. Pt in chair with all needs met and no s/s of distress.     Time Calculation:    Time Calculation- OT     Row Name 06/01/23 1331             Time Calculation- OT    OT Start Time 1335  -CS      OT Stop Time 1420  -CS      OT Time Calculation (min) 45 min  -CS      Total Timed Code Minutes- OT 45 minute(s)  -CS      OT Received On 06/01/23  -CS         Timed Charges    74135 - OT Therapeutic Activity Minutes 45  -CS         Total Minutes    Timed Charges Total Minutes 45  -CS       Total Minutes 45  -CS            User Key  (r) = Recorded By, (t) = Taken By, (c) = Cosigned By    Initials Name Provider Type    CS Valeria Castro COTA Occupational Therapist Assistant              Therapy Charges for Today     Code Description Service Date Service Provider Modifiers Qty    [id number]  OT THERAPEUTIC ACT EA 15 MIN 6/1/2023 Valeria Castro COTA GO 3               SHIRLENE Cardenas  6/1/2023

## 2023-06-01 NOTE — PLAN OF CARE
Problem: Adult Inpatient Plan of Care  Goal: Plan of Care Review  Outcome: Ongoing, Progressing  Flowsheets (Taken 6/1/2023 1840)  Progress: improving  Plan of Care Reviewed With: patient  Outcome Evaluation: pt up in chair most of the day no complaints/ s/s of distress noted  vss   Goal Outcome Evaluation:  Plan of Care Reviewed With: patient        Progress: improving  Outcome Evaluation: pt up in chair most of the day no complaints/ s/s of distress noted  vss

## 2023-06-01 NOTE — PROGRESS NOTES
[facility] Medicine   INPATIENT PROGRESS NOTE      Patient Name: Susanna Carr  Date of Admission: 5/29/2023  Today's Date: 05/31/23  Length of Stay: 1  Primary Care Physician: Troy Cheng MD    Subjective   Chief Complaint and HPI:  Patient admitted on 5/29/2023 with chief complaint of fall, mechanical fall with left hip pain.  Work-up shows left-sided intertrochanteric fracture.  Past medical history includes diabetes, hypertension.  On baby aspirin only at home.    Daily assessment 5/31/2023.  On evaluation the patient is currently sitting up in the bed.  She is stable.  No new issues or complaints.  The patient has had left hip fracture repair.  She states that she has a strong family support at home and is wanting to go home with home health on discharge.  Has been seen by orthopedic surgery and recommending home in 1 to 2 days.  She denies any uncontrolled pain.  No new issues or concerns.  No chest pain headache or dizziness.  No nausea vomiting diarrhea.      Review of Systems   Constitutional: Positive for fatigue. Negative for chills and fever.   HENT: Negative.  Negative for congestion.    Eyes: Negative.    Respiratory: Negative.  Negative for cough and shortness of breath.    Cardiovascular: Negative.  Negative for chest pain and palpitations.   Gastrointestinal: Negative.  Negative for abdominal distention, abdominal pain, nausea and vomiting.   Endocrine: Negative.    Genitourinary: Negative.    Musculoskeletal: Negative.         Left hip pain controlled with medication   Skin: Negative.    Neurological: Positive for weakness. Negative for dizziness.   Hematological: Negative.    Psychiatric/Behavioral: Negative.       Review of Systems as of 05/31/23   All pertinent negatives and positives are as above. All other systems have been reviewed and are negative unless otherwise stated.     Objective    Temp:  [98.2 °F (36.8 °C)-99.3 °F  (37.4 °C)] 98.2 °F (36.8 °C)  Heart Rate:  [] 96  Resp:  [18] 18  BP: (123-133)/(55-61) 128/61  Physical Exam  Vitals and nursing note reviewed.   Constitutional:       Appearance: Normal appearance.   HENT:      Head: Normocephalic and atraumatic.      Right Ear: External ear normal.      Left Ear: External ear normal.      Nose: Nose normal.      Mouth/Throat:      Mouth: Mucous membranes are moist.      Pharynx: Oropharynx is clear.   Eyes:      General: No scleral icterus.     Extraocular Movements: Extraocular movements intact.      Conjunctiva/sclera: Conjunctivae normal.      Pupils: Pupils are equal, round, and reactive to light.   Cardiovascular:      Rate and Rhythm: Normal rate and regular rhythm.      Heart sounds: No murmur heard.  Pulmonary:      Effort: Pulmonary effort is normal.      Breath sounds: Normal breath sounds.   Abdominal:      General: Bowel sounds are normal.      Palpations: Abdomen is soft.   Musculoskeletal:         General: Normal range of motion.      Cervical back: Normal range of motion and neck supple.      Comments: Wound stable.  Left hip   Skin:     General: Skin is warm and dry.      Capillary Refill: Capillary refill takes less than 2 seconds.   Neurological:      General: No focal deficit present.      Mental Status: She is alert and oriented to person, place, and time.      Motor: Weakness present.   Psychiatric:         Mood and Affect: Mood normal.         Behavior: Behavior normal.         Vitals and nursing note reviewed.   Constitutional:       General: No acute distress.     Appearance: Normal appearance.   HENT:      Head: Normocephalic and atraumatic.      Mouth: Mucous membranes are moist.   Eyes:      Conjunctivae/sclerae: Conjunctivae normal.      Pupils: Pupils are equal, round, and reactive to light.   Cardiovascular:      Rate and Rhythm: Normal rate and regular rhythm.   Pulmonary:      Effort: Pulmonary effort is normal.      Breath sounds: Normal  breath sounds.   Abdominal:      General: Abdomen is flat.      Palpations: Abdomen is soft.      Tenderness: No abdominal tenderness.   Genitourinary:     General:   normal  Musculoskeletal:    Tenderness to palpation of the left-sided hip     General: No signs of injury. Normal range of motion.   Skin:     General: Skin is warm and dry.   Neurological:      General: No focal deficit present.      Mental Status: Alert and oriented  Psychiatric:         Mood and Affect: Mood normal.         Behavior: Behavior normal.         Results Review:  I have reviewed the labs, radiology results, and diagnostic studies.    Laboratory Data:   Results from last 7 days   Lab Units 05/31/23  0551 05/30/23  1534 05/30/23  0541 05/29/23  1818   WBC 10*3/mm3 10.84*  --  9.30 14.19*   HEMOGLOBIN g/dL 9.5* 11.2* 11.3* 12.3   HEMATOCRIT % 27.6* 33.1* 33.6* 36.9   PLATELETS 10*3/mm3 198  --  242 253        Results from last 7 days   Lab Units 05/31/23  0551 05/30/23  0541 05/29/23  1818   SODIUM mmol/L 130* 131* 134*   POTASSIUM mmol/L 3.9 3.6 3.5   CHLORIDE mmol/L 94* 94* 94*   CO2 mmol/L 21.0* 25.0 22.0   BUN mg/dL 12 14 16   CREATININE mg/dL 0.68 0.59 0.65   CALCIUM mg/dL 8.5* 9.4 9.9   BILIRUBIN mg/dL  --   --  0.2   ALK PHOS U/L  --   --  68   ALT (SGPT) U/L  --   --  15   AST (SGOT) U/L  --   --  19   GLUCOSE mg/dL 297* 154* 199*       Culture Data:   No results found for: BLOODCX  No results found for: URINECX  No results found for: RESPCX  No results found for: WOUNDCX  No results found for: STOOLCX  No components found for: BODYFLD    Radiology Data:   Imaging Results (Last 24 Hours)     ** No results found for the last 24 hours. **          I have reviewed the patient's current medications.     Assessment/Plan     I have utilized all available immediate resources to obtain, update, or review the patient's current medications (including all prescriptions, over-the-counter products, herbals, cannabis/cannabidiol products, and  vitamin/mineral/dietary (nutritional) supplements).      Active Hospital Problems    Diagnosis    • **Traumatic closed intertrochanteric fracture of left femur with minimal displacement, initial encounter    • Fall at home, initial encounter    • Closed fracture of left hip, initial encounter    • Primary hypertension    • Controlled type 2 diabetes mellitus without complication, without long-term current use of insulin        Medical Decision Making  Number and Complexity of problems: As below    Conditions and Status:        Condition is improving.  Condition is at treatment goal.     MDM Data  External documents reviewed: None today  My EKG interpretation: None today  My plain film interpretation: None today  Tests considered but not ordered: None today       Discussed with: Patient, attending      Treatment Plan  As below    Care Planning  Shared decision making: With patient  Code status and discussions: Full code, CPR    Disposition  Social Determinants of Health that impact treatment or disposition:   I expect the patient to be discharged to home versus rehab to home in 2-3 days.      I confirmed that the patient's Advance Care Plan is present, code status is documented, or surrogate decision maker is listed in the patient's medical record.   ________________________________      Left Hip fracture              -Orthopedics consulting; status post left hip repair 5/30/2021.              -Pain and nausea control              -PT and OT after surgery; lives alone.  Should be able to go home with home health.                  DM              -Consistent carbohydrate diet, Acchecks with sliding scale insulin               Continue to monitor    HTN              -Continue atenolol, lisinopril PRN hydralazine     Hyperlipidemia  Continue atorvastatin        VTE PPx: SCDs  CODE STATUS full code    Copied text in this note has been reviewed and is accurate as of 05/31/23       Electronically signed by Jon YANCEY  DO Naren, 05/31/23, 21:25 CDT.

## 2023-06-02 ENCOUNTER — READMISSION MANAGEMENT (OUTPATIENT)
Dept: CALL CENTER | Facility: HOSPITAL | Age: 83
End: 2023-06-02

## 2023-06-02 ENCOUNTER — HOME HEALTH ADMISSION (OUTPATIENT)
Dept: HOME HEALTH SERVICES | Facility: HOME HEALTHCARE | Age: 83
End: 2023-06-02
Payer: MEDICARE

## 2023-06-02 VITALS
HEART RATE: 105 BPM | BODY MASS INDEX: 27.81 KG/M2 | RESPIRATION RATE: 18 BRPM | SYSTOLIC BLOOD PRESSURE: 105 MMHG | DIASTOLIC BLOOD PRESSURE: 63 MMHG | OXYGEN SATURATION: 97 % | TEMPERATURE: 97.4 F | WEIGHT: 147.3 LBS | HEIGHT: 61 IN

## 2023-06-02 LAB
GLUCOSE BLDC GLUCOMTR-MCNC: 213 MG/DL (ref 70–130)
GLUCOSE BLDC GLUCOMTR-MCNC: 262 MG/DL (ref 70–130)

## 2023-06-02 PROCEDURE — 99024 POSTOP FOLLOW-UP VISIT: CPT | Performed by: ORTHOPAEDIC SURGERY

## 2023-06-02 PROCEDURE — 63710000001 INSULIN ASPART PER 5 UNITS: Performed by: ORTHOPAEDIC SURGERY

## 2023-06-02 PROCEDURE — 25010000002 HEPARIN (PORCINE) PER 1000 UNITS: Performed by: ORTHOPAEDIC SURGERY

## 2023-06-02 PROCEDURE — 82948 REAGENT STRIP/BLOOD GLUCOSE: CPT

## 2023-06-02 RX ORDER — TRAMADOL HYDROCHLORIDE 50 MG/1
50 TABLET ORAL EVERY 8 HOURS PRN
Qty: 9 TABLET | Refills: 0 | Status: SHIPPED | OUTPATIENT
Start: 2023-06-02 | End: 2023-06-05

## 2023-06-02 RX ADMIN — ATORVASTATIN CALCIUM 20 MG: 20 TABLET, FILM COATED ORAL at 08:08

## 2023-06-02 RX ADMIN — ATENOLOL 12.5 MG: 25 TABLET ORAL at 08:08

## 2023-06-02 RX ADMIN — ACETAMINOPHEN 1000 MG: 500 TABLET, FILM COATED ORAL at 06:42

## 2023-06-02 RX ADMIN — FERROUS SULFATE TAB EC 324 MG (65 MG FE EQUIVALENT) 324 MG: 324 (65 FE) TABLET DELAYED RESPONSE at 08:07

## 2023-06-02 RX ADMIN — ASPIRIN 81 MG: 81 TABLET, CHEWABLE ORAL at 08:08

## 2023-06-02 RX ADMIN — INSULIN ASPART 4 UNITS: 100 INJECTION, SOLUTION INTRAVENOUS; SUBCUTANEOUS at 08:09

## 2023-06-02 RX ADMIN — HEPARIN SODIUM 5000 UNITS: 5000 INJECTION INTRAVENOUS; SUBCUTANEOUS at 06:11

## 2023-06-02 RX ADMIN — PANTOPRAZOLE SODIUM 40 MG: 40 TABLET, DELAYED RELEASE ORAL at 06:11

## 2023-06-02 RX ADMIN — Medication 10 ML: at 08:09

## 2023-06-02 RX ADMIN — HYDROCHLOROTHIAZIDE 12.5 MG: 12.5 TABLET ORAL at 08:08

## 2023-06-02 RX ADMIN — LISINOPRIL 2.5 MG: 2.5 TABLET ORAL at 08:08

## 2023-06-02 RX ADMIN — ACETAMINOPHEN 1000 MG: 500 TABLET, FILM COATED ORAL at 02:26

## 2023-06-02 NOTE — PLAN OF CARE
Goal Outcome Evaluation:  Plan of Care Reviewed With: patient        Progress: improving Has been drowsy  but arouses easily.Pain is reported as tolerable with scheduled tylenol.Mobility limited and LLE turning inward.

## 2023-06-02 NOTE — DISCHARGE SUMMARY
[facility] Medicine Services  DISCHARGE SUMMARY       Date of Admission: 5/29/2023  Date of Discharge:  6/2/2023  Primary Care Physician: Troy Cheng MD    Presenting Problem/History of Present Illness:  Hip fracture [S72.009A]  Closed fracture of left hip, initial encounter [S72.002A]       Final Discharge Diagnoses:  Active Hospital Problems    Diagnosis    • **Traumatic closed intertrochanteric fracture of left femur with minimal displacement, initial encounter    • Fall at home, initial encounter    • Closed fracture of left hip, initial encounter    • Primary hypertension    • Controlled type 2 diabetes mellitus without complication, without long-term current use of insulin        Consults:   Consults     Date and Time Order Name Status Description    5/29/2023  6:16 PM Orthopedics (on-call MD unless specified) Completed           Procedures Performed: Procedure(s):  LEFT HIP INTERTROCHANTERIC NAILING                Pertinent Test Results:   Lab Results (most recent)     Procedure Component Value Units Date/Time    POC Glucose Once [[id number]]  (Abnormal) Collected: 06/02/23 1038    Specimen: Blood Updated: 06/02/23 1057     Glucose 262 mg/dL      Comment: RN NotifiedOperator: [id number] CYRUS MARTINEZMeter ID: [id number]       POC Glucose Once [[id number]]  (Abnormal) Collected: 06/02/23 0703    Specimen: Blood Updated: 06/02/23 0802     Glucose 213 mg/dL      Comment: RN NotifiedOperator: [id number] CYRUS BAKERINAMeter ID: [id number]       Basic Metabolic Panel [[id number]]  (Abnormal) Collected: 06/01/23 0615    Specimen: Blood Updated: 06/01/23 0658     Glucose 288 mg/dL      BUN 17 mg/dL      Creatinine 0.73 mg/dL      Sodium 129 mmol/L      Potassium 4.3 mmol/L      Chloride 95 mmol/L      CO2 24.0 mmol/L      Calcium 8.8 mg/dL      BUN/Creatinine Ratio 23.3     Anion Gap 10.0 mmol/L      eGFR 81.2 mL/min/1.73     Narrative:      GFR  Normal >60  Chronic Kidney Disease <60  Kidney Failure <15    The GFR formula is only valid for adults with stable renal function between ages 18 and 70.    CBC & Differential [[id number]]  (Abnormal) Collected: 06/01/23 0615    Specimen: Blood Updated: 06/01/23 0641    Narrative:      The following orders were created for panel order CBC & Differential.  Procedure                               Abnormality         Status                     ---------                               -----------         ------                     CBC Auto Differential[[id number]]        Abnormal            Final result                 Please view results for these tests on the individual orders.    CBC Auto Differential [[id number]]  (Abnormal) Collected: 06/01/23 0615    Specimen: Blood Updated: 06/01/23 0641     WBC 8.09 10*3/mm3      RBC 3.18 10*6/mm3      Hemoglobin 8.7 g/dL      Hematocrit 26.0 %      MCV 81.8 fL      MCH 27.4 pg      MCHC 33.5 g/dL      RDW 12.9 %      RDW-SD 38.4 fl      MPV 10.8 fL      Platelets 190 10*3/mm3      Neutrophil % 64.6 %      Lymphocyte % 23.7 %      Monocyte % 9.6 %      Eosinophil % 1.0 %      Basophil % 0.7 %      Immature Grans % 0.4 %      Neutrophils, Absolute 5.22 10*3/mm3      Lymphocytes, Absolute 1.92 10*3/mm3      Monocytes, Absolute 0.78 10*3/mm3      Eosinophils, Absolute 0.08 10*3/mm3      Basophils, Absolute 0.06 10*3/mm3      Immature Grans, Absolute 0.03 10*3/mm3      nRBC 0.0 /100 WBC     Basic Metabolic Panel [[id number]]  (Abnormal) Collected: 05/31/23 0551    Specimen: Blood Updated: 05/31/23 0627     Glucose 297 mg/dL      BUN 12 mg/dL      Creatinine 0.68 mg/dL      Sodium 130 mmol/L      Potassium 3.9 mmol/L      Chloride 94 mmol/L      CO2 21.0 mmol/L      Calcium 8.5 mg/dL      BUN/Creatinine Ratio 17.6     Anion Gap 15.0 mmol/L      eGFR 86.0 mL/min/1.73     Narrative:      GFR Normal >60  Chronic Kidney Disease <60  Kidney Failure <15    The GFR formula is only valid for  adults with stable renal function between ages 18 and 70.    CBC & Differential [[id number]]  (Abnormal) Collected: 05/31/23 0551    Specimen: Blood Updated: 05/31/23 0607    Narrative:      The following orders were created for panel order CBC & Differential.  Procedure                               Abnormality         Status                     ---------                               -----------         ------                     CBC Auto Differential[[id number]]        Abnormal            Final result                 Please view results for these tests on the individual orders.    CBC Auto Differential [[id number]]  (Abnormal) Collected: 05/31/23 0551    Specimen: Blood Updated: 05/31/23 0607     WBC 10.84 10*3/mm3      RBC 3.49 10*6/mm3      Hemoglobin 9.5 g/dL      Hematocrit 27.6 %      MCV 79.1 fL      MCH 27.2 pg      MCHC 34.4 g/dL      RDW 12.9 %      RDW-SD 36.6 fl      MPV 10.7 fL      Platelets 198 10*3/mm3      Neutrophil % 69.2 %      Lymphocyte % 18.1 %      Monocyte % 11.6 %      Eosinophil % 0.0 %      Basophil % 0.6 %      Immature Grans % 0.5 %      Neutrophils, Absolute 7.50 10*3/mm3      Lymphocytes, Absolute 1.96 10*3/mm3      Monocytes, Absolute 1.26 10*3/mm3      Eosinophils, Absolute 0.00 10*3/mm3      Basophils, Absolute 0.07 10*3/mm3      Immature Grans, Absolute 0.05 10*3/mm3      nRBC 0.0 /100 WBC     Hemoglobin & Hematocrit, Blood [[id number]]  (Abnormal) Collected: 05/30/23 1534    Specimen: Blood Updated: 05/30/23 1539     Hemoglobin 11.2 g/dL      Hematocrit 33.1 %     Extra Tubes [[id number]] Collected: 05/29/23 1823    Specimen: Blood, Venous Line Updated: 05/29/23 1930    Narrative:      The following orders were created for panel order Extra Tubes.  Procedure                               Abnormality         Status                     ---------                               -----------         ------                     Gold Top - [facility][[id number]]                                    Final result                 Please view results for these tests on the individual orders.    Gold Top - [facility]ST [[id number]] Collected: 05/29/23 1823    Specimen: Blood Updated: 05/29/23 1930     Extra Tube Hold for add-ons.     Comment: Auto resulted.       Comprehensive Metabolic Panel [[id number]]  (Abnormal) Collected: 05/29/23 1818    Specimen: Blood Updated: 05/29/23 1843     Glucose 199 mg/dL      BUN 16 mg/dL      Creatinine 0.65 mg/dL      Sodium 134 mmol/L      Potassium 3.5 mmol/L      Chloride 94 mmol/L      CO2 22.0 mmol/L      Calcium 9.9 mg/dL      Total Protein 6.9 g/dL      Albumin 4.3 g/dL      ALT (SGPT) 15 U/L      AST (SGOT) 19 U/L      Alkaline Phosphatase 68 U/L      Total Bilirubin 0.2 mg/dL      Globulin 2.6 gm/dL      A/G Ratio 1.7 g/dL      BUN/Creatinine Ratio 24.6     Anion Gap 18.0 mmol/L      eGFR 86.9 mL/min/1.73     Narrative:      GFR Normal >60  Chronic Kidney Disease <60  Kidney Failure <15    The GFR formula is only valid for adults with stable renal function between ages 18 and 70.    Protime-INR [[id number]]  (Normal) Collected: 05/29/23 1818    Specimen: Blood Updated: 05/29/23 1842     Protime 12.2 Seconds      INR 0.90    Narrative:      Therapeutic range for most indications is 2.0-3.0 INR,  or 2.5-3.5 for mechanical heart valves.    aPTT [[id number]]  (Normal) Collected: 05/29/23 1818    Specimen: Blood Updated: 05/29/23 1842     PTT 28.0 seconds     Narrative:      The recommended Heparin therapeutic range is 68-97 seconds.    Urinalysis, Microscopic Only - Urine, Clean Catch [[id number]]  (Abnormal) Collected: 05/29/23 1819    Specimen: Urine, Clean Catch Updated: 05/29/23 1830     RBC, UA 0-2 /HPF      WBC, UA 0-2 /HPF      Bacteria, UA Trace /HPF      Squamous Epithelial Cells, UA 6-12 /HPF      Hyaline Casts, UA 0-2 /LPF      Methodology Automated Microscopy    Urinalysis With Microscopic If Indicated (No Culture) - Urine, Clean Catch [[id number]]  (Abnormal) Collected:  05/29/23 1819    Specimen: Urine, Clean Catch Updated: 05/29/23 1830     Color, UA Yellow     Appearance, UA Cloudy     pH, UA 8.0     Specific Gravity, UA 1.015     Glucose,  mg/dL (Trace)     Ketones, UA 15 mg/dL (1+)     Bilirubin, UA Negative     Blood, UA Negative     Protein, UA 30 mg/dL (1+)     Leuk Esterase, UA Negative     Nitrite, UA Negative     Urobilinogen, UA 0.2 E.U./dL        Imaging Results (Most Recent)     Procedure Component Value Units Date/Time    FL C Arm During Surgery [[id number]] Resulted: 05/30/23 1621     Updated: 05/30/23 1621    XR Hip With or Without Pelvis 2 - 3 View Left [[id number]] Collected: 05/29/23 1803     Updated: 05/29/23 1909    Narrative:      HISTORY:  Fall, hip pain.    COMPARISON:  None.    FINDINGS:  AP view of the pelvis and lateral view of the left hip demonstrate left  intertrochanteric fracture.  There is angulation.  There is no dislocation.      Impression:      Acute left intertrochanteric fracture.          Chief Complaint on Day of Discharge:   Denies any complaints on day of discharge pain is controlled  Hospital Course:  The patient is a 83 y.o. female who presented to [facility] with left hip fracture status postS/p IM shira left hip     Patient admitted on 5/29/2023 with chief complaint of fall, mechanical fall with left hip pain.  Work-up shows left-sided intertrochanteric fracture.  Past medical history includes diabetes, hypertension.  On baby aspirin only at home.    Impression: Hospital treatment  Left Hip fracture              -Orthopedics consulting; status post left hip repair 5/30/2021.              -Pain and nausea control.  Patient required Tylenol on day prior to discharge for pain control.  Will be discharged home with tramadol for 3 days as needed              -PT and OT after surgery; lives alone.  Should be able to go home with home health.  Doing well.  Patient does have good family support outside of the hospital.   "                   DM              -Consistent carbohydrate diet, Acchecks with sliding scale insulin               Continue to monitor     HTN              -Continue atenolol, lisinopril PRN hydralazine   Current blood pressure is 105/63.  And O2 saturation is 97% on room air.     Hyperlipidemia  Continue atorvastatin    Discharge assessment June 2, 2023  On day of discharge patient is doing well.  Has required only Tylenol for pain control over the last 24 hours.  Remains stable.  She is not having any new issues or concerns.  Vital signs are stable she is afebrile.  Will discharged home today with home health.  All results and findings were reviewed with the patient.  The patient states she understands and agrees to discharge treatment recommendations.    Condition on Discharge: Stable    Physical Exam on Discharge:  /63 (BP Location: Right arm, Patient Position: Lying)   Pulse 105   Temp 97.4 °F (36.3 °C) (Temporal)   Resp 18   Ht 154.9 cm (61\")   Wt 66.8 kg (147 lb 4.8 oz)   SpO2 97%   BMI 27.83 kg/m²   Physical Exam  Vitals and nursing note reviewed.   Constitutional:       Appearance: Normal appearance.   HENT:      Head: Normocephalic and atraumatic.      Right Ear: External ear normal.      Left Ear: External ear normal.      Nose: Nose normal.      Mouth/Throat:      Mouth: Mucous membranes are moist.      Pharynx: Oropharynx is clear.   Eyes:      General: No scleral icterus.     Extraocular Movements: Extraocular movements intact.      Conjunctiva/sclera: Conjunctivae normal.      Pupils: Pupils are equal, round, and reactive to light.   Cardiovascular:      Rate and Rhythm: Normal rate and regular rhythm.      Heart sounds: No murmur heard.  Pulmonary:      Effort: Pulmonary effort is normal.      Breath sounds: Normal breath sounds.   Abdominal:      General: Bowel sounds are normal.      Palpations: Abdomen is soft.   Musculoskeletal:         General: Normal range of motion.      Cervical " back: Normal range of motion and neck supple.      Comments: Wound stable.  Left hip   Skin:     General: Skin is warm and dry.      Capillary Refill: Capillary refill takes less than 2 seconds.   Neurological:      General: No focal deficit present.      Mental Status: She is alert and oriented to person, place, and time.      Motor: Weakness present.   Psychiatric:         Mood and Affect: Mood normal.         Behavior: Behavior normal.           Discharge Disposition:  Home-Health Care [facility]    Discharge Medications:     Discharge Medications      New Medications      Instructions Start Date   Eliquis 2.5 MG tablet tablet  Generic drug: apixaban   Take 1 tablet by mouth Every 12 (Twelve) Hours for 58 doses.      traMADol 50 MG tablet  Commonly known as: ULTRAM   50 mg, Oral, Every 8 Hours PRN         Continue These Medications      Instructions Start Date   aspirin 81 MG chewable tablet   81 mg, Oral, Daily      atenolol 25 MG tablet  Commonly known as: TENORMIN   12.5 mg, Oral, Daily, Take one-half tablet once daily       Calcium 500-100 MG-UNIT chewable tablet   1 tablet, Oral, 2 Times Daily, Plus d      ferrous sulfate 324 (65 Fe) MG tablet delayed-release EC tablet   324 mg, Oral, Daily With Breakfast      glipizide 5 MG tablet  Commonly known as: GLUCOTROL   5 mg, Oral, 2 Times Daily Before Meals      hydroCHLOROthiazide 12.5 MG tablet  Commonly known as: HYDRODIURIL   12.5 mg, Oral, Daily      lisinopril 2.5 MG tablet  Commonly known as: PRINIVIL,ZESTRIL   2.5 mg, Oral, Daily      metFORMIN 1000 MG tablet  Commonly known as: GLUCOPHAGE   1,000 mg, Oral, 2 Times Daily With Meals      omeprazole 20 MG capsule  Commonly known as: priLOSEC   20 mg, Oral, Daily      sennosides-docusate 8.6-50 MG per tablet  Commonly known as: PERICOLACE   1 tablet, Oral, Daily PRN      simvastatin 40 MG tablet  Commonly known as: ZOCOR   40 mg, Oral, Nightly      vitamin B-12 100 MCG tablet  Commonly known as: CYANOCOBALAMIN   50  mcg, Oral, Daily             Discharge Diet:   Diet Instructions     Diet: Diabetic Diets; Consistent Carbohydrate; Regular Texture (IDDSI 7); Thin (IDDSI 0)      Discharge Diet: Diabetic Diets    Diabetic Diet: Consistent Carbohydrate    Texture: Regular Texture (IDDSI 7)    Fluid Consistency: Thin (IDDSI 0)          Activity at Discharge:   Activity Instructions     Activity as Tolerated            Discharge Care Plan/Instructions:   Discharged home today with home health and physical therapy  Activity is as tolerated.  Diet is diabetic regular texture  Follow-up with primary care provider within 1 week of discharge.  Continue all medications as documented  Weightbearing as tolerated    Follow-up Appointments:   Future Appointments   Date Time Provider Department Center   6/3/2023 To Be Determined Yakelin Zapata RN [facility] [place]   6/5/2023 To Be Determined Idalmis Huddleston OT [facility] [place]   6/5/2023 To Be Determined Miguelina Jean PT [facility] [place]   6/13/2023  9:40 AM Anabell Osuna APRN [facility]       Test Results Pending at Discharge: None    Jon Sneed,     Time: Discharge: Greater than 30 minutes to complete

## 2023-06-02 NOTE — DISCHARGE PLACEMENT REQUEST
"Keagan Carr (83 y.o. Female)     Date of Birth   1940    Social Security Number       Address   [address] [place] [address]    Home Phone   [phone]    MRN   [id number]       Lutheran   Rastafarian    Marital Status                               Admission Date   5/29/23    Admission Type   Emergency    Admitting Provider   Geoff Jimenez MD    Attending Provider   Geoff Jimenez MD    Department, Room/Bed   [address], 416/1       Discharge Date       Discharge Disposition   Home-Health Care [facility] [place]    Discharge Destination                               Attending Provider: Geoff Jimenez MD    Allergies: Alendronate, Latex, Niacin And Related, Other    Isolation: None   Infection: None   Code Status: CPR    Ht: 154.9 cm (61\")   Wt: 66.8 kg (147 lb 4.8 oz)    Admission Cmt: None   Principal Problem: Traumatic closed intertrochanteric fracture of left femur with minimal displacement, initial encounter [S72.142A]                 Active Insurance as of 5/29/2023     Primary Coverage     Payor Plan Insurance Group Employer/Plan Group    HUMANA MEDICARE REPLACEMENT HUMANA MEDICARE REPLACEMENT [id number]     Payor Plan Address Payor Plan Phone Number Payor Plan Fax Number Effective Dates    [address] [address] [phone]  1/1/2012 - None Entered    [facility] [id number]       Subscriber Name Subscriber Birth Date Member ID       KEAGAN CARR 1940 [id number]                 Emergency Contacts      (Rel.) Home Phone Work Phone Mobile Phone    April Grady (Daughter) [phone] -- [phone]              "

## 2023-06-02 NOTE — PROGRESS NOTES
ORTHOPEDIC PROGRESS NOTE:    Name:  Susanna Carr  Date:    2023  Date of admission:  2023    Post op day:  3 Days Post-Op  Procedure:    Procedure(s) (LRB):  LEFT HIP INTERTROCHANTERIC NAILING (Left)    Subjective:    Pain improving  No new complaints.  No fever or chills      Vitals:     Vitals:    23 0404   BP: 124/58   Pulse: 88   Resp: 18   Temp: 98 °F (36.7 °C)   SpO2: 91%      Temp (24hrs), Av.2 °F (36.8 °C), Min:98 °F (36.7 °C), Max:98.6 °F (37 °C)      Exam:    Awake and alert  Toes up and down  Incision clean and dry - no erythema  Calves soft and nontender      ASSESSMENT:  Active Hospital Problems    Diagnosis  POA   • **Traumatic closed intertrochanteric fracture of left femur with minimal displacement, initial encounter [S72.142A]  Yes   • Fall at home, initial encounter [W19.XXXA, Y92.009]  Not Applicable   • Closed fracture of left hip, initial encounter [S72.002A]  Yes   • Primary hypertension [I10]  Yes   • Controlled type 2 diabetes mellitus without complication, without long-term current use of insulin [E11.9]  Yes         PLAN:    S/p IM shira left hip POD 3  Mobilize with PT/OT - then home health              WBAT              ROM as tolerated.              No true restrictions  DVT prophylaxis: subQ heparin until disposition determined              Home - will switch to eliquis              Anticipate discharge today or tomorrow.    F/u in ortho in 10-14 days.      23 at 06:54 CDT by Dominick Monae MD

## 2023-06-03 ENCOUNTER — HOME CARE VISIT (OUTPATIENT)
Dept: HOME HEALTH SERVICES | Facility: CLINIC | Age: 83
End: 2023-06-03
Payer: MEDICARE

## 2023-06-03 PROCEDURE — G0299 HHS/HOSPICE OF RN EA 15 MIN: HCPCS

## 2023-06-03 NOTE — OUTREACH NOTE
Prep Survey      Flowsheet Row Responses   [facility] facility patient discharged from? [facility]   Is LACE score < 7 ? No   Eligibility Readm Mgmt   Discharge diagnosis Traumatic closed intertrochanteric fracture of left femur with minimal displacement, initial encounter   Does the patient have one of the following disease processes/diagnoses(primary or secondary)? General Surgery   Does the patient have Home health ordered? Yes   What is the Home health agency?  [facility]   Is there a DME ordered? No   Prep survey completed? Yes            Nicky SHUKLA - Registered Nurse

## 2023-06-05 ENCOUNTER — HOME CARE VISIT (OUTPATIENT)
Dept: HOME HEALTH SERVICES | Facility: CLINIC | Age: 83
End: 2023-06-05
Payer: MEDICARE

## 2023-06-05 PROCEDURE — G0152 HHCP-SERV OF OT,EA 15 MIN: HCPCS

## 2023-06-05 NOTE — PAYOR COMM NOTE
"Abbey [place]more  [facility]  Case Management Extender  [phone] phone  [phone] fax      Auth# [id number]     Keagan Carr (83 y.o. Female)       Date of Birth   1940    Social Security Number       Address   [place] [facility] [address]    Home Phone       MRN   [id number]       Voodoo   Episcopalian    Marital Status                               Admission Date   5/29/23    Admission Type   Emergency    Admitting Provider   Geoff Jimenez MD    Attending Provider       Department, Room/Bed   [address], 416/1       Discharge Date   6/2/2023    Discharge Disposition   Home-Health Care [facility]    Discharge Destination                                 Attending Provider: (none)   Allergies: Alendronate, Latex, Niacin And Related, Other    Isolation: None   Infection: None   Code Status: Prior    Ht: 154.9 cm (61\")   Wt: 66.8 kg (147 lb 4.8 oz)    Admission Cmt: None   Principal Problem: Traumatic closed intertrochanteric fracture of left femur with minimal displacement, initial encounter [S72.142A]                   Active Insurance as of 5/29/2023       Primary Coverage       Payor Plan Insurance Group Employer/Plan Group    HUMANA MEDICARE REPLACEMENT HUMANA MEDICARE REPLACEMENT [id number]       Payor Plan Address Payor Plan Phone Number Payor Plan Fax Number Effective Dates    [address] [address] [phone]  1/1/2012 - None Entered    [facility] [address]         Subscriber Name Subscriber Birth Date Member ID       KEAGAN CARR 1940 [id number]                     Emergency Contacts        (Rel.) Home Phone Work Phone Mobile Phone    April Grady (Daughter) [phone] -- [phone]                 Discharge Summary        Jon Sneed DO at 06/02/23 [facility]8              [facility] Medicine Services  DISCHARGE SUMMARY       Date of " Admission: 5/29/2023  Date of Discharge:  6/2/2023  Primary Care Physician: Troy Cheng MD    Presenting Problem/History of Present Illness:  Hip fracture [S72.009A]  Closed fracture of left hip, initial encounter [S72.002A]       Final Discharge Diagnoses:  Active Hospital Problems    Diagnosis     **Traumatic closed intertrochanteric fracture of left femur with minimal displacement, initial encounter     Fall at home, initial encounter     Closed fracture of left hip, initial encounter     Primary hypertension     Controlled type 2 diabetes mellitus without complication, without long-term current use of insulin        Consults:   Consults       Date and Time Order Name Status Description    5/29/2023  6:16 PM Orthopedics (on-call MD unless specified) Completed             Procedures Performed: Procedure(s):  LEFT HIP INTERTROCHANTERIC NAILING                Pertinent Test Results:   Lab Results (most recent)       Procedure Component Value Units Date/Time    POC Glucose Once [[id number]]  (Abnormal) Collected: 06/02/23 1038    Specimen: Blood Updated: 06/02/23 1057     Glucose 262 mg/dL      Comment: RN NotifiedOperator: [id number] CYRUS MARTINEZMeter ID: [id number]       POC Glucose Once [[id number]]  (Abnormal) Collected: 06/02/23 0703    Specimen: Blood Updated: 06/02/23 0802     Glucose 213 mg/dL      Comment: RN NotifiedOperator: [id number] CYRUS MARTINEZMeter ID: [id number]       Basic Metabolic Panel [[id number]]  (Abnormal) Collected: 06/01/23 0615    Specimen: Blood Updated: 06/01/23 0658     Glucose 288 mg/dL      BUN 17 mg/dL      Creatinine 0.73 mg/dL      Sodium 129 mmol/L      Potassium 4.3 mmol/L      Chloride 95 mmol/L      CO2 24.0 mmol/L      Calcium 8.8 mg/dL      BUN/Creatinine Ratio 23.3     Anion Gap 10.0 mmol/L      eGFR 81.2 mL/min/1.73     Narrative:      GFR Normal >60  Chronic Kidney Disease <60  Kidney Failure <15    The GFR formula is only valid for adults with  stable renal function between ages 18 and 70.    CBC & Differential [[id number]]  (Abnormal) Collected: 06/01/23 0615    Specimen: Blood Updated: 06/01/23 0641    Narrative:      The following orders were created for panel order CBC & Differential.  Procedure                               Abnormality         Status                     ---------                               -----------         ------                     CBC Auto Differential[[id number]]        Abnormal            Final result                 Please view results for these tests on the individual orders.    CBC Auto Differential [[id number]]  (Abnormal) Collected: 06/01/23 0615    Specimen: Blood Updated: 06/01/23 0641     WBC 8.09 10*3/mm3      RBC 3.18 10*6/mm3      Hemoglobin 8.7 g/dL      Hematocrit 26.0 %      MCV 81.8 fL      MCH 27.4 pg      MCHC 33.5 g/dL      RDW 12.9 %      RDW-SD 38.4 fl      MPV 10.8 fL      Platelets 190 10*3/mm3      Neutrophil % 64.6 %      Lymphocyte % 23.7 %      Monocyte % 9.6 %      Eosinophil % 1.0 %      Basophil % 0.7 %      Immature Grans % 0.4 %      Neutrophils, Absolute 5.22 10*3/mm3      Lymphocytes, Absolute 1.92 10*3/mm3      Monocytes, Absolute 0.78 10*3/mm3      Eosinophils, Absolute 0.08 10*3/mm3      Basophils, Absolute 0.06 10*3/mm3      Immature Grans, Absolute 0.03 10*3/mm3      nRBC 0.0 /100 WBC     Basic Metabolic Panel [[id number]]  (Abnormal) Collected: 05/31/23 0551    Specimen: Blood Updated: 05/31/23 0627     Glucose 297 mg/dL      BUN 12 mg/dL      Creatinine 0.68 mg/dL      Sodium 130 mmol/L      Potassium 3.9 mmol/L      Chloride 94 mmol/L      CO2 21.0 mmol/L      Calcium 8.5 mg/dL      BUN/Creatinine Ratio 17.6     Anion Gap 15.0 mmol/L      eGFR 86.0 mL/min/1.73     Narrative:      GFR Normal >60  Chronic Kidney Disease <60  Kidney Failure <15    The GFR formula is only valid for adults with stable renal function between ages 18 and 70.    CBC & Differential [[id number]]  (Abnormal)  Collected: 05/31/23 0551    Specimen: Blood Updated: 05/31/23 0607    Narrative:      The following orders were created for panel order CBC & Differential.  Procedure                               Abnormality         Status                     ---------                               -----------         ------                     CBC Auto Differential[[id number]]        Abnormal            Final result                 Please view results for these tests on the individual orders.    CBC Auto Differential [[id number]]  (Abnormal) Collected: 05/31/23 0551    Specimen: Blood Updated: 05/31/23 0607     WBC 10.84 10*3/mm3      RBC 3.49 10*6/mm3      Hemoglobin 9.5 g/dL      Hematocrit 27.6 %      MCV 79.1 fL      MCH 27.2 pg      MCHC 34.4 g/dL      RDW 12.9 %      RDW-SD 36.6 fl      MPV 10.7 fL      Platelets 198 10*3/mm3      Neutrophil % 69.2 %      Lymphocyte % 18.1 %      Monocyte % 11.6 %      Eosinophil % 0.0 %      Basophil % 0.6 %      Immature Grans % 0.5 %      Neutrophils, Absolute 7.50 10*3/mm3      Lymphocytes, Absolute 1.96 10*3/mm3      Monocytes, Absolute 1.26 10*3/mm3      Eosinophils, Absolute 0.00 10*3/mm3      Basophils, Absolute 0.07 10*3/mm3      Immature Grans, Absolute 0.05 10*3/mm3      nRBC 0.0 /100 WBC     Hemoglobin & Hematocrit, Blood [[id number]]  (Abnormal) Collected: 05/30/23 1534    Specimen: Blood Updated: 05/30/23 1539     Hemoglobin 11.2 g/dL      Hematocrit 33.1 %     Extra Tubes [[id number]] Collected: 05/29/23 1823    Specimen: Blood, Venous Line Updated: 05/29/23 1930    Narrative:      The following orders were created for panel order Extra Tubes.  Procedure                               Abnormality         Status                     ---------                               -----------         ------                     Gold Top - SST[[id number]]                                   Final result                 Please view results for these tests on the individual orders.    Gold Top - [facility]  [[id number]] Collected: 05/29/23 1823    Specimen: Blood Updated: 05/29/23 1930     Extra Tube Hold for add-ons.     Comment: Auto resulted.       Comprehensive Metabolic Panel [[id number]]  (Abnormal) Collected: 05/29/23 1818    Specimen: Blood Updated: 05/29/23 1843     Glucose 199 mg/dL      BUN 16 mg/dL      Creatinine 0.65 mg/dL      Sodium 134 mmol/L      Potassium 3.5 mmol/L      Chloride 94 mmol/L      CO2 22.0 mmol/L      Calcium 9.9 mg/dL      Total Protein 6.9 g/dL      Albumin 4.3 g/dL      ALT (SGPT) 15 U/L      AST (SGOT) 19 U/L      Alkaline Phosphatase 68 U/L      Total Bilirubin 0.2 mg/dL      Globulin 2.6 gm/dL      A/G Ratio 1.7 g/dL      BUN/Creatinine Ratio 24.6     Anion Gap 18.0 mmol/L      eGFR 86.9 mL/min/1.73     Narrative:      GFR Normal >60  Chronic Kidney Disease <60  Kidney Failure <15    The GFR formula is only valid for adults with stable renal function between ages 18 and 70.    Protime-INR [[id number]]  (Normal) Collected: 05/29/23 1818    Specimen: Blood Updated: 05/29/23 1842     Protime 12.2 Seconds      INR 0.90    Narrative:      Therapeutic range for most indications is 2.0-3.0 INR,  or 2.5-3.5 for mechanical heart valves.    aPTT [[id number]]  (Normal) Collected: 05/29/23 1818    Specimen: Blood Updated: 05/29/23 1842     PTT 28.0 seconds     Narrative:      The recommended Heparin therapeutic range is 68-97 seconds.    Urinalysis, Microscopic Only - Urine, Clean Catch [[id number]]  (Abnormal) Collected: 05/29/23 1819    Specimen: Urine, Clean Catch Updated: 05/29/23 1830     RBC, UA 0-2 /HPF      WBC, UA 0-2 /HPF      Bacteria, UA Trace /HPF      Squamous Epithelial Cells, UA 6-12 /HPF      Hyaline Casts, UA 0-2 /LPF      Methodology Automated Microscopy    Urinalysis With Microscopic If Indicated (No Culture) - Urine, Clean Catch [[id number]]  (Abnormal) Collected: 05/29/23 1819    Specimen: Urine, Clean Catch Updated: 05/29/23 1830     Color, UA Yellow     Appearance, UA Cloudy      pH, UA 8.0     Specific Gravity, UA 1.015     Glucose,  mg/dL (Trace)     Ketones, UA 15 mg/dL (1+)     Bilirubin, UA Negative     Blood, UA Negative     Protein, UA 30 mg/dL (1+)     Leuk Esterase, UA Negative     Nitrite, UA Negative     Urobilinogen, UA 0.2 E.U./dL          Imaging Results (Most Recent)       Procedure Component Value Units Date/Time    FL C Arm During Surgery [[id number]] Resulted: 05/30/23 1621     Updated: 05/30/23 1621    XR Hip With or Without Pelvis 2 - 3 View Left [[id number]] Collected: 05/29/23 1803     Updated: 05/29/23 1909    Narrative:      HISTORY:  Fall, hip pain.    COMPARISON:  None.    FINDINGS:  AP view of the pelvis and lateral view of the left hip demonstrate left  intertrochanteric fracture.  There is angulation.  There is no dislocation.      Impression:      Acute left intertrochanteric fracture.            Chief Complaint on Day of Discharge:   Denies any complaints on day of discharge pain is controlled  Hospital Course:  The patient is a 83 y.o. female who presented to [facility] with left hip fracture status postS/p IM shira left hip     Patient admitted on 5/29/2023 with chief complaint of fall, mechanical fall with left hip pain.  Work-up shows left-sided intertrochanteric fracture.  Past medical history includes diabetes, hypertension.  On baby aspirin only at home.    Impression: Hospital treatment  Left Hip fracture              -Orthopedics consulting; status post left hip repair 5/30/2021.              -Pain and nausea control.  Patient required Tylenol on day prior to discharge for pain control.  Will be discharged home with tramadol for 3 days as needed              -PT and OT after surgery; lives alone.  Should be able to go home with home health.  Doing well.  Patient does have good family support outside of the hospital.                     DM              -Consistent carbohydrate diet, Acchecks with sliding scale insulin               " Continue to monitor     HTN              -Continue atenolol, lisinopril PRN hydralazine   Current blood pressure is 105/63.  And O2 saturation is 97% on room air.     Hyperlipidemia  Continue atorvastatin    Discharge assessment June 2, 2023  On day of discharge patient is doing well.  Has required only Tylenol for pain control over the last 24 hours.  Remains stable.  She is not having any new issues or concerns.  Vital signs are stable she is afebrile.  Will discharged home today with home health.  All results and findings were reviewed with the patient.  The patient states she understands and agrees to discharge treatment recommendations.    Condition on Discharge: Stable    Physical Exam on Discharge:  /63 (BP Location: Right arm, Patient Position: Lying)   Pulse 105   Temp 97.4 °F (36.3 °C) (Temporal)   Resp 18   Ht 154.9 cm (61\")   Wt 66.8 kg (147 lb 4.8 oz)   SpO2 97%   BMI 27.83 kg/m²   Physical Exam  Vitals and nursing note reviewed.   Constitutional:       Appearance: Normal appearance.   HENT:      Head: Normocephalic and atraumatic.      Right Ear: External ear normal.      Left Ear: External ear normal.      Nose: Nose normal.      Mouth/Throat:      Mouth: Mucous membranes are moist.      Pharynx: Oropharynx is clear.   Eyes:      General: No scleral icterus.     Extraocular Movements: Extraocular movements intact.      Conjunctiva/sclera: Conjunctivae normal.      Pupils: Pupils are equal, round, and reactive to light.   Cardiovascular:      Rate and Rhythm: Normal rate and regular rhythm.      Heart sounds: No murmur heard.  Pulmonary:      Effort: Pulmonary effort is normal.      Breath sounds: Normal breath sounds.   Abdominal:      General: Bowel sounds are normal.      Palpations: Abdomen is soft.   Musculoskeletal:         General: Normal range of motion.      Cervical back: Normal range of motion and neck supple.      Comments: Wound stable.  Left hip   Skin:     General: Skin is " warm and dry.      Capillary Refill: Capillary refill takes less than 2 seconds.   Neurological:      General: No focal deficit present.      Mental Status: She is alert and oriented to person, place, and time.      Motor: Weakness present.   Psychiatric:         Mood and Affect: Mood normal.         Behavior: Behavior normal.           Discharge Disposition:  Home-Health Care [facility]    Discharge Medications:     Discharge Medications        New Medications        Instructions Start Date   Eliquis 2.5 MG tablet tablet  Generic drug: apixaban   Take 1 tablet by mouth Every 12 (Twelve) Hours for 58 doses.      traMADol 50 MG tablet  Commonly known as: ULTRAM   50 mg, Oral, Every 8 Hours PRN             Continue These Medications        Instructions Start Date   aspirin 81 MG chewable tablet   81 mg, Oral, Daily      atenolol 25 MG tablet  Commonly known as: TENORMIN   12.5 mg, Oral, Daily, Take one-half tablet once daily       Calcium 500-100 MG-UNIT chewable tablet   1 tablet, Oral, 2 Times Daily, Plus d      ferrous sulfate 324 (65 Fe) MG tablet delayed-release EC tablet   324 mg, Oral, Daily With Breakfast      glipizide 5 MG tablet  Commonly known as: GLUCOTROL   5 mg, Oral, 2 Times Daily Before Meals      hydroCHLOROthiazide 12.5 MG tablet  Commonly known as: HYDRODIURIL   12.5 mg, Oral, Daily      lisinopril 2.5 MG tablet  Commonly known as: PRINIVIL,ZESTRIL   2.5 mg, Oral, Daily      metFORMIN 1000 MG tablet  Commonly known as: GLUCOPHAGE   1,000 mg, Oral, 2 Times Daily With Meals      omeprazole 20 MG capsule  Commonly known as: priLOSEC   20 mg, Oral, Daily      sennosides-docusate 8.6-50 MG per tablet  Commonly known as: PERICOLACE   1 tablet, Oral, Daily PRN      simvastatin 40 MG tablet  Commonly known as: ZOCOR   40 mg, Oral, Nightly      vitamin B-12 100 MCG tablet  Commonly known as: CYANOCOBALAMIN   50 mcg, Oral, Daily               Discharge Diet:   Diet Instructions       Diet: Diabetic Diets;  Consistent Carbohydrate; Regular Texture (IDDSI 7); Thin (IDDSI 0)      Discharge Diet: Diabetic Diets    Diabetic Diet: Consistent Carbohydrate    Texture: Regular Texture (IDDSI 7)    Fluid Consistency: Thin (IDDSI 0)            Activity at Discharge:   Activity Instructions       Activity as Tolerated              Discharge Care Plan/Instructions:   Discharged home today with home health and physical therapy  Activity is as tolerated.  Diet is diabetic regular texture  Follow-up with primary care provider within 1 week of discharge.  Continue all medications as documented  Weightbearing as tolerated    Follow-up Appointments:   Future Appointments   Date Time Provider Department Center   6/3/2023 To Be Determined Yakelin Zapata RN [facility]   6/5/2023 To Be Determined Idalmis Huddleston OT [facility]   6/5/2023 To Be Determined Miguelina Jean PT [facility]   6/13/2023  9:40 AM Anabell Osuna APRN [facility]       Test Results Pending at Discharge: None    Dre Hsu DO    Time: Discharge: Greater than 30 minutes to complete    Electronically signed by Dre Hsu DO at 06/02/23 1346       Discharge Order (From admission, onward)       Start     Ordered    06/02/23 0745  Discharge patient  Once        Expected Discharge Date: 06/02/23    Discharge Disposition: Home-Health Care [facility]    Physician of Record for Attribution - Please select from Treatment Team: DRE HSU [[id number]]    Review needed by CMO to determine Physician of Record: No       Question Answer Comment   Physician of Record for Attribution - Please select from Treatment Team DRE HSU    Review needed by CMO to determine Physician of Record No        06/02/23 0751    06/01/23 0658  Discharge patient  Once,   Status:  Canceled        Expected Discharge Date: 06/01/23    Discharge Disposition: Home-Health Care [facility]    Physician of Record for Attribution - Please select from Treatment Team:  DRE HSU [[id number]]    Review needed by CMO to determine Physician of Record: No       Question Answer Comment   Physician of Record for Attribution - Please select from Treatment Team DRE HSU    Review needed by CMO to determine Physician of Record No        06/01/23 0659

## 2023-06-05 NOTE — Clinical Note
"Huddle  / Case Conference Note    Medical Necessity and focus of care: THE PATIENT IS RECOVERING FROM A RECENT FALL, HIP FRACTURE AND REPAIR. SHE IS NOW HOME AND REQUIRING ASSISTANCE FOR FUNCTIONAL MOBILITY AND ADLS. SHE IS AT RISK FOR FUTURE FALLS AND DEPENDENCE ON CAREGIVERS. Patient requires skilled home based occupational therapy services for remediation of deficits of decreased functional use and weakness of B UEs, standing balance, endurance, and safety to maximize her independence with ADLs, transfers, and functional mobility with activities within the home environment.     Caregiver Status: DAUGHTER AND GRAND DAUGHTER STAY WITH PT  Patient's goal(s): \"BE ABLE TO STAY HERE ALONE AND TAKE CARE OF MYSELF AGAIN\"  GG Discharge Goal:  Medication Issus:   Environmental/ Physical issues:WBAT   Any additional needs: NA    Based upon record review and collaboration conference, the recommended frequency for this patient is  SN-----  PT-----  OT--1WK1, 2WK2, 1WK1--  ST-----  HHA---  MSW---  Provider____DR GARCIA ______ Notified @ ______6/5/23___ and agrees with POC.     Please verify your eval  scores: (Answer the scores  that pertain to your area of focus remove the others)    3  "

## 2023-06-06 ENCOUNTER — HOME CARE VISIT (OUTPATIENT)
Dept: HOME HEALTH SERVICES | Facility: CLINIC | Age: 83
End: 2023-06-06
Payer: MEDICARE

## 2023-06-06 VITALS
TEMPERATURE: 98.5 F | DIASTOLIC BLOOD PRESSURE: 70 MMHG | HEART RATE: 78 BPM | RESPIRATION RATE: 17 BRPM | SYSTOLIC BLOOD PRESSURE: 126 MMHG

## 2023-06-06 VITALS
DIASTOLIC BLOOD PRESSURE: 68 MMHG | RESPIRATION RATE: 18 BRPM | HEART RATE: 90 BPM | OXYGEN SATURATION: 97 % | SYSTOLIC BLOOD PRESSURE: 108 MMHG | TEMPERATURE: 98.6 F

## 2023-06-06 PROCEDURE — G0151 HHCP-SERV OF PT,EA 15 MIN: HCPCS

## 2023-06-06 NOTE — Clinical Note
"Huddle  / Case Conference Note  Medical Necessity and focus of care: PATIENT WAS HOSPITALIZED SECONDARY A FALL WITH  A HIP FRACTURE AND RANDALL PLACEMENT. PATIENT HAS WEAKNESS IN LEFT LE, DIFFICULTY WITH GAIT, TRANSFERS, AND BALANCE. PATIENT NEEDS SKILLED PT SERVIECS TO ADDRESS LIMIATIONS AND RETURN PATIENT TO [place] [facility] POTENTIAL  Caregiver Status: FULL TIME FAMILY  Patient's goal(s): \" GET HER STRENGTH BACK\"  GG Discharge Goal: 150 FEET 6  Medication Issus: NONE   Environmental/ Physical issues:  Any additional needs:    Based upon record review and collaboration conference, the recommended frequency for this patient is   SN-----  PT----- 2W2 1W2  OT----  ST-----  HHA---  MSW---  Provider___DR GARCIA___ Notified @ _________ and agrees with POC     Please verify your eval  scores: (Answer the scores  that pertain to your area of focus remove the others)    5   2   3        M 1700        "

## 2023-06-07 ENCOUNTER — READMISSION MANAGEMENT (OUTPATIENT)
Dept: CALL CENTER | Facility: HOSPITAL | Age: 83
End: 2023-06-07
Payer: MEDICARE

## 2023-06-07 VITALS
TEMPERATURE: 97.2 F | SYSTOLIC BLOOD PRESSURE: 140 MMHG | HEART RATE: 76 BPM | RESPIRATION RATE: 16 BRPM | DIASTOLIC BLOOD PRESSURE: 72 MMHG | OXYGEN SATURATION: 97 %

## 2023-06-07 NOTE — HOME HEALTH
"PATIENT WAS HOSPITALIZED FROM 5/29/2023- 6/2/2023 SECONDARY TO A LEFT HIP FRACTURE WITH ORIF WITH LEFT HIP INTERTROCHANTERIC NAILING. PATIENT HAD A FALL IN HER HOME THAT RESULTED IN A HIP FRACUTRE. SHE HAS WEAKNESS IN HER LEFT HIP, DIFFICULTY WITH TRANSFERS, DIFFICULTY WITH AMBULATION AND OVERALL PAIN LINITNG HER ACTIVITY.     PMHX: CLOSED FRACTURE OF LEFT PUBIS, PATHOLOGIAL FRACTURE OF T12, FRACTURE OF L2, OP, DM, HTN    PATIENT GOAL: \" GET HER STRENGTH BACK\"    PRIOR LEVEL OF FUNCITON: PATIENT IS NORMALLY INDEPENDENT WITH ALL ACTIVITY AND DRIVES HERSELF"

## 2023-06-07 NOTE — HOME HEALTH
OCCUPATIONAL THERAPY EVALUATION       REASON FOR REFERRAL:   THE PATIENT IS AN 84 YO FEMALE WHO WAS ADMITTED TO [facility] [place] 5/29/23-6/2/23 AFTER FALL AT HOME AND RESULTING LEFT HIP FRACTURE AND HAD REPAIR WITH RANDALL PLACEMENT PERFORMED BY DR ESCALANTE, SHE IS HOME WITH FAMILY STAYING AND IS WBAT       PAST MEDICAL HISTORY: DM, HTN, H/O FALLS,      HOME SOCIAL ENVIRONMENT: Patient lives in her own home, ONE LEVEL HOME WITH 2 SMALL STEPS TO ENTER. DAUGHTER AND GRAND DAUGHTER ARE STAYING WITH HER, GRAND DAUGHTER IS LPN, PT IS USING A RW AND SLEEPING IN HER RECLINER      PRIOR LEVEL OF FUNCTION: pt lived alone, she used cane occassionally, reported independent-mod ind with ADLs and most IADLS, walked to her daughters home nearby occassionally       CLINICAL FINDINGS:   UPPER EXTREMITY ASSESSMENT:     pt is right handed, AROM IS WNL, MMS IS 4/5  /PINCH:          4/5                               FINE MOTOR COORDINATION:      INTACT                UPPER EXTREMITY GROSS MOTOR COORDINATION: INTACT   SENSATION: NO IMPAIRMENTS                                                                     FUNCTIONAL ENDURANCE FOR SAFE ACTIVITIES OF DAILY LIVING/INSTRUMENTAL ACTIVITIES OF DAILY LIVING:  Limited endurance noted throughout evaluation requiring frequent rest breaks.          BALANCE:              SITTING BALANCE: GOOD   STANDING BALANCE:   POOR+  WEIGHT BEARING STATUS/PRECAUTIONS:   WBAT  ASSISTIVE DEVICES: RW, tub/ shower with shower curtain and hand held shower with transfer bench with back, rollator, cane, BSC      MEDICAL NECESSITY:  THE PATIENT IS RECOVERING FROM A RECENT FALL, HIP FRACTURE AND REPAIR. SHE IS NOW HOME AND REQUIRING ASSISTANCE FOR FUNCTIONAL MOBILITY AND ADLS. SHE IS AT RISK FOR FUTURE FALLS AND DEPENDENCE ON CAREGIVERS. Patient requires skilled home based occupational therapy services for remediation of deficits of decreased functional use and weakness of B UEs, standing balance, endurance, and safety to  "maximize her independence with ADLs, transfers, and functional mobility with activities within the home environment.       PATIENT GOAL FOR THIS EPISODE OF CARE: \"BE ABLE TO TAKE CARE OF MYSELF AND STAY HERE ALONE AGAIN\"  REHAB POTENTIAL :  [facility]   DATE OF NEXT APPOINTMENT WITH DOCTOR: 6/7 Dr Cheng; 6/13 Dr Monae surgery follow up      ANTICIPATED DISCHARGE PLAN:  TO SELF/CAREGIVER   PATIENT / CAREGIVER AGREE WITH DISCHARGE PLAN: YES   COMMUNICATION / CARE COORDINATION:  Case communication note to admitting RN with Functional Hailey Scores/# of visits.   WISH TO ADDRESS BATHING WITH OT: YES"

## 2023-06-07 NOTE — OUTREACH NOTE
General Surgery Week 1 Survey      Flowsheet Row Responses   [facility] patient discharged from? [place]   Does the patient have one of the following disease processes/diagnoses(primary or secondary)? General Surgery   Week 1 attempt successful? Yes   Call start time 0828   Call end time 0831   Discharge diagnosis Traumatic closed intertrochanteric fracture of left femur with minimal displacement, initial encounter   Meds reviewed with patient/caregiver? Yes   Is the patient having any side effects they believe may be caused by any medication additions or changes? No   Does the patient have all medications related to this admission filled (includes all antibiotics, pain medications, etc.) Yes   Is the patient taking all medications as directed (includes completed medication regime)? Yes   Does the patient have a follow up appointment scheduled with their surgeon? Yes   Has the patient kept scheduled appointments due by today? N/A   Comments PCP appt today   What is the Home health agency?  [facility]   Has home health visited the patient within 72 hours of discharge? Yes   Psychosocial issues? No   Did the patient receive a copy of their discharge instructions? Yes   Nursing interventions Reviewed instructions with patient   What is the patient's perception of their health status since discharge? Improving   Nursing interventions Nurse provided patient education   Is the patient /caregiver able to teach back basic post-op care? Take showers only when approved by MD-sponge bathe until then, No tub bath, swimming, or hot tub until instructed by MD, Lifting as instructed by MD in discharge instructions, Drive as instructed by MD in discharge instructions   Is the patient/caregiver able to teach back signs and symptoms of incisional infection? Increased redness, swelling or pain at the incisonal site, Increased drainage or bleeding, Incisional warmth, Pus or odor from incision, Fever   Is the  patient/caregiver able to teach back steps to recovery at home? Eat a well-balance diet, Set small, achievable goals for return to baseline health   Is the patient/caregiver able to teach back the hierarchy of who to call/visit for symptoms/problems? PCP, Specialist, Home health nurse, Urgent Care, ED, 911 Yes   Week 1 call completed? Yes   Wrap up additional comments Pt reports she is making progress. Has appt today with PCP. No needs at this time.            UBALDO RODRIGUES - Registered Nurse

## 2023-06-08 ENCOUNTER — HOME CARE VISIT (OUTPATIENT)
Dept: HOME HEALTH SERVICES | Facility: CLINIC | Age: 83
End: 2023-06-08
Payer: MEDICARE

## 2023-06-08 VITALS
HEART RATE: 92 BPM | DIASTOLIC BLOOD PRESSURE: 78 MMHG | SYSTOLIC BLOOD PRESSURE: 120 MMHG | TEMPERATURE: 97.9 F | OXYGEN SATURATION: 96 % | RESPIRATION RATE: 20 BRPM

## 2023-06-08 PROCEDURE — G0300 HHS/HOSPICE OF LPN EA 15 MIN: HCPCS

## 2023-06-08 PROCEDURE — G0157 HHC PT ASSISTANT EA 15: HCPCS

## 2023-06-12 ENCOUNTER — HOME CARE VISIT (OUTPATIENT)
Dept: HOME HEALTH SERVICES | Facility: CLINIC | Age: 83
End: 2023-06-12
Payer: MEDICARE

## 2023-06-12 VITALS
HEART RATE: 60 BPM | DIASTOLIC BLOOD PRESSURE: 58 MMHG | RESPIRATION RATE: 18 BRPM | OXYGEN SATURATION: 98 % | SYSTOLIC BLOOD PRESSURE: 138 MMHG | TEMPERATURE: 98.6 F

## 2023-06-12 DIAGNOSIS — S72.142A: Primary | ICD-10-CM

## 2023-06-12 PROCEDURE — G0158 HHC OT ASSISTANT EA 15: HCPCS

## 2023-06-12 PROCEDURE — G0157 HHC PT ASSISTANT EA 15: HCPCS

## 2023-06-13 ENCOUNTER — OFFICE VISIT (OUTPATIENT)
Dept: ORTHOPEDIC SURGERY | Facility: CLINIC | Age: 83
End: 2023-06-13
Payer: MEDICARE

## 2023-06-13 VITALS
TEMPERATURE: 98.6 F | SYSTOLIC BLOOD PRESSURE: 120 MMHG | OXYGEN SATURATION: 98 % | RESPIRATION RATE: 16 BRPM | DIASTOLIC BLOOD PRESSURE: 78 MMHG | HEART RATE: 90 BPM | OXYGEN SATURATION: 97 % | RESPIRATION RATE: 18 BRPM | HEART RATE: 60 BPM | DIASTOLIC BLOOD PRESSURE: 60 MMHG | SYSTOLIC BLOOD PRESSURE: 134 MMHG

## 2023-06-13 VITALS — HEIGHT: 61 IN | WEIGHT: 147 LBS | BODY MASS INDEX: 27.75 KG/M2

## 2023-06-13 DIAGNOSIS — M80.00XA AGE-RELATED OSTEOPOROSIS WITH CURRENT PATHOLOGICAL FRACTURE, INITIAL ENCOUNTER: ICD-10-CM

## 2023-06-13 DIAGNOSIS — Z98.890 STATUS POST HIP SURGERY: ICD-10-CM

## 2023-06-13 DIAGNOSIS — Z09 ENCOUNTER FOR FOLLOW-UP EXAMINATION AFTER COMPLETED TREATMENT FOR CONDITIONS OTHER THAN MALIGNANT NEOPLASM: ICD-10-CM

## 2023-06-13 DIAGNOSIS — S72.142A: Primary | ICD-10-CM

## 2023-06-13 PROCEDURE — 99024 POSTOP FOLLOW-UP VISIT: CPT | Performed by: NURSE PRACTITIONER

## 2023-06-13 RX ORDER — HYDROCODONE BITARTRATE AND ACETAMINOPHEN 5; 325 MG/1; MG/1
1 TABLET ORAL EVERY 8 HOURS PRN
Qty: 30 TABLET | Refills: 0 | Status: SHIPPED | OUTPATIENT
Start: 2023-06-13

## 2023-06-13 NOTE — PROGRESS NOTES
"Susanna Carr is a 83 y.o. female is s/p       Chief Complaint   Patient presents with    Left Hip - Post-op    Suture / Staple Removal       HISTORY OF PRESENT ILLNESS: Ms. Carr is a 83-year-old female patient who presents today with her daughter and great granddaughter.  The patient is here for a 2-week follow-up status post left hip intertrochanteric nailing surgical procedure.  This procedure was performed by Dr. Monae on 5/30/2023 after the patient was brought to the ER and found to have a left intertrochanteric hip fracture.  This injury was a result of a fall.  The patient reports she was helping give her granddaughter a bath and after the bath, she was bringing her shower chair back to the bathroom and she fell landing on her left side.  The patient reported immediate pain and was unable to stand.  The patient was transferred to the emergency room where she was diagnosed with a hip fracture.  At today's visit, the patient has no unusual complaints and is progressing well.  The patient is here for a new x-rays, to have her staples removed and Steri-Strips applied.  The patient reports she is still taking the Eliquis for DVT prophylaxis.  The patient is requesting a refill for her hydrocodone.  Patient states she is still participating in physical therapy at home with generalized strengthening and conditioning exercises as well as gait training.  Patient reports a little numbness to the lateral aspect of her upper leg.  She also reports decreased strength and ability to flex her hip.  Denies fever and chills.  Sent for new left hip and pelvis x-ray upon arrival.    05/30/23 (2w) Dominick Monae MD   Left Hip Intertrochanteric Nailing - Left       Allergies   Allergen Reactions    Alendronate Unknown (See Comments)     \"gives me chest pain\"    Niacin And Related Hives    Other Hives     Allergic to \"all mycins\"    Latex Other (See Comments)     irritate skin         Current Outpatient Medications: "     apixaban (ELIQUIS) 2.5 MG tablet tablet, Take 1 tablet by mouth Every 12 (Twelve) Hours for 58 doses., Disp: 58 tablet, Rfl: 0    aspirin 81 MG chewable tablet, Chew 1 tablet Daily., Disp: , Rfl:     atenolol (TENORMIN) 25 MG tablet, Take 12.5 mg by mouth Daily. Take one-half tablet once daily, Disp: , Rfl:     Calcium 500-100 MG-UNIT chewable tablet, Chew 1 tablet 2 (Two) Times a Day. Plus d, Disp: , Rfl:     ferrous sulfate 324 (65 Fe) MG tablet delayed-release EC tablet, Take 1 tablet by mouth Daily With Breakfast., Disp: , Rfl:     glipiZIDE (GLUCOTROL) 5 MG tablet, Take 1 tablet by mouth 2 (Two) Times a Day Before Meals., Disp: , Rfl:     hydrochlorothiazide (HYDRODIURIL) 12.5 MG tablet, Take 1 tablet by mouth Daily., Disp: , Rfl:     HYDROcodone-acetaminophen (NORCO) 5-325 MG per tablet, Take 1 tablet by mouth Every 8 (Eight) Hours As Needed for Moderate Pain., Disp: 30 tablet, Rfl: 0    lisinopril (PRINIVIL,ZESTRIL) 2.5 MG tablet, Take 1 tablet by mouth Daily., Disp: , Rfl:     metFORMIN (GLUCOPHAGE) 1000 MG tablet, Take 1 tablet by mouth 2 (Two) Times a Day With Meals., Disp: , Rfl:     omeprazole (priLOSEC) 20 MG capsule, Take 1 capsule by mouth Daily., Disp: , Rfl:     sennosides-docusate sodium (SENOKOT-S) 8.6-50 MG tablet, Take 1 tablet by mouth Daily As Needed for Constipation., Disp: 30 tablet, Rfl: 0    simvastatin (ZOCOR) 40 MG tablet, Take 1 tablet by mouth Every Night., Disp: , Rfl:     vitamin B-12 (CYANOCOBALAMIN) 100 MCG tablet, Take 50 mcg by mouth Daily., Disp: , Rfl:     No fevers or chills.  No nausea or vomiting.      PHYSICAL EXAMINATION:       Susanna Carr is a 83 y.o. female    Patient is awake and alert, answers questions appropriately and is in no apparent distress.    GAIT:     []  Normal  []  Antalgic    Assistive device: []  None  []  Walker     []  Crutches  []  Cane     []  Wheelchair  []  Stretcher    Left Hip Exam     Range of Motion   Abduction:  abnormal   Adduction:   abnormal   Extension:  abnormal   Flexion:  abnormal   External rotation:  abnormal   Internal rotation: abnormal     Muscle Strength   Flexion: 1/5     Tests   ALBERTO: negative  Fadir:  Negative FADIR test    Other   Erythema: absent  Scars: present  Sensation: normal  Pulse: present    Comments:  Surgical incisions healing as expected.  Surrounding skin warm, dry and intact.  No signs of infection.  Patient has significant weakness with flexion.  She is able to extend her knee but not against resistance.  Calf nontender with a negative Homans' sign.  Distal pulse palpable.              XR Hip With or Without Pelvis 2 - 3 View Left    Result Date: 6/13/2023  Narrative: FINDINGS: Patient is post internal fixation of an intertrochanteric fracture of the left femur.  There is a continued displaced lesser trochanter. No other significant finding.    XR Hip With or Without Pelvis 2 - 3 View Left    Result Date: 5/29/2023  Narrative: HISTORY: Fall, hip pain. COMPARISON: None. FINDINGS: AP view of the pelvis and lateral view of the left hip demonstrate left intertrochanteric fracture.  There is angulation.  There is no dislocation.     Impression: Acute left intertrochanteric fracture.         ASSESSMENT:    Diagnoses and all orders for this visit:    Traumatic closed intertrochanteric fracture of left femur with minimal displacement, initial encounter  -     HYDROcodone-acetaminophen (NORCO) 5-325 MG per tablet; Take 1 tablet by mouth Every 8 (Eight) Hours As Needed for Moderate Pain.  -     DEXA Bone Density Axial; Future  -     Ambulatory Referral to Orthopedic Surgery    Status post hip surgery  -     HYDROcodone-acetaminophen (NORCO) 5-325 MG per tablet; Take 1 tablet by mouth Every 8 (Eight) Hours As Needed for Moderate Pain.  -     DEXA Bone Density Axial; Future  -     Ambulatory Referral to Orthopedic Surgery    Age-related osteoporosis with current pathological fracture, initial encounter  -     DEXA Bone Density  Axial; Future  -     Ambulatory Referral to Orthopedic Surgery    Encounter for follow-up examination after completed treatment for conditions other than malignant neoplasm  -     DEXA Bone Density Axial; Future  -     Ambulatory Referral to Orthopedic Surgery          PLAN  Sent the patient for an x-ray upon arrival.  Reviewed the x-ray of the left hip and this.  Discussed with the patient and her daughter.  Advised the surgical hardware is well-seated with no sign of periprosthetic fracture.  Anatomical alignment is acceptable.  No acute bony abnormalities noted.  Staples removed and Steri-Strips applied.  Surgical incision care and Steri-Strip care education provided.  Advised patient she may shower but no bathtub, hot tub, swimming pool or other body of water until her incisions have healed.  Recommended to continue using her walker for weightbearing activity.  Patient states that is the only way she can walk.  Denies pain at today's visit.  Recommended to continue physical therapy for generalized strengthening and range of motion exercises as well as gait training.  Advised the patient if she is discharged from home health PT to notify the office and I will order outpatient PT.  Reviewed signs of infection and DVT.  Patient and daughter verbalized understanding.  Recommended the patient to continue her Eliquis until completed.  After completed, start aspirin 81 mg twice daily x2 weeks.  After this regimen of aspirin is completed, she may return to her 81 mg aspirin daily.  Refill for hydrocodone sent to pharmacy.  Mauricio reviewed, appropriate.  Reminded the patient she should be cautious when taking this medication as it can cause dizziness and drowsiness increasing her risk for fall.  Viewed common side effects of the medication including constipation and addiction.  Patient verbalized understanding.  Briefly discussed bone health with this patient.  Advised the patient based on her fall, this is an  osteoporotic, fragility fracture.  Encouraged the patient to have a new bone density scan and to follow-up with the bone health clinic.  Patient is agreeable.  New order sent for a DEXA scan.  Advised patient she will follow-up with the bone health clinic to discuss results.  Patient verbalized understanding.    Recommend the patient to follow-up in 4 weeks for reevaluation.  May follow-up sooner as needed if symptoms change, worsen or for new complaint.    All questions and concerns are addressed with understanding noted. They are aware and are in agreement to this plan.    Return in about 4 weeks (around 7/11/2023) for Recheck.    DEANNA Welsh    This document has been electronically signed by DEANNA Welsh on June 13, 2023 17:32 CDT      EMR Dragon/Transciption Disclaimer: Some of this note may be an electronic transcription/translation of spoken language to printed text using the Dragon Dictation System.

## 2023-06-14 ENCOUNTER — HOME CARE VISIT (OUTPATIENT)
Dept: HOME HEALTH SERVICES | Facility: CLINIC | Age: 83
End: 2023-06-14
Payer: MEDICARE

## 2023-06-14 VITALS
TEMPERATURE: 98.4 F | OXYGEN SATURATION: 99 % | RESPIRATION RATE: 18 BRPM | HEART RATE: 110 BPM | SYSTOLIC BLOOD PRESSURE: 146 MMHG | DIASTOLIC BLOOD PRESSURE: 88 MMHG

## 2023-06-14 PROCEDURE — G0299 HHS/HOSPICE OF RN EA 15 MIN: HCPCS

## 2023-06-15 ENCOUNTER — HOME CARE VISIT (OUTPATIENT)
Dept: HOME HEALTH SERVICES | Facility: CLINIC | Age: 83
End: 2023-06-15
Payer: MEDICARE

## 2023-06-15 VITALS
TEMPERATURE: 97.9 F | HEART RATE: 64 BPM | RESPIRATION RATE: 18 BRPM | SYSTOLIC BLOOD PRESSURE: 126 MMHG | OXYGEN SATURATION: 97 % | DIASTOLIC BLOOD PRESSURE: 70 MMHG

## 2023-06-15 PROCEDURE — G0157 HHC PT ASSISTANT EA 15: HCPCS

## 2023-06-16 ENCOUNTER — HOME CARE VISIT (OUTPATIENT)
Dept: HOME HEALTH SERVICES | Facility: CLINIC | Age: 83
End: 2023-06-16
Payer: MEDICARE

## 2023-06-16 PROCEDURE — G0158 HHC OT ASSISTANT EA 15: HCPCS

## 2023-07-05 LAB
QT INTERVAL: 364 MS
QTC INTERVAL: 459 MS

## 2023-07-28 ENCOUNTER — HOME CARE VISIT (OUTPATIENT)
Dept: HOME HEALTH SERVICES | Facility: CLINIC | Age: 83
End: 2023-07-28
Payer: MEDICARE

## 2023-07-28 VITALS
TEMPERATURE: 98.2 F | DIASTOLIC BLOOD PRESSURE: 64 MMHG | OXYGEN SATURATION: 98 % | HEART RATE: 68 BPM | SYSTOLIC BLOOD PRESSURE: 138 MMHG | RESPIRATION RATE: 20 BRPM

## 2023-07-28 PROCEDURE — G0151 HHCP-SERV OF PT,EA 15 MIN: HCPCS

## 2023-08-02 ENCOUNTER — OFFICE VISIT (OUTPATIENT)
Dept: ORTHOPEDIC SURGERY | Facility: CLINIC | Age: 83
End: 2023-08-02
Payer: MEDICARE

## 2023-08-02 VITALS — WEIGHT: 135.3 LBS | BODY MASS INDEX: 25.54 KG/M2 | HEIGHT: 61 IN

## 2023-08-02 DIAGNOSIS — M84.48XA: ICD-10-CM

## 2023-08-02 DIAGNOSIS — M80.00XG AGE-RELATED OSTEOPOROSIS WITH CURRENT PATHOLOGICAL FRACTURE WITH DELAYED HEALING, SUBSEQUENT ENCOUNTER: Primary | ICD-10-CM

## 2023-08-02 DIAGNOSIS — E11.9 CONTROLLED TYPE 2 DIABETES MELLITUS WITHOUT COMPLICATION, WITHOUT LONG-TERM CURRENT USE OF INSULIN: ICD-10-CM

## 2023-08-02 DIAGNOSIS — Z87.310 HISTORY OF PATHOLOGICAL FRACTURE DUE TO OSTEOPOROSIS: ICD-10-CM

## 2023-08-02 PROBLEM — Z13.820 SCREENING FOR OSTEOPOROSIS: Status: ACTIVE | Noted: 2023-08-02

## 2023-08-23 DIAGNOSIS — M25.552 LEFT HIP PAIN: Primary | ICD-10-CM

## 2023-08-29 ENCOUNTER — OFFICE VISIT (OUTPATIENT)
Dept: ORTHOPEDIC SURGERY | Facility: CLINIC | Age: 83
End: 2023-08-29
Payer: MEDICARE

## 2023-08-29 VITALS — HEIGHT: 61 IN | WEIGHT: 134.7 LBS | BODY MASS INDEX: 25.43 KG/M2

## 2023-08-29 DIAGNOSIS — E11.9 CONTROLLED TYPE 2 DIABETES MELLITUS WITHOUT COMPLICATION, WITHOUT LONG-TERM CURRENT USE OF INSULIN: ICD-10-CM

## 2023-08-29 DIAGNOSIS — S72.142D: ICD-10-CM

## 2023-08-29 DIAGNOSIS — Z87.310 HISTORY OF PATHOLOGICAL FRACTURE DUE TO OSTEOPOROSIS: ICD-10-CM

## 2023-08-29 DIAGNOSIS — Z98.890 STATUS POST HIP SURGERY: ICD-10-CM

## 2023-08-29 DIAGNOSIS — S72.002D CLOSED FRACTURE OF LEFT HIP WITH ROUTINE HEALING, SUBSEQUENT ENCOUNTER: Primary | ICD-10-CM

## 2023-08-29 PROCEDURE — 1160F RVW MEDS BY RX/DR IN RCRD: CPT | Performed by: ORTHOPAEDIC SURGERY

## 2023-08-29 PROCEDURE — 1159F MED LIST DOCD IN RCRD: CPT | Performed by: ORTHOPAEDIC SURGERY

## 2023-08-29 PROCEDURE — 99024 POSTOP FOLLOW-UP VISIT: CPT | Performed by: ORTHOPAEDIC SURGERY

## 2023-08-29 NOTE — PROGRESS NOTES
"Susanna Carr is a 83 y.o. female returns for     Chief Complaint   Patient presents with    Left Hip - Follow-up       HISTORY OF PRESENT ILLNESS: Patient is here today for follow up of left hip. She was sent to xray upon arrival.  Progressing with Walking.  Using a walker and sometimes a cane.  No fever or chills.  Doing HEP, finished PT    05/30/23 (2m 30d) Me   Left Hip Intertrochanteric Nailing - Left        CONCURRENT MEDICAL HISTORY:    The following portions of the patient's history were reviewed and updated as appropriate: allergies, current medications, past family history, past medical history, past social history, past surgical history and problem list.     ROS  No fevers or chills.  No chest pain or shortness of air.  No GI or  disturbances.    PHYSICAL EXAMINATION:       Ht 154.9 cm (61\")   Wt 61.1 kg (134 lb 11.2 oz)   BMI 25.45 kg/mý     Physical Exam  Constitutional:       General: She is not in acute distress.     Appearance: Normal appearance.   Pulmonary:      Effort: Pulmonary effort is normal. No respiratory distress.   Neurological:      Mental Status: She is alert and oriented to person, place, and time.       GAIT:     []  Normal  [x]  Antalgic    Assistive device: []  None  [x]  Walker     []  Crutches  []  Cane     []  Wheelchair  []  Stretcher    Left Hip Exam     Comments:  Good range of motion.  Good distal pulses and sensation.  Muscle tone and strength.            XR Hip With or Without Pelvis 2 - 3 View Left    Result Date: 8/29/2023  Narrative: Ordering Provider:  Dominick Monae MD Ordering Diagnosis/Indication:  Left hip pain Procedure:  XR HIP W OR WO PELVIS 2-3 VIEW LEFT Exam Date:  8/29/23 COMPARISON:  Todays X-rays were compared to previous images dated June 13, 2023.     Impression:  AP standing of the pelvis with AP and lateral of the left hip show acceptable position and alignment of a intertrochanteric hip fracture status post intramedullary rodding.  No " sign of implant loosening or failure is noted.  Progressive healing is noted on each view.  Significant vascular calcifications noted in both legs.  Mild to moderate degenerative changes noted in the visible portion of the lower lumbar spine.  No other acute findings. Dominick Monae MD 8/29/23           ASSESSMENT:    Diagnoses and all orders for this visit:    Closed fracture of left hip with routine healing, subsequent encounter    Controlled type 2 diabetes mellitus without complication, without long-term current use of insulin    History of pathological fracture due to osteoporosis    Closed traumatic minimally displaced intertrochanteric fracture of left femur with routine healing, subsequent encounter    Status post hip surgery          PLAN    Continue to slowly progress strength and conditioning as tolerated.  Continue activity as tolerated.  She will progress home exercises.  Continue use of a walker as needed for support.  Follow-up as needed.      Return if symptoms worsen or fail to improve, for recheck.    Dominick Monae MD

## (undated) DEVICE — PAD,ABDOMINAL,8"X10",ST,LF: Brand: MEDLINE

## (undated) DEVICE — STERILE POLYISOPRENE POWDER-FREE SURGICAL GLOVES: Brand: PROTEXIS

## (undated) DEVICE — GLV SURG SENSICARE PI ORTHO SZ6.5 LF STRL

## (undated) DEVICE — GOWN,AURORA,NOREINF,RAGLAN,XL,STERILE: Brand: MEDLINE

## (undated) DEVICE — SOL IRR H2O BTL 1000ML STRL

## (undated) DEVICE — DRILL, AO, STERILE

## (undated) DEVICE — STERILE POLYISOPRENE POWDER-FREE SURGICAL GLOVES WITH EMOLLIENT COATING: Brand: PROTEXIS

## (undated) DEVICE — GLV SURG SENSICARE MICRO PF LF 7.5 STRL

## (undated) DEVICE — GLV SURG SENSICARE PI PF LF 7 GRN STRL

## (undated) DEVICE — PROXIMATE SKIN STAPLERS (35 WIDE) CONTAINS 35 STAINLESS STEEL STAPLES (FIXED HEAD): Brand: PROXIMATE

## (undated) DEVICE — DRSNG GZ CURAD XEROFORM NONADHS 5X9IN STRL

## (undated) DEVICE — Device

## (undated) DEVICE — 3M™ IOBAN™ 2 ANTIMICROBIAL INCISE DRAPE 6651EZ: Brand: IOBAN™ 2

## (undated) DEVICE — GLV SURG SENSICARE POLYISPRN W/ALOE PF LF 6.5 GRN STRL

## (undated) DEVICE — K-WIRE
Type: IMPLANTABLE DEVICE | Site: HIP | Status: NON-FUNCTIONAL
Removed: 2023-05-30

## (undated) DEVICE — SOL IRR NACL 0.9PCT BT 1000ML

## (undated) DEVICE — PK HIP LF 60

## (undated) DEVICE — TP ELAS ELASTIKON ADHS 4IN 2.5YD

## (undated) DEVICE — SPNG GZ WOVN 4X4IN 12PLY 10/BX STRL

## (undated) DEVICE — SUT VIC 0 CT 36IN J958H

## (undated) DEVICE — APPL CHLORAPREP HI/LITE 26ML ORNG